# Patient Record
Sex: MALE | Race: WHITE | NOT HISPANIC OR LATINO | URBAN - METROPOLITAN AREA
[De-identification: names, ages, dates, MRNs, and addresses within clinical notes are randomized per-mention and may not be internally consistent; named-entity substitution may affect disease eponyms.]

---

## 2024-04-25 ENCOUNTER — EMERGENCY (EMERGENCY)
Facility: HOSPITAL | Age: 74
LOS: 1 days | Discharge: HOME | End: 2024-04-25
Attending: EMERGENCY MEDICINE | Admitting: EMERGENCY MEDICINE
Payer: MEDICARE

## 2024-04-25 VITALS
HEART RATE: 73 BPM | DIASTOLIC BLOOD PRESSURE: 92 MMHG | OXYGEN SATURATION: 100 % | RESPIRATION RATE: 18 BRPM | TEMPERATURE: 98 F | SYSTOLIC BLOOD PRESSURE: 163 MMHG

## 2024-04-25 VITALS
SYSTOLIC BLOOD PRESSURE: 138 MMHG | TEMPERATURE: 98 F | RESPIRATION RATE: 16 BRPM | HEART RATE: 71 BPM | DIASTOLIC BLOOD PRESSURE: 74 MMHG | OXYGEN SATURATION: 100 %

## 2024-04-25 LAB
ALBUMIN SERPL ELPH-MCNC: 4.3 G/DL — SIGNIFICANT CHANGE UP (ref 3.3–5)
ALP SERPL-CCNC: 33 U/L — LOW (ref 40–120)
ALT FLD-CCNC: 10 U/L — SIGNIFICANT CHANGE UP (ref 4–41)
ANION GAP SERPL CALC-SCNC: 13 MMOL/L — SIGNIFICANT CHANGE UP (ref 7–14)
APPEARANCE UR: ABNORMAL
AST SERPL-CCNC: 13 U/L — SIGNIFICANT CHANGE UP (ref 4–40)
BASOPHILS # BLD AUTO: 0.05 K/UL — SIGNIFICANT CHANGE UP (ref 0–0.2)
BASOPHILS NFR BLD AUTO: 0.6 % — SIGNIFICANT CHANGE UP (ref 0–2)
BILIRUB SERPL-MCNC: 0.4 MG/DL — SIGNIFICANT CHANGE UP (ref 0.2–1.2)
BILIRUB UR-MCNC: NEGATIVE — SIGNIFICANT CHANGE UP
BLOOD GAS VENOUS COMPREHENSIVE RESULT: SIGNIFICANT CHANGE UP
BLOOD GAS VENOUS COMPREHENSIVE RESULT: SIGNIFICANT CHANGE UP
BUN SERPL-MCNC: 37 MG/DL — HIGH (ref 7–23)
CALCIUM SERPL-MCNC: 10.1 MG/DL — SIGNIFICANT CHANGE UP (ref 8.4–10.5)
CHLORIDE SERPL-SCNC: 103 MMOL/L — SIGNIFICANT CHANGE UP (ref 98–107)
CO2 SERPL-SCNC: 20 MMOL/L — LOW (ref 22–31)
COLOR SPEC: YELLOW — SIGNIFICANT CHANGE UP
CREAT SERPL-MCNC: 1.29 MG/DL — SIGNIFICANT CHANGE UP (ref 0.5–1.3)
DIFF PNL FLD: ABNORMAL
EGFR: 59 ML/MIN/1.73M2 — LOW
EOSINOPHIL # BLD AUTO: 0.11 K/UL — SIGNIFICANT CHANGE UP (ref 0–0.5)
EOSINOPHIL NFR BLD AUTO: 1.3 % — SIGNIFICANT CHANGE UP (ref 0–6)
GLUCOSE SERPL-MCNC: 162 MG/DL — HIGH (ref 70–99)
GLUCOSE UR QL: NEGATIVE MG/DL — SIGNIFICANT CHANGE UP
HCT VFR BLD CALC: 40.4 % — SIGNIFICANT CHANGE UP (ref 39–50)
HGB BLD-MCNC: 14.1 G/DL — SIGNIFICANT CHANGE UP (ref 13–17)
IANC: 5.84 K/UL — SIGNIFICANT CHANGE UP (ref 1.8–7.4)
IMM GRANULOCYTES NFR BLD AUTO: 0.5 % — SIGNIFICANT CHANGE UP (ref 0–0.9)
KETONES UR-MCNC: NEGATIVE MG/DL — SIGNIFICANT CHANGE UP
LEUKOCYTE ESTERASE UR-ACNC: ABNORMAL
LYMPHOCYTES # BLD AUTO: 1.77 K/UL — SIGNIFICANT CHANGE UP (ref 1–3.3)
LYMPHOCYTES # BLD AUTO: 20.8 % — SIGNIFICANT CHANGE UP (ref 13–44)
MCHC RBC-ENTMCNC: 31.4 PG — SIGNIFICANT CHANGE UP (ref 27–34)
MCHC RBC-ENTMCNC: 34.9 GM/DL — SIGNIFICANT CHANGE UP (ref 32–36)
MCV RBC AUTO: 90 FL — SIGNIFICANT CHANGE UP (ref 80–100)
MONOCYTES # BLD AUTO: 0.68 K/UL — SIGNIFICANT CHANGE UP (ref 0–0.9)
MONOCYTES NFR BLD AUTO: 8 % — SIGNIFICANT CHANGE UP (ref 2–14)
NEUTROPHILS # BLD AUTO: 5.84 K/UL — SIGNIFICANT CHANGE UP (ref 1.8–7.4)
NEUTROPHILS NFR BLD AUTO: 68.8 % — SIGNIFICANT CHANGE UP (ref 43–77)
NITRITE UR-MCNC: POSITIVE
NRBC # BLD: 0 /100 WBCS — SIGNIFICANT CHANGE UP (ref 0–0)
NRBC # FLD: 0 K/UL — SIGNIFICANT CHANGE UP (ref 0–0)
PH UR: 8.5 (ref 5–8)
PLATELET # BLD AUTO: 230 K/UL — SIGNIFICANT CHANGE UP (ref 150–400)
POTASSIUM SERPL-MCNC: 4 MMOL/L — SIGNIFICANT CHANGE UP (ref 3.5–5.3)
POTASSIUM SERPL-SCNC: 4 MMOL/L — SIGNIFICANT CHANGE UP (ref 3.5–5.3)
PROT SERPL-MCNC: 7 G/DL — SIGNIFICANT CHANGE UP (ref 6–8.3)
PROT UR-MCNC: 100 MG/DL
RBC # BLD: 4.49 M/UL — SIGNIFICANT CHANGE UP (ref 4.2–5.8)
RBC # FLD: 13.4 % — SIGNIFICANT CHANGE UP (ref 10.3–14.5)
SODIUM SERPL-SCNC: 136 MMOL/L — SIGNIFICANT CHANGE UP (ref 135–145)
SP GR SPEC: 1.02 — SIGNIFICANT CHANGE UP (ref 1–1.03)
UROBILINOGEN FLD QL: 0.2 MG/DL — SIGNIFICANT CHANGE UP (ref 0.2–1)
WBC # BLD: 8.49 K/UL — SIGNIFICANT CHANGE UP (ref 3.8–10.5)
WBC # FLD AUTO: 8.49 K/UL — SIGNIFICANT CHANGE UP (ref 3.8–10.5)

## 2024-04-25 PROCEDURE — 74176 CT ABD & PELVIS W/O CONTRAST: CPT | Mod: 26,MC

## 2024-04-25 PROCEDURE — 99285 EMERGENCY DEPT VISIT HI MDM: CPT | Mod: FS

## 2024-04-25 RX ORDER — CEFDINIR 250 MG/5ML
1 POWDER, FOR SUSPENSION ORAL
Qty: 14 | Refills: 0
Start: 2024-04-25 | End: 2024-05-01

## 2024-04-25 RX ORDER — SODIUM CHLORIDE 9 MG/ML
500 INJECTION INTRAMUSCULAR; INTRAVENOUS; SUBCUTANEOUS ONCE
Refills: 0 | Status: COMPLETED | OUTPATIENT
Start: 2024-04-25 | End: 2024-04-25

## 2024-04-25 RX ORDER — CEFDINIR 250 MG/5ML
1 POWDER, FOR SUSPENSION ORAL
Qty: 20 | Refills: 0
Start: 2024-04-25 | End: 2024-05-04

## 2024-04-25 RX ORDER — PIPERACILLIN AND TAZOBACTAM 4; .5 G/20ML; G/20ML
3.38 INJECTION, POWDER, LYOPHILIZED, FOR SOLUTION INTRAVENOUS ONCE
Refills: 0 | Status: COMPLETED | OUTPATIENT
Start: 2024-04-25 | End: 2024-04-25

## 2024-04-25 RX ADMIN — PIPERACILLIN AND TAZOBACTAM 200 GRAM(S): 4; .5 INJECTION, POWDER, LYOPHILIZED, FOR SOLUTION INTRAVENOUS at 19:54

## 2024-04-25 RX ADMIN — SODIUM CHLORIDE 500 MILLILITER(S): 9 INJECTION INTRAMUSCULAR; INTRAVENOUS; SUBCUTANEOUS at 13:36

## 2024-04-25 RX ADMIN — SODIUM CHLORIDE 500 MILLILITER(S): 9 INJECTION INTRAMUSCULAR; INTRAVENOUS; SUBCUTANEOUS at 15:06

## 2024-04-25 NOTE — ED PROVIDER NOTE - NS ED ATTENDING STATEMENT MOD
I have seen and examined this patient and fully participated in the care of this patient as the teaching attending.  The service was shared with the MARLENA.  I reviewed and verified the documentation.

## 2024-04-25 NOTE — ED PROVIDER NOTE - PATIENT PORTAL LINK FT
You can access the FollowMyHealth Patient Portal offered by [facility] by registering at the following website: [email or web address]. By joining [employer]’s FollowMyHealth portal, you will also be able to view your health information using other applications (apps) compatible with our system.

## 2024-04-25 NOTE — ED ADULT NURSE NOTE - NSFALLUNIVINTERV_ED_ALL_ED
Bed/Stretcher in lowest position, wheels locked, appropriate side rails in place/Call bell, personal items and telephone in reach/Instruct patient to call for assistance before getting out of bed/chair/stretcher/Non-slip footwear applied when patient is off stretcher/[place] to call system/Physically safe environment - no spills, clutter or unnecessary equipment/Purposeful proactive rounding/Room/bathroom lighting operational, light cord in reach

## 2024-04-25 NOTE — ED ADULT NURSE NOTE - OBJECTIVE STATEMENT
Break RN: Pt received to room 20. Pt A&O x 4, ambulatory. Pt c/o UTI, dysuria and stones x 2days. + CVA tenderness. Pt has a chronic hernandez. 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 Hx of prostatitis. Pt denies fevers, chills, headache, vision changes, dizziness, chest pain, SOB, numbness or tingling, abdominal pain, N/V/D. PERRLA observed. No neuro deficits. Respirations even and unlabored. Abdomen soft, tender in left quadrant, distended. +2 pulses in all extremities. Comfort measures provided. Safety maintained. Pending MD orders. Break RN: Pt received to room 20. Pt A&O x 4, ambulatory. Pt c/o UTI, dysuria and stones x 2days. + CVA tenderness. Pt has a chronic hernandez. 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 Hx of prostatitis, DM2(noninsulin dependent). Pt denies fevers, chills, headache, vision changes, dizziness, chest pain, SOB, numbness or tingling, abdominal pain, N/V/D. PERRLA observed. No neuro deficits. Respirations even and unlabored. Abdomen soft, tender in left quadrant, distended. +2 pulses in all extremities. Comfort measures provided. Safety maintained. Pending MD orders.

## 2024-04-25 NOTE — ED PROVIDER NOTE - CLINICAL SUMMARY MEDICAL DECISION MAKING FREE TEXT BOX
73-year-old male with past medical history of spinal stenosis complicated by neurogenic bladder and chronic Salazar, diabetes type 2 on insulin, here complaining of stones in his Salazar catheter.  Patient states that he has been "battling chronic urine infections for the last 3 years".  Lives in the [place] during the winter months and has a urologist and "infectologist" that manage him there.  Patient is concerned that he is passing bladder stones which could be a result of Proteus in the urine.  Was just treated for a UTI with amikacin and fosfomycin 3 weeks ago in the [place].  Patient also states he thinks he has prostatitis, which she has self diagnosed.  States his urologist in the ER is not managing him anymore because he took cephalexin without the doctors approval.  Patient states he does not believe in colonization of the bladder and wants a cure for his infections.  Denies fevers or chills.  Tolerating p.o.  Physical exam unremarkable.  No abdominal tenderness.  Plan to obtain CT abdomen to assess for bladder stones, basic labs, treat UA accordingly.  Salazar catheter was exchanged 2 days ago, is draining, no need for replacement at this time.

## 2024-04-25 NOTE — ED PROVIDER NOTE - PHYSICAL EXAMINATION
Attending/Sally: Well-appearing, NAD; PERRL/EOMI, non-icterus, supple, no HENRIETTA, no JVD, RRR, CTAB; Abd-soft, NT/ND, no HSM; no LE edema, A&Ox3, nonfocal; Skin-warm/dry  Salazar catheter bag-light yellow, ~150 mL

## 2024-04-25 NOTE — ED ADULT TRIAGE NOTE - NSSEPSISSUSPECTED_ED_A_ED
Last ov  4/14//2020  No future ov scheduled    Last refill  4/13/20  #60, takes one tablet po 2 times daily.    pdmp checked:  NO alerts or Warnings  However, it states last dispensed was  3/17/20  
No

## 2024-04-25 NOTE — ED PROVIDER NOTE - PROGRESS NOTE DETAILS
Alonso Rosas MD, PGY2  Lab work nonactionable.  Urinalysis showing grossly positive, likely UTI.  CT showing mild right hydro and perinephric fat stranding concerning for possible pyelonephritis.  Patient given dose of Zosyn in the emergency department.  Will send cefdinir to pharmacy for patient.  Patient will follow-up with his urologist.  Will DC home.  Gave strict return precautions.  Answered all questions. Alonso Rosas MD, PGY2  Lab work nonactionable.  Urinalysis showing grossly positive, likely UTI.  CT showing mild right hydro and perinephric fat stranding concerning for possible pyelonephritis.  Patient given dose of Zosyn in the emergency department.  Will send cefdinir to pharmacy for patient.  Patient will follow-up with urologist.  Will DC home.  Gave strict return precautions.  Answered all questions.

## 2024-04-25 NOTE — ED PROVIDER NOTE - OBJECTIVE STATEMENT
73-year-old male with past medical history of spinal stenosis complicated by neurogenic bladder and chronic Salazar, diabetes type 2 on insulin, here complaining of stones in his Salazar catheter.  Patient states that he has been "battling chronic urine infections for the last 3 years".  [place] [facility] [place] the [place] and has a urologist and "infectologist" that manage him there.  Patient is concerned that he is passing bladder stones which could be a result of Proteus in the urine.  Was just treated for a UTI with amikacin and fosfomycin 3 weeks ago in the [facility] [place].  Patient also states he thinks he has prostatitis, which she has self diagnosed.  States his urologist in the ER is not managing him anymore because he took cephalexin without the doctors approval.  Patient states he does not believe in colonization of the bladder and wants a cure for his infections.  Denies fevers or chills.  Tolerating p.o. 73-year-old male with past medical history of spinal stenosis complicated by neurogenic bladder and chronic Salazar, diabetes type 2 on insulin, here complaining of stones in his Salazar catheter.  Patient states that he has been "battling chronic urine infections for the last 3 years".  L[place] in the [place] during the winter months and has a urologist and "infectologist" that manage him there.  Patient is concerned that he is passing bladder stones which could be a result of Proteus in the urine.  Was just treated for a UTI with amikacin and fosfomycin 3 weeks ago in the [facility] [place].  Patient also states he thinks he has prostatitis, which she has self diagnosed.  States his urologist in the ER is not managing him anymore because he took cephalexin without the doctors approval.  Patient states he does not believe in colonization of the bladder and wants a cure for his infections.  Denies fevers or chills.  Tolerating p.o.    Attending/Sally: 74 yo M as described above. Patient denies acute symptoms. He reprots folwy catheter replaced two days ago.

## 2024-04-25 NOTE — ED ADULT TRIAGE NOTE - CHIEF COMPLAINT QUOTE
Patient c/o UTI x 2 days. Patient has chronic hernandez cath and noted stones in catheter and pain to hernandez site. PHX- Prostatitis, born with only right kidney, DM2 no insulin pump. . Breathing non-labored. Patient ambulatory.

## 2024-04-25 NOTE — ED ADULT NURSE REASSESSMENT NOTE - NS ED NURSE REASSESS COMMENT FT1
Received pt alert and oriented X4. All vitals assessed. Pt verbalizes all his needs. Pt seen by both PA & MD. Pt in room resting comfortably. Will continue to round on pt throughout shift. Safety maintained

## 2024-04-25 NOTE — ED PROVIDER NOTE - CARE PROVIDER_API CALL
Ritchie Acuña  Urology  [address], [address]  [place] [address]  Phone: [phone]  Fax: [phone]  Follow Up Time:

## 2024-04-25 NOTE — PROVIDER CONTACT NOTE (OTHER) - ASSESSMENT
SW spoke with the patient at bedside who stated someone will be picking him up and he no longer needs a ride. Medical team alerted of update.   No further SW intervention needed for this visit.

## 2024-04-27 ENCOUNTER — EMERGENCY (EMERGENCY)
Facility: HOSPITAL | Age: 74
LOS: 1 days | Discharge: HOME | End: 2024-04-27
Attending: EMERGENCY MEDICINE | Admitting: EMERGENCY MEDICINE
Payer: MEDICARE

## 2024-04-27 VITALS
OXYGEN SATURATION: 97 % | SYSTOLIC BLOOD PRESSURE: 153 MMHG | DIASTOLIC BLOOD PRESSURE: 93 MMHG | TEMPERATURE: 98 F | HEART RATE: 77 BPM | RESPIRATION RATE: 18 BRPM

## 2024-04-27 VITALS
TEMPERATURE: 98 F | RESPIRATION RATE: 18 BRPM | HEART RATE: 74 BPM | OXYGEN SATURATION: 99 % | DIASTOLIC BLOOD PRESSURE: 75 MMHG | SYSTOLIC BLOOD PRESSURE: 141 MMHG

## 2024-04-27 PROBLEM — Z97.8 PRESENCE OF OTHER SPECIFIED DEVICES: Chronic | Status: ACTIVE | Noted: 2024-04-25

## 2024-04-27 PROBLEM — N31.9 NEUROMUSCULAR DYSFUNCTION OF BLADDER, UNSPECIFIED: Chronic | Status: ACTIVE | Noted: 2024-04-25

## 2024-04-27 PROBLEM — E11.9 TYPE 2 DIABETES MELLITUS WITHOUT COMPLICATIONS: Chronic | Status: ACTIVE | Noted: 2024-04-25

## 2024-04-27 LAB
-  COAGULASE NEGATIVE STAPHYLOCOCCUS: SIGNIFICANT CHANGE UP
ALBUMIN SERPL ELPH-MCNC: 4.1 G/DL — SIGNIFICANT CHANGE UP (ref 3.3–5)
ALP SERPL-CCNC: 38 U/L — LOW (ref 40–120)
ALT FLD-CCNC: 10 U/L — SIGNIFICANT CHANGE UP (ref 4–41)
ANION GAP SERPL CALC-SCNC: 11 MMOL/L — SIGNIFICANT CHANGE UP (ref 7–14)
APPEARANCE UR: ABNORMAL
AST SERPL-CCNC: 12 U/L — SIGNIFICANT CHANGE UP (ref 4–40)
BACTERIA # UR AUTO: ABNORMAL /HPF
BASOPHILS # BLD AUTO: 0.04 K/UL — SIGNIFICANT CHANGE UP (ref 0–0.2)
BASOPHILS NFR BLD AUTO: 0.7 % — SIGNIFICANT CHANGE UP (ref 0–2)
BILIRUB SERPL-MCNC: 0.3 MG/DL — SIGNIFICANT CHANGE UP (ref 0.2–1.2)
BILIRUB UR-MCNC: NEGATIVE — SIGNIFICANT CHANGE UP
BUN SERPL-MCNC: 31 MG/DL — HIGH (ref 7–23)
CALCIUM SERPL-MCNC: 9.4 MG/DL — SIGNIFICANT CHANGE UP (ref 8.4–10.5)
CAST: 4 /LPF — SIGNIFICANT CHANGE UP (ref 0–4)
CHLORIDE SERPL-SCNC: 106 MMOL/L — SIGNIFICANT CHANGE UP (ref 98–107)
CO2 SERPL-SCNC: 22 MMOL/L — SIGNIFICANT CHANGE UP (ref 22–31)
COLOR SPEC: YELLOW — SIGNIFICANT CHANGE UP
CREAT SERPL-MCNC: 1.11 MG/DL — SIGNIFICANT CHANGE UP (ref 0.5–1.3)
CULTURE RESULTS: ABNORMAL
CULTURE RESULTS: SIGNIFICANT CHANGE UP
DIFF PNL FLD: NEGATIVE — SIGNIFICANT CHANGE UP
EGFR: 71 ML/MIN/1.73M2 — SIGNIFICANT CHANGE UP
EOSINOPHIL # BLD AUTO: 0.11 K/UL — SIGNIFICANT CHANGE UP (ref 0–0.5)
EOSINOPHIL NFR BLD AUTO: 1.8 % — SIGNIFICANT CHANGE UP (ref 0–6)
GLUCOSE SERPL-MCNC: 160 MG/DL — HIGH (ref 70–99)
GLUCOSE UR QL: 250 MG/DL
GRAM STN FLD: ABNORMAL
HCT VFR BLD CALC: 40.5 % — SIGNIFICANT CHANGE UP (ref 39–50)
HGB BLD-MCNC: 13.9 G/DL — SIGNIFICANT CHANGE UP (ref 13–17)
IANC: 3.65 K/UL — SIGNIFICANT CHANGE UP (ref 1.8–7.4)
IMM GRANULOCYTES NFR BLD AUTO: 0.7 % — SIGNIFICANT CHANGE UP (ref 0–0.9)
KETONES UR-MCNC: NEGATIVE MG/DL — SIGNIFICANT CHANGE UP
LEUKOCYTE ESTERASE UR-ACNC: ABNORMAL
LYMPHOCYTES # BLD AUTO: 1.62 K/UL — SIGNIFICANT CHANGE UP (ref 1–3.3)
LYMPHOCYTES # BLD AUTO: 26.6 % — SIGNIFICANT CHANGE UP (ref 13–44)
MCHC RBC-ENTMCNC: 30.8 PG — SIGNIFICANT CHANGE UP (ref 27–34)
MCHC RBC-ENTMCNC: 34.3 GM/DL — SIGNIFICANT CHANGE UP (ref 32–36)
MCV RBC AUTO: 89.8 FL — SIGNIFICANT CHANGE UP (ref 80–100)
METHOD TYPE: SIGNIFICANT CHANGE UP
MONOCYTES # BLD AUTO: 0.62 K/UL — SIGNIFICANT CHANGE UP (ref 0–0.9)
MONOCYTES NFR BLD AUTO: 10.2 % — SIGNIFICANT CHANGE UP (ref 2–14)
NEUTROPHILS # BLD AUTO: 3.65 K/UL — SIGNIFICANT CHANGE UP (ref 1.8–7.4)
NEUTROPHILS NFR BLD AUTO: 60 % — SIGNIFICANT CHANGE UP (ref 43–77)
NITRITE UR-MCNC: POSITIVE
NRBC # BLD: 0 /100 WBCS — SIGNIFICANT CHANGE UP (ref 0–0)
NRBC # FLD: 0 K/UL — SIGNIFICANT CHANGE UP (ref 0–0)
ORGANISM # SPEC MICROSCOPIC CNT: ABNORMAL
ORGANISM # SPEC MICROSCOPIC CNT: ABNORMAL
PH UR: 8.5 (ref 5–8)
PLATELET # BLD AUTO: 201 K/UL — SIGNIFICANT CHANGE UP (ref 150–400)
POTASSIUM SERPL-MCNC: 4.2 MMOL/L — SIGNIFICANT CHANGE UP (ref 3.5–5.3)
POTASSIUM SERPL-SCNC: 4.2 MMOL/L — SIGNIFICANT CHANGE UP (ref 3.5–5.3)
PROT SERPL-MCNC: 6.9 G/DL — SIGNIFICANT CHANGE UP (ref 6–8.3)
PROT UR-MCNC: 30 MG/DL
RBC # BLD: 4.51 M/UL — SIGNIFICANT CHANGE UP (ref 4.2–5.8)
RBC # FLD: 13.2 % — SIGNIFICANT CHANGE UP (ref 10.3–14.5)
RBC CASTS # UR COMP ASSIST: 2 /HPF — SIGNIFICANT CHANGE UP (ref 0–4)
REVIEW: SIGNIFICANT CHANGE UP
SODIUM SERPL-SCNC: 139 MMOL/L — SIGNIFICANT CHANGE UP (ref 135–145)
SP GR SPEC: 1.02 — SIGNIFICANT CHANGE UP (ref 1–1.03)
SPECIMEN SOURCE: SIGNIFICANT CHANGE UP
SQUAMOUS # UR AUTO: 0 /HPF — SIGNIFICANT CHANGE UP (ref 0–5)
TRI-PHOS CRY UR QL COMP ASSIST: PRESENT
UROBILINOGEN FLD QL: 0.2 MG/DL — SIGNIFICANT CHANGE UP (ref 0.2–1)
WBC # BLD: 6.08 K/UL — SIGNIFICANT CHANGE UP (ref 3.8–10.5)
WBC # FLD AUTO: 6.08 K/UL — SIGNIFICANT CHANGE UP (ref 3.8–10.5)
WBC UR QL: 17 /HPF — HIGH (ref 0–5)

## 2024-04-27 PROCEDURE — 99284 EMERGENCY DEPT VISIT MOD MDM: CPT | Mod: GC

## 2024-04-27 RX ORDER — INSULIN ASPART 100 [IU]/ML
16 INJECTION, SOLUTION SUBCUTANEOUS
Qty: 1 | Refills: 0
Start: 2024-04-27 | End: 2024-05-26

## 2024-04-27 RX ORDER — SODIUM CHLORIDE 9 MG/ML
1000 INJECTION INTRAMUSCULAR; INTRAVENOUS; SUBCUTANEOUS ONCE
Refills: 0 | Status: COMPLETED | OUTPATIENT
Start: 2024-04-27 | End: 2024-04-27

## 2024-04-27 RX ORDER — INSULIN GLARGINE 100 [IU]/ML
40 INJECTION, SOLUTION SUBCUTANEOUS
Qty: 1 | Refills: 0
Start: 2024-04-27 | End: 2024-05-26

## 2024-04-27 RX ADMIN — SODIUM CHLORIDE 1000 MILLILITER(S): 9 INJECTION INTRAMUSCULAR; INTRAVENOUS; SUBCUTANEOUS at 13:46

## 2024-04-27 NOTE — ED ADULT NURSE NOTE - OBJECTIVE STATEMENT
pt reports to ED  awake and alert x 3 , ambulatory with steady gait to ER room 7. pt states " received phone call by primary md to follow up in er for abnormal lab work of positive blood cultures " pt denies any active pain or c/o . pt afebrile , denies any fever or chills or s/s of infection , pink , warm and dry to touch. pt orientated to room with understanding verbalized. No active c/o . pt also reports chronic Salazar intact with leg bag , urine clear ko, denies any pain or burning with urination . POC in progress, safety maintained at stretcher side with continued monitoring by RN .

## 2024-04-27 NOTE — ED PROVIDER NOTE - PROGRESS NOTE DETAILS
Namrata BUCK:Labs discussed with patient.  I discussed with him how he likely has a skin contaminant and his blood culture.  Patient states he feels well.  I discussed changing the Salazar with patient to get a clean culture.  Patient is on cefdinir at this time.  Based on his urine culture results that he has with him from an outside hospital, he previously had an infection with Proteus Mirabella's, resistant to cephalosporins.   Sensitivities show that  patient will likely require IV or ertapenem if he is still infected.  He states that amikacin and fosfomycin were given to him and it did not work.  Patient states that he prefers did not have his Salazar catheter changed.  He states that he has stones in his bladder and it was very traumatic to change it.  I printed a copy of the recent urine culture results which shows 3 contaminants.  Patient states he prefers to follow-up with his urologist, Dr. Acuña.  He states that he thinks he is likely colonized.  I discussed with him concern for worsening symptoms including fevers, chills, pain, dizziness.  Patient states he prefers to be discharged and follow-up.  He states that he has airline tickets and does not want to lose "hundreds of dollars." as patient has remained stable, without any fevers, chills and has no significant change in his blood work, plan for discharge.  Repeat blood cultures were drawn and sent.  Patient declined changing his Salazar and sending a fresh urine sample. MD Brianna (PGY-2) Patient requesting insulin refill. Patient providing home dosages. Will send prescription.

## 2024-04-27 NOTE — ED ADULT NURSE NOTE - NSFALLUNIVINTERV_ED_ALL_ED
Bed/Stretcher in lowest position, wheels locked, appropriate side rails in place/Call bell, personal items and telephone in reach/Instruct patient to call for assistance before getting out of bed/chair/stretcher/Non-slip footwear applied when patient is off stretcher/[place] to call system/Physically safe environment - no spills, clutter or unnecessary equipment/Purposeful proactive rounding/Room/bathroom lighting operational, light cord in reach

## 2024-04-27 NOTE — ED ADULT TRIAGE NOTE - CHIEF COMPLAINT QUOTE
c/o generalized weakness, repots was a call back for positive blood cultures, Phx of DM type 2, chronic Salazar with frequent UTIs

## 2024-04-27 NOTE — ED POST DISCHARGE NOTE - RESULT SUMMARY
with positive blood cx. patient contact but no answer. Voicemail was not available to leave message. information will be passed on the call back line to attempt to contact patient.

## 2024-04-27 NOTE — ED PROVIDER NOTE - CLINICAL SUMMARY MEDICAL DECISION MAKING FREE TEXT BOX
73-year-old male with past medical history of diabetes, spinal stenosis complicated by neurogenic bladder with chronic Salazar, presenting the ED after being called back for positive blood culture.  Hemodynamically stable.  Physical exam with heart regular rate and rhythm, lungs clear to auscultation, abdomen soft nondistended nontender.  Patient without any CVA tenderness.  Patient called back for positive culture with coag negative staph.  Given patient's vitals and history, likely contamination.  Will get repeat labs including CBC, CMP, blood cultures, UA/UC.  Will replace Salazar.

## 2024-04-27 NOTE — ED PROVIDER NOTE - NSFOLLOWUPINSTRUCTIONS_ED_ALL_ED_FT
You were seen in the Emergency Department for concern for blood cultures. Your vitals were stable when you came to the emergency department. You did show a history of having antibiotic resistant UTI, which means that the antibiotics you were sent home on may not cover your UTI. You were recommended to stay in the hospital for IV antibiotics, but you would prefer to follow up outpatient with your primary care doctor. Please call him as soon as possible for evaluation.    1) Advance activity as tolerated.   2) Continue all previously prescribed medications as directed.    3) Follow up with your primary care physician in 24-48 hours - take copies of your results.    4) Return to the Emergency Department for worsening or persistent symptoms, and/or ANY NEW OR CONCERNING SYMPTOMS. You were seen in the Emergency Department for concern for blood cultures. Your vitals were stable when you came to the emergency department. You did show a history of having antibiotic resistant UTI, which means that the antibiotics you were sent home on may not cover your UTI. You were recommended to stay in the hospital for IV antibiotics, but you would prefer to follow up outpatient with your primary care doctor. Please call him as soon as possible for evaluation.    1) Advance activity as tolerated.   2) Continue all previously prescribed medications as directed.    3) Follow up with your primary care physician in 24-48 hours - take copies of your results.    4) Return to the Emergency Department for worsening or persistent symptoms, fever, chills, nausea, vomiting, flank pain, and/or ANY NEW OR CONCERNING SYMPTOMS.

## 2024-04-27 NOTE — ED PROVIDER NOTE - OBJECTIVE STATEMENT
73-year-old male with past medical history of diabetes, spinal stenosis complicated by neurogenic bladder with chronic Salazar, presenting the ED after being called back for positive blood culture.  Patient was in the ED on April 25 for concerns of stones in his Salazar.  Patient's UA was grossly positive for UTI.  Urine culture came back with greater than 3 organisms probable collection contamination.  Patient blood culture with coagulase negative staph.  Patient states that he has not had any fevers, chills, nausea, vomiting, flank pain since coming leaving the hospital.  Patient was discharged on cefdinir.  Patient missed a.m. antibiotics due to not being able to get to the pharmacy.  Patient last took cefdinir last night.  Patient reports some abdominal pain with associated constipation.  Patient states he is passing gas.  Patient states that since having the spinal stenosis, he has been having consistent constipation.

## 2024-04-27 NOTE — ED PROVIDER NOTE - PATIENT PORTAL LINK FT
You can access the FollowMyHealth Patient Portal offered by [facility] by registering at the following website: [email or web address]. By joining [employer]’s FollowMyHealth portal, you will also be able to view your health information using other applications (apps) compatible with our system.

## 2024-04-27 NOTE — ED PROVIDER NOTE - ATTENDING CONTRIBUTION TO CARE
Patient is a 73-year-old male with a past medical history significant for type 2 diabetes mellitus, on insulin, chronic Salazar catheter secondary to neurogenic bladder (secondary to spinal stenosis), chronic intermittent urine infections who was seen in the emergency department 4/25 for concern of stones in his Salazar.  Patient had blood cultures and urine culture sent.  Urine culture resulted with greater than 3 organisms, likely contamination.  Patient had a positive blood culture in anaerobic bottle with coag negative staph (cocci).  Patient was discharged on cefdinir.  He states he did not  his prescription until yesterday and took a dose last night.  He did not take a dose yesterday morning.  He denies any fevers or chills.  He states he did not sleep well secondary to gas issues.  Patient states he did not take any medication this morning as he was called back to the emergency department.  He reports his Salazar catheter was changed about 3 weeks ago and it was traumatic at the time.  He has a picture with him that shows blood in the Salazar catheter.  During my assessment, his Salazar did not appear to have blood in it.  Patient states that he has noticed blood intermittently throughout the week but otherwise it has been flowing.        VS noted  Gen. no acute distress, Non toxic   HEENT: EOMI, mmm  Lungs: CTAB/L no C/ W /R   CVS: RRR   Abd; Soft non tender, non distended   Ext: no edema  Skin: no rash  Neuro AAOx3 non focal clear speech  a/p:  positive blood culture–patient has an anaerobic bottle with coag negative staph.  He denies any fevers, chills, nausea, vomiting. Patient is on antibiotics.  Will advise to continue antibiotics.  At this time, patient appears to be well.  Will check labs to check for any changes and redraw cultures.  If no significant change in blood work, will discuss discharge with patient with strict return precautions.  Patient's positive cultures likely a skin contaminant.  - Namrata BUCK

## 2024-05-01 LAB
-  AMPICILLIN: SIGNIFICANT CHANGE UP
-  CIPROFLOXACIN: SIGNIFICANT CHANGE UP
-  LEVOFLOXACIN: SIGNIFICANT CHANGE UP
-  NITROFURANTOIN: SIGNIFICANT CHANGE UP
-  TETRACYCLINE: SIGNIFICANT CHANGE UP
-  VANCOMYCIN: SIGNIFICANT CHANGE UP
CULTURE RESULTS: SIGNIFICANT CHANGE UP
METHOD TYPE: SIGNIFICANT CHANGE UP
SPECIMEN SOURCE: SIGNIFICANT CHANGE UP

## 2024-05-02 LAB
-  AMOXICILLIN/CLAVULANIC ACID: SIGNIFICANT CHANGE UP
-  AMPICILLIN/SULBACTAM: SIGNIFICANT CHANGE UP
-  AMPICILLIN: SIGNIFICANT CHANGE UP
-  AZTREONAM: SIGNIFICANT CHANGE UP
-  CEFAZOLIN: SIGNIFICANT CHANGE UP
-  CEFEPIME: SIGNIFICANT CHANGE UP
-  CEFOXITIN: SIGNIFICANT CHANGE UP
-  CEFTRIAXONE: SIGNIFICANT CHANGE UP
-  CEFUROXIME: SIGNIFICANT CHANGE UP
-  CIPROFLOXACIN: SIGNIFICANT CHANGE UP
-  ERTAPENEM: SIGNIFICANT CHANGE UP
-  GENTAMICIN: SIGNIFICANT CHANGE UP
-  LEVOFLOXACIN: SIGNIFICANT CHANGE UP
-  MEROPENEM: SIGNIFICANT CHANGE UP
-  NITROFURANTOIN: SIGNIFICANT CHANGE UP
-  PIPERACILLIN/TAZOBACTAM: SIGNIFICANT CHANGE UP
-  TOBRAMYCIN: SIGNIFICANT CHANGE UP
-  TRIMETHOPRIM/SULFAMETHOXAZOLE: SIGNIFICANT CHANGE UP
CULTURE RESULTS: ABNORMAL
CULTURE RESULTS: SIGNIFICANT CHANGE UP
CULTURE RESULTS: SIGNIFICANT CHANGE UP
METHOD TYPE: SIGNIFICANT CHANGE UP
ORGANISM # SPEC MICROSCOPIC CNT: ABNORMAL
SPECIMEN SOURCE: SIGNIFICANT CHANGE UP

## 2024-05-03 NOTE — ED POST DISCHARGE NOTE - DETAILS
Patient with ESBL in urine culture. Patient was to follow up with urologist Dr. Acuña. Patient sent home with cefdinir, which ESBL is resistant. Patient without answering .

## 2024-05-19 ENCOUNTER — INPATIENT (INPATIENT)
Facility: HOSPITAL | Age: 74
LOS: 15 days | Discharge: HOME | End: 2024-06-04
Attending: NEUROLOGICAL SURGERY | Admitting: NEUROLOGICAL SURGERY
Payer: MEDICARE

## 2024-05-19 VITALS
SYSTOLIC BLOOD PRESSURE: 160 MMHG | HEART RATE: 85 BPM | OXYGEN SATURATION: 99 % | DIASTOLIC BLOOD PRESSURE: 80 MMHG | TEMPERATURE: 98 F | RESPIRATION RATE: 18 BRPM

## 2024-05-19 DIAGNOSIS — Z29.9 ENCOUNTER FOR PROPHYLACTIC MEASURES, UNSPECIFIED: ICD-10-CM

## 2024-05-19 DIAGNOSIS — R29.898 OTHER SYMPTOMS AND SIGNS INVOLVING THE MUSCULOSKELETAL SYSTEM: ICD-10-CM

## 2024-05-19 DIAGNOSIS — R53.1 WEAKNESS: ICD-10-CM

## 2024-05-19 DIAGNOSIS — E11.9 TYPE 2 DIABETES MELLITUS WITHOUT COMPLICATIONS: ICD-10-CM

## 2024-05-19 LAB
ALBUMIN SERPL ELPH-MCNC: 4 G/DL — SIGNIFICANT CHANGE UP (ref 3.3–5)
ALP SERPL-CCNC: 37 U/L — LOW (ref 40–120)
ALT FLD-CCNC: 11 U/L — SIGNIFICANT CHANGE UP (ref 4–41)
ANION GAP SERPL CALC-SCNC: 13 MMOL/L — SIGNIFICANT CHANGE UP (ref 7–14)
APPEARANCE UR: CLEAR — SIGNIFICANT CHANGE UP
APTT BLD: 28.6 SEC — SIGNIFICANT CHANGE UP (ref 24.5–35.6)
AST SERPL-CCNC: 12 U/L — SIGNIFICANT CHANGE UP (ref 4–40)
BACTERIA # UR AUTO: ABNORMAL /HPF
BASE EXCESS BLDV CALC-SCNC: 0.1 MMOL/L — SIGNIFICANT CHANGE UP (ref -2–3)
BASOPHILS # BLD AUTO: 0.05 K/UL — SIGNIFICANT CHANGE UP (ref 0–0.2)
BASOPHILS NFR BLD AUTO: 0.7 % — SIGNIFICANT CHANGE UP (ref 0–2)
BILIRUB SERPL-MCNC: 0.4 MG/DL — SIGNIFICANT CHANGE UP (ref 0.2–1.2)
BILIRUB UR-MCNC: NEGATIVE — SIGNIFICANT CHANGE UP
BLD GP AB SCN SERPL QL: NEGATIVE — SIGNIFICANT CHANGE UP
BLOOD GAS VENOUS COMPREHENSIVE RESULT: SIGNIFICANT CHANGE UP
BUN SERPL-MCNC: 30 MG/DL — HIGH (ref 7–23)
CALCIUM SERPL-MCNC: 9.7 MG/DL — SIGNIFICANT CHANGE UP (ref 8.4–10.5)
CAST: 1 /LPF — SIGNIFICANT CHANGE UP (ref 0–4)
CHLORIDE BLDV-SCNC: 104 MMOL/L — SIGNIFICANT CHANGE UP (ref 96–108)
CHLORIDE SERPL-SCNC: 103 MMOL/L — SIGNIFICANT CHANGE UP (ref 98–107)
CO2 BLDV-SCNC: 25.9 MMOL/L — SIGNIFICANT CHANGE UP (ref 22–26)
CO2 SERPL-SCNC: 21 MMOL/L — LOW (ref 22–31)
COLOR SPEC: YELLOW — SIGNIFICANT CHANGE UP
CREAT SERPL-MCNC: 1.26 MG/DL — SIGNIFICANT CHANGE UP (ref 0.5–1.3)
DIFF PNL FLD: ABNORMAL
EGFR: 61 ML/MIN/1.73M2 — SIGNIFICANT CHANGE UP
EOSINOPHIL # BLD AUTO: 0.13 K/UL — SIGNIFICANT CHANGE UP (ref 0–0.5)
EOSINOPHIL NFR BLD AUTO: 1.9 % — SIGNIFICANT CHANGE UP (ref 0–6)
GAS PNL BLDV: 134 MMOL/L — LOW (ref 136–145)
GLUCOSE BLDV-MCNC: 163 MG/DL — HIGH (ref 70–99)
GLUCOSE SERPL-MCNC: 204 MG/DL — HIGH (ref 70–99)
GLUCOSE UR QL: NEGATIVE MG/DL — SIGNIFICANT CHANGE UP
HCO3 BLDV-SCNC: 25 MMOL/L — SIGNIFICANT CHANGE UP (ref 22–29)
HCT VFR BLD CALC: 39.5 % — SIGNIFICANT CHANGE UP (ref 39–50)
HCT VFR BLDA CALC: 43 % — SIGNIFICANT CHANGE UP (ref 39–51)
HGB BLD CALC-MCNC: 14.4 G/DL — SIGNIFICANT CHANGE UP (ref 12.6–17.4)
HGB BLD-MCNC: 13.9 G/DL — SIGNIFICANT CHANGE UP (ref 13–17)
IANC: 4.59 K/UL — SIGNIFICANT CHANGE UP (ref 1.8–7.4)
IMM GRANULOCYTES NFR BLD AUTO: 0.4 % — SIGNIFICANT CHANGE UP (ref 0–0.9)
INR BLD: 0.95 RATIO — SIGNIFICANT CHANGE UP (ref 0.85–1.18)
KETONES UR-MCNC: NEGATIVE MG/DL — SIGNIFICANT CHANGE UP
LACTATE BLDV-MCNC: 1.6 MMOL/L — SIGNIFICANT CHANGE UP (ref 0.5–2)
LEUKOCYTE ESTERASE UR-ACNC: ABNORMAL
LYMPHOCYTES # BLD AUTO: 1.43 K/UL — SIGNIFICANT CHANGE UP (ref 1–3.3)
LYMPHOCYTES # BLD AUTO: 20.7 % — SIGNIFICANT CHANGE UP (ref 13–44)
MAGNESIUM SERPL-MCNC: 2 MG/DL — SIGNIFICANT CHANGE UP (ref 1.6–2.6)
MCHC RBC-ENTMCNC: 31 PG — SIGNIFICANT CHANGE UP (ref 27–34)
MCHC RBC-ENTMCNC: 35.2 GM/DL — SIGNIFICANT CHANGE UP (ref 32–36)
MCV RBC AUTO: 88.2 FL — SIGNIFICANT CHANGE UP (ref 80–100)
MONOCYTES # BLD AUTO: 0.67 K/UL — SIGNIFICANT CHANGE UP (ref 0–0.9)
MONOCYTES NFR BLD AUTO: 9.7 % — SIGNIFICANT CHANGE UP (ref 2–14)
NEUTROPHILS # BLD AUTO: 4.59 K/UL — SIGNIFICANT CHANGE UP (ref 1.8–7.4)
NEUTROPHILS NFR BLD AUTO: 66.6 % — SIGNIFICANT CHANGE UP (ref 43–77)
NITRITE UR-MCNC: POSITIVE
NRBC # BLD: 0 /100 WBCS — SIGNIFICANT CHANGE UP (ref 0–0)
NRBC # FLD: 0 K/UL — SIGNIFICANT CHANGE UP (ref 0–0)
PCO2 BLDV: 39 MMHG — LOW (ref 42–55)
PH BLDV: 7.41 — SIGNIFICANT CHANGE UP (ref 7.32–7.43)
PH UR: 7 — SIGNIFICANT CHANGE UP (ref 5–8)
PHOSPHATE SERPL-MCNC: 3 MG/DL — SIGNIFICANT CHANGE UP (ref 2.5–4.5)
PLATELET # BLD AUTO: 204 K/UL — SIGNIFICANT CHANGE UP (ref 150–400)
PO2 BLDV: 52 MMHG — HIGH (ref 25–45)
POTASSIUM BLDV-SCNC: 4.3 MMOL/L — SIGNIFICANT CHANGE UP (ref 3.5–5.1)
POTASSIUM SERPL-MCNC: 4.3 MMOL/L — SIGNIFICANT CHANGE UP (ref 3.5–5.3)
POTASSIUM SERPL-SCNC: 4.3 MMOL/L — SIGNIFICANT CHANGE UP (ref 3.5–5.3)
PROT SERPL-MCNC: 6.9 G/DL — SIGNIFICANT CHANGE UP (ref 6–8.3)
PROT UR-MCNC: SIGNIFICANT CHANGE UP MG/DL
PROTHROM AB SERPL-ACNC: 10.7 SEC — SIGNIFICANT CHANGE UP (ref 9.5–13)
RBC # BLD: 4.48 M/UL — SIGNIFICANT CHANGE UP (ref 4.2–5.8)
RBC # FLD: 13.3 % — SIGNIFICANT CHANGE UP (ref 10.3–14.5)
RBC CASTS # UR COMP ASSIST: 1 /HPF — SIGNIFICANT CHANGE UP (ref 0–4)
RH IG SCN BLD-IMP: POSITIVE — SIGNIFICANT CHANGE UP
SAO2 % BLDV: 83.5 % — SIGNIFICANT CHANGE UP (ref 67–88)
SODIUM SERPL-SCNC: 137 MMOL/L — SIGNIFICANT CHANGE UP (ref 135–145)
SP GR SPEC: 1.01 — SIGNIFICANT CHANGE UP (ref 1–1.03)
SQUAMOUS # UR AUTO: 1 /HPF — SIGNIFICANT CHANGE UP (ref 0–5)
UROBILINOGEN FLD QL: 0.2 MG/DL — SIGNIFICANT CHANGE UP (ref 0.2–1)
WBC # BLD: 6.9 K/UL — SIGNIFICANT CHANGE UP (ref 3.8–10.5)
WBC # FLD AUTO: 6.9 K/UL — SIGNIFICANT CHANGE UP (ref 3.8–10.5)
WBC UR QL: 51 /HPF — HIGH (ref 0–5)

## 2024-05-19 PROCEDURE — 99285 EMERGENCY DEPT VISIT HI MDM: CPT

## 2024-05-19 PROCEDURE — 72131 CT LUMBAR SPINE W/O DYE: CPT | Mod: 26,MC

## 2024-05-19 PROCEDURE — 72125 CT NECK SPINE W/O DYE: CPT | Mod: 26,MC

## 2024-05-19 PROCEDURE — 72128 CT CHEST SPINE W/O DYE: CPT | Mod: 26,MC

## 2024-05-19 RX ORDER — INSULIN GLARGINE 100 [IU]/ML
20 INJECTION, SOLUTION SUBCUTANEOUS AT BEDTIME
Refills: 0 | Status: DISCONTINUED | OUTPATIENT
Start: 2024-05-19 | End: 2024-05-20

## 2024-05-19 RX ORDER — LANOLIN ALCOHOL/MO/W.PET/CERES
3 CREAM (GRAM) TOPICAL AT BEDTIME
Refills: 0 | Status: DISCONTINUED | OUTPATIENT
Start: 2024-05-19 | End: 2024-06-04

## 2024-05-19 RX ORDER — DEXTROSE 50 % IN WATER 50 %
12.5 SYRINGE (ML) INTRAVENOUS ONCE
Refills: 0 | Status: DISCONTINUED | OUTPATIENT
Start: 2024-05-19 | End: 2024-06-04

## 2024-05-19 RX ORDER — PIPERACILLIN AND TAZOBACTAM 4; .5 G/20ML; G/20ML
3.38 INJECTION, POWDER, LYOPHILIZED, FOR SOLUTION INTRAVENOUS ONCE
Refills: 0 | Status: COMPLETED | OUTPATIENT
Start: 2024-05-19 | End: 2024-05-19

## 2024-05-19 RX ORDER — LORATADINE 10 MG/1
1 TABLET ORAL
Refills: 0 | DISCHARGE

## 2024-05-19 RX ORDER — ACETAMINOPHEN 500 MG
650 TABLET ORAL EVERY 6 HOURS
Refills: 0 | Status: DISCONTINUED | OUTPATIENT
Start: 2024-05-19 | End: 2024-05-30

## 2024-05-19 RX ORDER — ACETAMINOPHEN 500 MG
975 TABLET ORAL ONCE
Refills: 0 | Status: COMPLETED | OUTPATIENT
Start: 2024-05-19 | End: 2024-05-19

## 2024-05-19 RX ORDER — DEXTROSE 10 % IN WATER 10 %
125 INTRAVENOUS SOLUTION INTRAVENOUS ONCE
Refills: 0 | Status: DISCONTINUED | OUTPATIENT
Start: 2024-05-19 | End: 2024-06-04

## 2024-05-19 RX ORDER — DEXTROSE 50 % IN WATER 50 %
25 SYRINGE (ML) INTRAVENOUS ONCE
Refills: 0 | Status: DISCONTINUED | OUTPATIENT
Start: 2024-05-19 | End: 2024-06-04

## 2024-05-19 RX ORDER — ASPIRIN/CALCIUM CARB/MAGNESIUM 324 MG
81 TABLET ORAL DAILY
Refills: 0 | Status: DISCONTINUED | OUTPATIENT
Start: 2024-05-19 | End: 2024-05-23

## 2024-05-19 RX ORDER — INSULIN LISPRO 100/ML
VIAL (ML) SUBCUTANEOUS
Refills: 0 | Status: DISCONTINUED | OUTPATIENT
Start: 2024-05-19 | End: 2024-05-29

## 2024-05-19 RX ORDER — ERTAPENEM SODIUM 1 G/1
1000 INJECTION, POWDER, LYOPHILIZED, FOR SOLUTION INTRAMUSCULAR; INTRAVENOUS EVERY 24 HOURS
Refills: 0 | Status: DISCONTINUED | OUTPATIENT
Start: 2024-05-19 | End: 2024-05-20

## 2024-05-19 RX ORDER — INSULIN LISPRO 100/ML
VIAL (ML) SUBCUTANEOUS AT BEDTIME
Refills: 0 | Status: DISCONTINUED | OUTPATIENT
Start: 2024-05-19 | End: 2024-05-29

## 2024-05-19 RX ORDER — SODIUM CHLORIDE 9 MG/ML
500 INJECTION INTRAMUSCULAR; INTRAVENOUS; SUBCUTANEOUS ONCE
Refills: 0 | Status: COMPLETED | OUTPATIENT
Start: 2024-05-19 | End: 2024-05-19

## 2024-05-19 RX ORDER — SODIUM CHLORIDE 9 MG/ML
1000 INJECTION, SOLUTION INTRAVENOUS
Refills: 0 | Status: DISCONTINUED | OUTPATIENT
Start: 2024-05-19 | End: 2024-06-04

## 2024-05-19 RX ORDER — INSULIN LISPRO 100/ML
10 VIAL (ML) SUBCUTANEOUS
Refills: 0 | Status: DISCONTINUED | OUTPATIENT
Start: 2024-05-19 | End: 2024-05-22

## 2024-05-19 RX ORDER — METOPROLOL TARTRATE 50 MG
25 TABLET ORAL DAILY
Refills: 0 | Status: DISCONTINUED | OUTPATIENT
Start: 2024-05-19 | End: 2024-05-25

## 2024-05-19 RX ORDER — GLUCAGON INJECTION, SOLUTION 0.5 MG/.1ML
1 INJECTION, SOLUTION SUBCUTANEOUS ONCE
Refills: 0 | Status: DISCONTINUED | OUTPATIENT
Start: 2024-05-19 | End: 2024-06-04

## 2024-05-19 RX ORDER — DEXTROSE 50 % IN WATER 50 %
15 SYRINGE (ML) INTRAVENOUS ONCE
Refills: 0 | Status: DISCONTINUED | OUTPATIENT
Start: 2024-05-19 | End: 2024-06-04

## 2024-05-19 RX ORDER — LIDOCAINE 4 G/100G
1 CREAM TOPICAL ONCE
Refills: 0 | Status: COMPLETED | OUTPATIENT
Start: 2024-05-19 | End: 2024-05-19

## 2024-05-19 RX ADMIN — SODIUM CHLORIDE 500 MILLILITER(S): 9 INJECTION INTRAMUSCULAR; INTRAVENOUS; SUBCUTANEOUS at 13:06

## 2024-05-19 RX ADMIN — PIPERACILLIN AND TAZOBACTAM 200 GRAM(S): 4; .5 INJECTION, POWDER, LYOPHILIZED, FOR SOLUTION INTRAVENOUS at 13:07

## 2024-05-19 RX ADMIN — Medication 1: at 22:39

## 2024-05-19 NOTE — H&P ADULT - PROBLEM SELECTOR PLAN 1
-Heavy metal testing, HIV, TSH, RPR, Lyme, B1, B6, B12 -Heavy metal testing, HIV, TSH, RPR, Lyme, B1, B6, B12, CK  -MRI evaluation for acute cord -Heavy metal testing, HIV, TSH, RPR, Lyme, B1, B6, B12, CK  -MRI evaluation for cord compression -Heavy metal testing, HIV, TSH, RPR, , B1, B6, B12, CK  -MRI evaluation for cord compression LE weakness with new pain and anterolateral numbness L>R  Heavy metal testing, HIV, TSH, RPR, , B1, B6, B12, CK  -MRI evaluation for cord compression given worsening LE weakness with new anterolateral b/l numbness.  -rectal tone intact on ED exam, patient denies fecal incontinence or saddle anesthesia  fall precautions  PT consult  lidocaine and acetaminophen PRN, ordered tramadol 25mg x1 PRN severe pain (not standing as patient reports it has caused drop in his BP in the past however notes BP has been increased of recent, also due to report of chronic constipation), monitor BP and maintain hold parameters for BP meds.  reports having taken Myoflex gel, chlorzoxazone and Tramadol in the past for pain (iSTOP Reference #: [id number], negative, however patient receives some care in )

## 2024-05-19 NOTE — H&P ADULT - TIME BILLING
Preparing to see the patient including review of tests and other providers' notes, confirming history with patient, performing medical examination and evaluation, counseling and educating the patient, ordering medications procedures, documenting clinical information in the EMR, independently interpreting results and communicating results to the patient, care coordination

## 2024-05-19 NOTE — H&P ADULT - NSHPSOCIALHISTORY_GEN_ALL_CORE
Pt does not drink or smoke. Pt lives with. Pt does not drink, quit smoking 20 years ago Pt lives with best friend and their family.

## 2024-05-19 NOTE — ED PROVIDER NOTE - ATTENDING CONTRIBUTION TO CARE
Attending Statement: I have personally seen and examined this patient. I have fully participated in the care of this patient. I have reviewed all pertinent clinical information, including history physical exam, plan and the Resident's note and agree except as noted  Patient from home chief complaint "I need to be admitted for a urine infection".  States he was seen in the emergency department was discharged and was called back for "a bad urine infection".  Per chart review patient was evaluated on April 27, 2024 and was called back on May 3 for Proteus ESBL in the urine.  Patient presents today for evaluation.  Patient endorsing chronic back pain that radiates down both legs right greater than left.  Has intermittent numbness to both feet for "a long time" no new numbness no new weakness.  No bowel incontinence, patient has a chronic indwelling catheter.  Denies fever or chills.  No chest pain no abdominal pain.  No nausea no vomiting.  Vital signs appreciated.  Patient ANO x 3 nontoxic-appearing.  Has no work of breathing.  Benign abdomen.  Moving all extremities.  Ambulatory.  Rectal discussed with resident.  No leg swelling appreciated no calf tenderness bilaterally. Attending Statement: I have personally seen and examined this patient. I have fully participated in the care of this patient. I have reviewed all pertinent clinical information, including history physical exam, plan and the Resident's note and agree except as noted  73-year-old male with past medical history of diabetes, spinal stenosis complicated by neurogenic bladder with chronic Hernandez,  Patient from home chief complaint "I need to be admitted for a urine infection".  States he was seen in the emergency department was discharged and was called back for "a bad urine infection".  Per chart review patient was evaluated on April 27, 2024 and was called back on May 3 for Proteus ESBL in the urine.  Patient presents today for evaluation.  Patient endorsing chronic back pain that radiates down both legs right greater than left.  Has intermittent numbness to both feet for "a long time" no new numbness no new weakness.  No bowel incontinence, patient has a chronic indwelling catheter.  Denies fever or chills.  No chest pain no abdominal pain.  No nausea no vomiting.  Vital signs appreciated.  Patient ANO x 3 nontoxic-appearing.  Has no work of breathing.  Benign abdomen.  Moving all extremities.  Ambulatory.  Rectal discussed with resident.  No leg swelling appreciated no calf tenderness bilaterally.  plan labs, ua, pt refused to have hernandez changed, pain med, IV antibiotics and tba

## 2024-05-19 NOTE — ED ADULT NURSE REASSESSMENT NOTE - NS ED NURSE REASSESS COMMENT FT1
Patient received, appears to be resting comfortably in bed, no complaints noted at this time. Breathing even and unlabored, pallor/diaphoresis not noted. will continue to monitor.

## 2024-05-19 NOTE — H&P ADULT - PROBLEM SELECTOR PLAN 2
-At home on 40lantus and 16 novolog TID  -20 lantus and 10U TID  -SSI -At home on 40lantus and 16 novolog TID  -32 lantus and 10U TID  -SSI -+UA for Nitrates, LE, and bacteria pt has chronic hernandez, asymptomatic, without fevers and afebrile  -S/P zosyn, currently on ertapenem will continue pending ID recs (consulted by ED), f/u recs  -f/u repeat UCx, BCx  -of note patient with known atrophic left kidney (dx'ed in 40s per pt) - monitor renal function/trend Cr    patient with known large bladder calculus   with reported recurrent history of Proteus infxn  R perinephric fat stranding noted on imaging  denies fevers/chills

## 2024-05-19 NOTE — H&P ADULT - NSHPPHYSICALEXAM_GEN_ALL_CORE
T(C): 36.8 (05-19-24 @ 20:15), Max: 36.8 (05-19-24 @ 17:33)  HR: 83 (05-19-24 @ 20:15) (65 - 85)  BP: 165/90 (05-19-24 @ 20:15) (154/82 - 165/90)  RR: 18 (05-19-24 @ 20:15) (16 - 18)  SpO2: 97% (05-19-24 @ 20:15) (97% - 99%)    CONSTITUTIONAL: Well groomed, no apparent distress  EYES: PERRLA and symmetric, EOMI, No conjunctival or scleral injection, non-icteric  ENMT: Oral mucosa with moist membranes. Normal dentition; no pharyngeal injection or exudates  RESP: No respiratory distress, no use of accessory muscles; CTA b/l, no WRR  CV: RRR, +S1S2, no MRG; no JVD; no peripheral edema  GI: Soft, NT, ND, no rebound, no guarding; no palpable masses; no hepatosplenomegaly; no hernia palpated  LYMPH: No cervical LAD or tenderness; no axillary LAD or tenderness; no inguinal LAD or tenderness  MSK: Normal gait; No digital clubbing or cyanosis; examination of the (head/neck/spine/ribs/pelvis, RUE, LUE, RLE, LLE) without misalignment,            Normal ROM without pain, no spinal tenderness, normal muscle strength/tone  SKIN: No rashes or ulcers noted; no subcutaneous nodules or induration palpable  NEURO: CN II-XII intact; normal reflexes in upper and lower extremities, sensation intact in upper and lower extremities b/l to light touch   PSYCH: Appropriate insight/judgment; A+O x 3, mood and affect appropriate, recent/remote memory intact T(C): 36.8 (05-19-24 @ 20:15), Max: 36.8 (05-19-24 @ 17:33)  HR: 83 (05-19-24 @ 20:15) (65 - 85)  BP: 165/90 (05-19-24 @ 20:15) (154/82 - 165/90)  RR: 18 (05-19-24 @ 20:15) (16 - 18)  SpO2: 97% (05-19-24 @ 20:15) (97% - 99%)    CONSTITUTIONAL: Well groomed, no apparent distress  EYES: PERRLA and symmetric, EOMI, No conjunctival or scleral injection, non-icteric  ENMT: Oral mucosa with moist membranes. Normal dentition; no pharyngeal injection or exudates  RESP: No respiratory distress, no use of accessory muscles; CTA b/l, no WRR  CV: RRR, +S1S2, no MRG; no JVD; no peripheral edema  GI: Soft, NT, ND, no rebound, no guarding; no palpable masses; no hepatosplenomegaly; no hernia palpated  MSK: 5/5 UE, 4/5 LE, Sensation intact UE and LE. Gait testing abnormal unsteady on his feet, shuffling gait  SKIN: No rashes or ulcers noted; no subcutaneous nodules or induration palpable  NEURO: CN II-XII intact; normal reflexes in upper and lower extremities, sensation intact in upper and lower extremities b/l to light touch   PSYCH: Appropriate insight/judgment; A+O x 3, mood and affect appropriate, recent/remote memory intact T(C): 36.8 (05-19-24 @ 20:15), Max: 36.8 (05-19-24 @ 17:33)  HR: 83 (05-19-24 @ 20:15) (65 - 85)  BP: 165/90 (05-19-24 @ 20:15) (154/82 - 165/90)  RR: 18 (05-19-24 @ 20:15) (16 - 18)  SpO2: 97% (05-19-24 @ 20:15) (97% - 99%)    CONSTITUTIONAL: Well groomed, no apparent distress  EYES: PERRLA and symmetric, EOMI, No conjunctival or scleral injection, non-icteric  ENMT: Oral mucosa with moist membranes. Normal dentition; no pharyngeal injection or exudates  RESP: No respiratory distress, no use of accessory muscles; CTA b/l, no WRR  CV: RRR, +S1S2, no MRG; no JVD; no peripheral edema  GI: Soft, NT, ND, no rebound, no guarding; no palpable masses; no hepatosplenomegaly; no hernia palpated  MSK: 5/5 UE, 4/5 LE, Sensation intact UE and LE. Gait testing abnormal unsteady on his feet, shuffling gait, 2+ reflexes throughout  SKIN: No rashes or ulcers noted; no subcutaneous nodules or induration palpable  NEURO: CN II-XII intact; normal reflexes in upper and lower extremities, sensation intact in upper and lower extremities b/l to light touch   PSYCH: Appropriate insight/judgment; A+O x 3, mood and affect appropriate, recent/remote memory intact T(C): 36.8 (05-19-24 @ 20:15), Max: 36.8 (05-19-24 @ 17:33)  HR: 83 (05-19-24 @ 20:15) (65 - 85)  BP: 165/90 (05-19-24 @ 20:15) (154/82 - 165/90)  RR: 18 (05-19-24 @ 20:15) (16 - 18)  SpO2: 97% (05-19-24 @ 20:15) (97% - 99%)    CONSTITUTIONAL: Well groomed, no apparent distress  EYES: PERRLA and symmetric, EOMI, No conjunctival or scleral injection, non-icteric  ENMT: Oral mucosa with moist membranes. Normal dentition; no pharyngeal injection or exudates  RESP: No respiratory distress, no use of accessory muscles; CTA b/l, no WRR  CV: RRR, +S1S2, no MRG; no JVD; no peripheral edema  GI: Soft, NT, ND, no rebound, no guarding; no palpable masses; no hepatosplenomegaly; no hernia palpated; clear yellow urine in hernandez  MSK: 5/5 UE, 4/5 LE, Sensation intact UE and LE. Gait testing abnormal unsteady on his feet, shuffling gait, 2+ reflexes throughout  SKIN: No rashes or ulcers noted; no subcutaneous nodules or induration palpable  NEURO: CN II-XII intact; normal reflexes in upper and lower extremities, negative straight leg raise test; sensation slightly decreased L>R anterolateral b/l legs, strength 4/5 b/l LE  PSYCH: Appropriate insight/judgment; A+O x 3, mood and affect appropriate, recent/remote memory intact

## 2024-05-19 NOTE — ED PROVIDER NOTE - PHYSICAL EXAMINATION
GENERAL: NAD  HEENT:  Atraumatic  CHEST/LUNG: Chest rise equal bilaterally  HEART: Regular rate and rhythm  ABDOMEN: Soft, Nontender, Nondistended  EXTREMITIES:  Extremities warm  PSYCH: A&Ox3  SKIN: No obvious rashes or lesions  MSK: No cervical spine TTP, able to range neck to the left and right  NEUROLOGY: strength and sensation intact in all extremities. Ambulatory w/ cane.  Rectal sphincter tone intact , chaperone Timuv Pafalejandra RN

## 2024-05-19 NOTE — H&P ADULT - ATTENDING COMMENTS
73M w/IDDM2, spinal stenosis complicated by neurogenic bladder, chronic hernandez, with recurrent Proteus infections since 12/2024, with known bladder calculus, reporting LE pain (constant, severe, ache extends to buttocks, no back pain), w/new numbness b/l anterolateral legs, w/(+)UA, concern for colonization. ED contacted ID, admitted for eval. f/u ID recs re need for continued abx however imaging with perinephric stranding patient afebrile without leukocytosis. He reports he never took cefdinir he was discharged home with from prior ED visit as he states he "knew it wouldn't cover Proteus" because it's a "3rd generation cephalosporin." He notes his hernandez was recently changed on Wednesday in the  (where he lives during the winter). Patient had spinal imaging in the ED showing high-grade bilateral C2-C3, C3-C4, C5-C6, and C6-C7 foraminal stenosis, as well as high-grade C5-C6 central canal stenosis due to advanced disc and uncovertebral joint degeneration. Given new sx ordered for MR imaging. Patient noting chronic cough productive of yellow/green sputum -> cxr ordered for eval.

## 2024-05-19 NOTE — H&P ADULT - PROBLEM SELECTOR PROBLEM 3
Prophylactic measure Abnormal urinalysis Type 2 diabetes mellitus, with long-term current use of insulin

## 2024-05-19 NOTE — ED ADULT TRIAGE NOTE - CHIEF COMPLAINT QUOTE
pt living with DM type2, c/o of lower back pain radiating to BLE for few days, pt recently treated for UTI, c/o of mild dysuria as well

## 2024-05-19 NOTE — ED PROVIDER NOTE - PROGRESS NOTE DETAILS
Patient has been stable.  No white count normal creatinine no lactate pending CT reads and urine to results of pending admission.  Signed out to night team at end of shift endorsed to Dr. Hurt Kishan BUCK: Infectious disease consulted for proteus mirabilis UTI infection.

## 2024-05-19 NOTE — ED PROVIDER NOTE - CARE PLAN
Principal Discharge DX:	Lower extremity weakness   1 Principal Discharge DX:	Lower extremity weakness  Secondary Diagnosis:	Urinary tract infection

## 2024-05-19 NOTE — H&P ADULT - ASSESSMENT
Mr. Mayorga is a 73-year-old male with past medical history of diabetes, spinal stenosis complicated by neurogenic bladder with chronic Salazar presenting for lower extremity weakess.

## 2024-05-19 NOTE — H&P ADULT - NSHPREVIEWOFSYSTEMS_GEN_ALL_CORE
REVIEW OF SYSTEMS:    CONSTITUTIONAL:  No weakness, fevers or chills  EYES/ENT:  No visual changes;  No vertigo or throat pain   NECK:  No pain or stiffness  RESPIRATORY:  No cough, wheezing, hemoptysis; No shortness of breath  CARDIOVASCULAR:  No chest pain or palpitations  GASTROINTESTINAL:  No abdominal or epigastric pain. No nausea, vomiting, or hematemesis; No diarrhea or constipation. No melena or hematochezia.  GENITOURINARY:  No dysuria, frequency or hematuria  MUSCULOSKELETAL:  FROM all extremities, normal strength  NEUROLOGICAL:  No numbness or weakness  SKIN:  No itching, rashes REVIEW OF SYSTEMS:    CONSTITUTIONAL:  No weakness, fevers or chills  EYES/ENT:  No visual changes;  No vertigo or throat pain   NECK:  No pain or stiffness  RESPIRATORY:  No cough, wheezing, hemoptysis; No shortness of breath  CARDIOVASCULAR:  No chest pain or palpitations  GASTROINTESTINAL:  No abdominal or epigastric pain. No nausea, vomiting, or hematemesis; No diarrhea or constipation. No melena or hematochezia.  GENITOURINARY:  No dysuria, frequency or hematuria  MUSCULOSKELETAL:  FROM all extremities  NEUROLOGICAL:  + numbness or weakness  SKIN:  No itching, rashes REVIEW OF SYSTEMS:    CONSTITUTIONAL:  No weakness, fevers or chills  EYES/ENT:  No visual changes;  No vertigo or throat pain   NECK:  No pain or stiffness  RESPIRATORY:  (+) chronic cough productive of yellow/green sputum; No wheezing, hemoptysis; No shortness of breath  CARDIOVASCULAR:  No chest pain or palpitations; No LE edema  GASTROINTESTINAL:  No abdominal or epigastric pain. No nausea, vomiting, or hematemesis; No diarrhea; (+) intermittent constipation. No melena or hematochezia. No incontinence   GENITOURINARY:  No dysuria, frequency or hematuria; chronic hernandez  MUSCULOSKELETAL:  FROM all extremities; reports fall in shower earlier this week onto buttocks (no head trauma); No back pain  NEUROLOGICAL:  + numbness as per HPI; No saddle anesthesia (denied perianal anesthesia); (+)LE weakness  SKIN:  No itching, rashes

## 2024-05-19 NOTE — ED PROVIDER NOTE - OBJECTIVE STATEMENT
73-year-old male with past medical history of diabetes, spinal stenosis complicated by neurogenic bladder with chronic Hernandez, presenting to the ED c/o 1 week history of increasing bilateral lower extremity numbness/tingling and increased difficulty ambulating. Pt was recently informed that his urine culture is positive but has not been taking an antibiotic. Admits to numbness in the rectal region as well. Chronic hernandez catheter, unable to assess urinary incontinence or retention. Denies fevers, chills, nausea, vomiting, dizziness, chest pain, SOB, abdominal pain.

## 2024-05-19 NOTE — ED PROVIDER NOTE - CLINICAL SUMMARY MEDICAL DECISION MAKING FREE TEXT BOX
73-year-old male with past medical history of diabetes, spinal stenosis complicated by neurogenic bladder with chronic Salazar, presenting to the ED c/o 1 week history of increasing bilateral lower extremity numbness/tingling and increased difficulty ambulating. Pt was recently informed that his urine culture is positive but has not been taking an antibiotic. Admits to numbness in the rectal region as well. Chronic Salazar catheter, unable to assess urinary incontinence or retention. Denies fevers, chills, nausea, vomiting, dizziness, chest pain, SOB, abdominal pain. Rectal sphincter tone intact. Pt able to stand and ambulate a few steps, low concern for cauda equina at this time. Labs/pain control, CT scan, and reassess w/ potential admission for PT/OT decreased in ambulation.

## 2024-05-19 NOTE — H&P ADULT - PROBLEM SELECTOR PLAN 3
Diet: Consistent carb  DVT-P: Lovenox  Dispo: pending medical optimization -+UA for Nitrates, LE, and bacteria pt has chronic hernandez would hold treatment in context of lack of symptoms and no infectious findings -+UA for Nitrates, LE, and bacteria pt has chronic hernandez would hold treatment in context of lack of symptoms and no infectious findings  -S/P zosyn, currently on ertapenem  -ID consulted would appreciate recs -At home on 40lantus and 16 novolog TID  -32 lantus and 10U TID  -SSI

## 2024-05-19 NOTE — H&P ADULT - NSHPLABSRESULTS_GEN_ALL_CORE
LABS:                          13.9   6.90  )-----------( 204      ( 19 May 2024 11:42 )             39.5     -    137  |  103  |  30<H>  ----------------------------<  204<H>  4.3   |  21<L>  |  1.26    Ca    9.7      19 May 2024 11:42  Phos  3.0       Mg     2.00         TPro  6.9  /  Alb  4.0  /  TBili  0.4  /  DBili  x   /  AST  12  /  ALT  11  /  AlkPhos  37<L>      LIVER FUNCTIONS - ( 19 May 2024 11:42 )  Alb: 4.0 g/dL / Pro: 6.9 g/dL / ALK PHOS: 37 U/L / ALT: 11 U/L / AST: 12 U/L / GGT: x           PT/INR - ( 19 May 2024 11:42 )   PT: 10.7 sec;   INR: 0.95 ratio         PTT - ( 19 May 2024 11:42 )  PTT:28.6 sec  Urinalysis Basic - ( 19 May 2024 16:26 )    Color: Yellow / Appearance: Clear / S.015 / pH: x  Gluc: x / Ketone: Negative mg/dL  / Bili: Negative / Urobili: 0.2 mg/dL   Blood: x / Protein: Trace mg/dL / Nitrite: Positive   Leuk Esterase: Large / RBC: 1 /HPF / WBC 51 /HPF   Sq Epi: x / Non Sq Epi: 1 /HPF / Bacteria: Moderate /HPF 13.9   6.90  )-----------( 204      ( 19 May 2024 11:42 )             39.5     137  |  103  |  30<H>  ----------------------------<  204<H>       4.3   |  21<L>  |  1.26    Ca    9.7      19 May 2024 11:42  Phos  3.0       Mg     2.00         TPro  6.9  /  Alb  4.0  /  TBili  0.4  /  DBili  x   /  AST  12  /  ALT  11  /  AlkPhos  37<L>      PT/INR: 10.7/0.95 (24 @ 11:42)  PTT: 28.6 (24 @ 11:42)    12:08 - VBG - pH: 7.41  | pCO2: 39    | pO2: 52    | Lactate: 1.6      Urinalysis Basic - ( 19 May 2024 16:26 )  Color: Yellow / Appearance: Clear / S.015 / pH: 7.0  Gluc: Negative mg/dL / Ketone: Negative mg/dL  / Bili: Negative / Urobili: 0.2 mg/dL   Blood: Trace / Protein: Trace mg/dL / Nitrite: Positive   Leuk Esterase: Large / RBC: 1 /HPF / WBC 51 /HPF   Sq Epi: 1 /HPF / Bacteria: Moderate /HPF    < from: CT Cervical Spine No Cont (24 @ 14:09) >/< from: CT Thoracic Spine No Cont (24 @ 14:09) >/< from: CT Lumbar Spine No Cont (24 @ 14:08) >  CT CERVICAL SPINE: Reversed cervical lordosis associated with the apex at C4 associated with advanced C3-C4, C4-C5, C5-C6, C6-C7 disc degeneration and anterior wedging. Uncovertebral joint spurring contributes to high-grade bilateral C2-C3, C3-C4, C5-C6, and C6-C7 foraminal stenosis, as well as at least mild to moderate C5-C6 central canal stenosis. There is no prevertebral soft tissue swelling. There is grade 1 anterolisthesis of C2 on C3 and C3 on C4. Grade 1 retrolisthesis of C5 on C6 and C6 on C7. The atlantodental and atlanto-occipital joints are maintained. Articular facets and posterior elements alignment is maintained. The partially imaged lung apices are clear.  CT THORACIC SPINE: Thoracic kyphosis and lumbar lordosis are maintained. No prevertebral soft tissue swelling. Vertebral body heights and alignment are maintained. Multilevel disc degeneration with small anterior osteophyte formation and endplate irregularity throughout the thoracolumbar spine. Disc degeneration most significant at L5-S1 with complete loss of disc height and vacuum change. Lower lumbar disc bulging indents ventral thecal sac and encroaches the inferiorforaminal fat bilaterally. Atrophy of the left kidney. Right perinephric fat stranding, partially imaged. Salazar catheter in place. Redemonstration of large bladder calculus. Bibasilar dependent atelectasis. Calcified plaque along the aorta, namely along the infrarenal segment and iliac arteries bilaterally. Coronary artery arteriosclerosis along the left main coronary, LAD, and circumflex.  IMPRESSION:   CT CERVICAL SPINE: No fracture. Reversed lordosis centered at C4 associated with advanced disc degeneration and anterior wedging. High-grade bilateral C2-C3, C3-C4, C5-C6, and C6-C7 foraminal stenosis, as well as high-grade C5-C6 central canal stenosis due to advanced disc and uncovertebral joint degeneration.   CT THORACOLUMBAR SPINE: No fracture. Multilevel spondylosis. Coronary artery arteriosclerosis. Consider coronary CTA.   < end of copied text > 13.9   6.90  )-----------( 204      ( 19 May 2024 11:42 )             39.5     137  |  103  |  30<H>  ----------------------------<  204<H>       4.3   |  21<L>  |  1.26    Ca    9.7      19 May 2024 11:42  Phos  3.0       Mg     2.00         TPro  6.9  /  Alb  4.0  /  TBili  0.4  /  DBili  x   /  AST  12  /  ALT  11  /  AlkPhos  37<L>      PT/INR: 10.7/0.95 (24 @ 11:42)  PTT: 28.6 (24 @ 11:42)    12:08 - VBG - pH: 7.41  | pCO2: 39    | pO2: 52    | Lactate: 1.6      Urinalysis Basic - ( 19 May 2024 16:26 )  Color: Yellow / Appearance: Clear / S.015 / pH: 7.0  Gluc: Negative mg/dL / Ketone: Negative mg/dL  / Bili: Negative / Urobili: 0.2 mg/dL   Blood: Trace / Protein: Trace mg/dL / Nitrite: Positive   Leuk Esterase: Large / RBC: 1 /HPF / WBC 51 /HPF   Sq Epi: 1 /HPF / Bacteria: Moderate /HPF    < from: CT Cervical Spine No Cont (24 @ 14:09) >/< from: CT Thoracic Spine No Cont (24 @ 14:09) >/< from: CT Lumbar Spine No Cont (24 @ 14:08) >  CT CERVICAL SPINE: Reversed cervical lordosis associated with the apex at C4 associated with advanced C3-C4, C4-C5, C5-C6, C6-C7 disc degeneration and anterior wedging. Uncovertebral joint spurring contributes to high-grade bilateral C2-C3, C3-C4, C5-C6, and C6-C7 foraminal stenosis, as well as at least mild to moderate C5-C6 central canal stenosis. There is no prevertebral soft tissue swelling. There is grade 1 anterolisthesis of C2 on C3 and C3 on C4. Grade 1 retrolisthesis of C5 on C6 and C6 on C7. The atlantodental and atlanto-occipital joints are maintained. Articular facets and posterior elements alignment is maintained. The partially imaged lung apices are clear.  CT THORACIC SPINE: Thoracic kyphosis and lumbar lordosis are maintained. No prevertebral soft tissue swelling. Vertebral body heights and alignment are maintained. Multilevel disc degeneration with small anterior osteophyte formation and endplate irregularity throughout the thoracolumbar spine. Disc degeneration most significant at L5-S1 with complete loss of disc height and vacuum change. Lower lumbar disc bulging indents ventral thecal sac and encroaches the inferior foraminal fat bilaterally. Atrophy of the left kidney. Right perinephric fat stranding, partially imaged. Salazar catheter in place. Redemonstration of large bladder calculus. Bibasilar dependent atelectasis. Calcified plaque along the aorta, namely along the infrarenal segment and iliac arteries bilaterally. Coronary artery arteriosclerosis along the left main coronary, LAD, and circumflex.  IMPRESSION:   CT CERVICAL SPINE: No fracture. Reversed lordosis centered at C4 associated with advanced disc degeneration and anterior wedging. High-grade bilateral C2-C3, C3-C4, C5-C6, and C6-C7 foraminal stenosis, as well as high-grade C5-C6 central canal stenosis due to advanced disc and uncovertebral joint degeneration.   CT THORACOLUMBAR SPINE: No fracture. Multilevel spondylosis. Coronary artery arteriosclerosis. Consider coronary CTA.   < end of copied text >

## 2024-05-19 NOTE — H&P ADULT - NSICDXPASTMEDICALHX_GEN_ALL_CORE_FT
PAST MEDICAL HISTORY:  Chronic indwelling Salazar catheter     Diabetes     Neurogenic bladder      PAST MEDICAL HISTORY:  CAD (coronary artery disease)     Chronic indwelling Salazar catheter     Diabetes     Hypertension     Neurogenic bladder

## 2024-05-19 NOTE — H&P ADULT - HISTORY OF PRESENT ILLNESS
Mr. Mayorga is a 73-year-old male with past medical history of diabetes, spinal stenosis complicated by neurogenic bladder with chronic Salazar presenting for lower extremity weakess. Pt was previous found to ESBL + in early may.   FARCOLDER  Numbness    GBS vs myopathy vs acute cord vs TsH/HIV/rpr   lyme, b1/b6b/b12/heavy metals   fevers, chills, SOB, chest pain, abdominal pain, urinary incontinence, fecal incontinence, numbness, weakness, strength     In the ED VS notable for BP of 160/80. In the ED labs  notable for  and UA + for LE, nitrates, and bacteria. Imaging was notable for cervical stenosis and lordosis and thoracolumbar spine spondylosis.  In the ED pt received Zosyn and 0.5L NS.  Mr. Mayorga is a 73-year-old male with past medical history of diabetes, spinal stenosis complicated by neurogenic bladder with chronic Salazar , HTN presenting for lower extremity weakess, per pt there is always component of baseline weakness as pt has used a cane to walk in the past. This last week pt reported struggling to ambulate and having pain going down their lateral leg along with numbness on anterior surface of their leg. Pt reported taking tylenol and alieve for these symptoms but there was no symptom improvement. Pt reports having to use furniture and a walker to ambulate. Pt denies fevers, chills, chest pain, or fecal incontinence Pt has chronic hx of urinary incontinence in the context of spinal stenosis. Pt was previous found to be ESBL + in early may.     In the ED VS notable for BP of 160/80. In the ED labs  notable for  and UA + for LE, nitrates, and bacteria. Imaging was notable for cervical stenosis and lordosis and thoracolumbar spine spondylosis.  In the ED pt received Zosyn and 0.5L NS.  Mr. Mayorga is a 73-year-old male with past medical history of diabetes, spinal stenosis complicated by neurogenic bladder with chronic Salazar , HTN presenting for lower extremity weakess, per pt there is always component of baseline weakness as pt has used a cane to walk in the past. This last week pt reported struggling to ambulate and having pain going down their lateral leg along with numbness on anterior surface of their leg. Pt reported taking tylenol and alieve for these symptoms but there was no symptom improvement. Pt reports having to use furniture and a walker to ambulate. Pt denies fevers, chills, chest pain, dysuria,  or fecal incontinence Pt has chronic hx of urinary incontinence in the context of spinal stenosis. Pt was previous found to be ESBL + in early may.     In the ED VS notable for BP of 160/80. In the ED labs  notable for  and UA + for LE, nitrates, and bacteria. Imaging was notable for cervical stenosis and lordosis and thoracolumbar spine spondylosis.  In the ED pt received Zosyn and 0.5L NS.  Mr. Mayorga is a 73-year-old male with past medical history of IDDM2, spinal stenosis complicated by neurogenic bladder with chronic Salazar, HTN presenting for lower extremity weakness, per pt there is always component of baseline weakness as pt has used a cane to walk in the past. This last week pt reported struggling to ambulate due to pain going down b/l lateral legs along with numbness on anterolateral surface of leg. Pt reported taking tylenol and Aleve for these symptoms but there was no symptom improvement. Pt reports having to use furniture and a walker to ambulate. Pt denies fevers, chills, chest pain, dysuria, saddle anesthesia or fecal incontinence Pt has chronic hx of urinary incontinence in the context of spinal stenosis. Pt was previous found to be ESBL + in early may (patient reports history of Proteus infection in December 2023 s/p abx with recurrence shortly thereafter).     Salazar was changed on Wednesday in the DR.    In the ED VS notable for BP of 160/80. In the ED labs  notable for  and UA + for LE, nitrates, and bacteria. Imaging was notable for cervical stenosis and lordosis and thoracolumbar spine spondylosis.  In the ED pt received Zosyn and 0.5L NS.

## 2024-05-19 NOTE — H&P ADULT - PROBLEM SELECTOR PLAN 6
Diet: Consistent carb  DVT-P: Lovenox  Dispo: pending medical optimization Diet: Consistent carb  DVT-P: Lovenox pending MR imaging  Dispo: pending medical optimization, patient is requesting copies of imaging/labs prior to discharge    #constipation  senna PRN  avoid Miralax per patient request

## 2024-05-20 DIAGNOSIS — I25.10 ATHEROSCLEROTIC HEART DISEASE OF NATIVE CORONARY ARTERY WITHOUT ANGINA PECTORIS: ICD-10-CM

## 2024-05-20 DIAGNOSIS — Z95.1 PRESENCE OF AORTOCORONARY BYPASS GRAFT: Chronic | ICD-10-CM

## 2024-05-20 DIAGNOSIS — R82.90 UNSPECIFIED ABNORMAL FINDINGS IN URINE: ICD-10-CM

## 2024-05-20 DIAGNOSIS — M48.00 SPINAL STENOSIS, SITE UNSPECIFIED: ICD-10-CM

## 2024-05-20 DIAGNOSIS — N39.0 URINARY TRACT INFECTION, SITE NOT SPECIFIED: ICD-10-CM

## 2024-05-20 DIAGNOSIS — A49.8 OTHER BACTERIAL INFECTIONS OF UNSPECIFIED SITE: ICD-10-CM

## 2024-05-20 DIAGNOSIS — Z90.49 ACQUIRED ABSENCE OF OTHER SPECIFIED PARTS OF DIGESTIVE TRACT: Chronic | ICD-10-CM

## 2024-05-20 DIAGNOSIS — I10 ESSENTIAL (PRIMARY) HYPERTENSION: ICD-10-CM

## 2024-05-20 DIAGNOSIS — R82.79 OTHER ABNORMAL FINDINGS ON MICROBIOLOGICAL EXAMINATION OF URINE: ICD-10-CM

## 2024-05-20 DIAGNOSIS — E11.9 TYPE 2 DIABETES MELLITUS WITHOUT COMPLICATIONS: ICD-10-CM

## 2024-05-20 LAB
A1C WITH ESTIMATED AVERAGE GLUCOSE RESULT: 8 % — HIGH (ref 4–5.6)
ALBUMIN SERPL ELPH-MCNC: 3.9 G/DL — SIGNIFICANT CHANGE UP (ref 3.3–5)
ALP SERPL-CCNC: 34 U/L — LOW (ref 40–120)
ALT FLD-CCNC: 13 U/L — SIGNIFICANT CHANGE UP (ref 4–41)
ANION GAP SERPL CALC-SCNC: 12 MMOL/L — SIGNIFICANT CHANGE UP (ref 7–14)
AST SERPL-CCNC: 10 U/L — SIGNIFICANT CHANGE UP (ref 4–40)
BASOPHILS # BLD AUTO: 0.05 K/UL — SIGNIFICANT CHANGE UP (ref 0–0.2)
BASOPHILS NFR BLD AUTO: 0.8 % — SIGNIFICANT CHANGE UP (ref 0–2)
BILIRUB SERPL-MCNC: 0.4 MG/DL — SIGNIFICANT CHANGE UP (ref 0.2–1.2)
BUN SERPL-MCNC: 25 MG/DL — HIGH (ref 7–23)
CALCIUM SERPL-MCNC: 9.7 MG/DL — SIGNIFICANT CHANGE UP (ref 8.4–10.5)
CHLORIDE SERPL-SCNC: 104 MMOL/L — SIGNIFICANT CHANGE UP (ref 98–107)
CK SERPL-CCNC: 79 U/L — SIGNIFICANT CHANGE UP (ref 30–200)
CO2 SERPL-SCNC: 20 MMOL/L — LOW (ref 22–31)
CREAT SERPL-MCNC: 1.19 MG/DL — SIGNIFICANT CHANGE UP (ref 0.5–1.3)
EGFR: 65 ML/MIN/1.73M2 — SIGNIFICANT CHANGE UP
EOSINOPHIL # BLD AUTO: 0.15 K/UL — SIGNIFICANT CHANGE UP (ref 0–0.5)
EOSINOPHIL NFR BLD AUTO: 2.5 % — SIGNIFICANT CHANGE UP (ref 0–6)
ESTIMATED AVERAGE GLUCOSE: 183 — SIGNIFICANT CHANGE UP
GLUCOSE SERPL-MCNC: 209 MG/DL — HIGH (ref 70–99)
HCT VFR BLD CALC: 39.7 % — SIGNIFICANT CHANGE UP (ref 39–50)
HGB BLD-MCNC: 13.8 G/DL — SIGNIFICANT CHANGE UP (ref 13–17)
HIV 1+2 AB+HIV1 P24 AG SERPL QL IA: SIGNIFICANT CHANGE UP
IANC: 3.48 K/UL — SIGNIFICANT CHANGE UP (ref 1.8–7.4)
IMM GRANULOCYTES NFR BLD AUTO: 0.3 % — SIGNIFICANT CHANGE UP (ref 0–0.9)
LYMPHOCYTES # BLD AUTO: 1.7 K/UL — SIGNIFICANT CHANGE UP (ref 1–3.3)
LYMPHOCYTES # BLD AUTO: 28.6 % — SIGNIFICANT CHANGE UP (ref 13–44)
MAGNESIUM SERPL-MCNC: 2 MG/DL — SIGNIFICANT CHANGE UP (ref 1.6–2.6)
MCHC RBC-ENTMCNC: 31 PG — SIGNIFICANT CHANGE UP (ref 27–34)
MCHC RBC-ENTMCNC: 34.8 GM/DL — SIGNIFICANT CHANGE UP (ref 32–36)
MCV RBC AUTO: 89.2 FL — SIGNIFICANT CHANGE UP (ref 80–100)
MONOCYTES # BLD AUTO: 0.54 K/UL — SIGNIFICANT CHANGE UP (ref 0–0.9)
MONOCYTES NFR BLD AUTO: 9.1 % — SIGNIFICANT CHANGE UP (ref 2–14)
MRSA PCR RESULT.: SIGNIFICANT CHANGE UP
NEUTROPHILS # BLD AUTO: 3.48 K/UL — SIGNIFICANT CHANGE UP (ref 1.8–7.4)
NEUTROPHILS NFR BLD AUTO: 58.7 % — SIGNIFICANT CHANGE UP (ref 43–77)
NRBC # BLD: 0 /100 WBCS — SIGNIFICANT CHANGE UP (ref 0–0)
NRBC # FLD: 0 K/UL — SIGNIFICANT CHANGE UP (ref 0–0)
PHOSPHATE SERPL-MCNC: 3.8 MG/DL — SIGNIFICANT CHANGE UP (ref 2.5–4.5)
PLATELET # BLD AUTO: 193 K/UL — SIGNIFICANT CHANGE UP (ref 150–400)
POTASSIUM SERPL-MCNC: 4.7 MMOL/L — SIGNIFICANT CHANGE UP (ref 3.5–5.3)
POTASSIUM SERPL-SCNC: 4.7 MMOL/L — SIGNIFICANT CHANGE UP (ref 3.5–5.3)
PROT SERPL-MCNC: 6.6 G/DL — SIGNIFICANT CHANGE UP (ref 6–8.3)
RBC # BLD: 4.45 M/UL — SIGNIFICANT CHANGE UP (ref 4.2–5.8)
RBC # FLD: 12.8 % — SIGNIFICANT CHANGE UP (ref 10.3–14.5)
S AUREUS DNA NOSE QL NAA+PROBE: SIGNIFICANT CHANGE UP
SODIUM SERPL-SCNC: 136 MMOL/L — SIGNIFICANT CHANGE UP (ref 135–145)
TSH SERPL-MCNC: 2.25 UIU/ML — SIGNIFICANT CHANGE UP (ref 0.27–4.2)
VIT B12 SERPL-MCNC: 1328 PG/ML — HIGH (ref 200–900)
WBC # BLD: 5.94 K/UL — SIGNIFICANT CHANGE UP (ref 3.8–10.5)
WBC # FLD AUTO: 5.94 K/UL — SIGNIFICANT CHANGE UP (ref 3.8–10.5)

## 2024-05-20 PROCEDURE — 99223 1ST HOSP IP/OBS HIGH 75: CPT | Mod: GC

## 2024-05-20 PROCEDURE — 99223 1ST HOSP IP/OBS HIGH 75: CPT

## 2024-05-20 PROCEDURE — 72146 MRI CHEST SPINE W/O DYE: CPT | Mod: 26

## 2024-05-20 PROCEDURE — 72148 MRI LUMBAR SPINE W/O DYE: CPT | Mod: 26

## 2024-05-20 PROCEDURE — 72141 MRI NECK SPINE W/O DYE: CPT | Mod: 26

## 2024-05-20 PROCEDURE — 71045 X-RAY EXAM CHEST 1 VIEW: CPT | Mod: 26

## 2024-05-20 RX ORDER — INSULIN GLARGINE 100 [IU]/ML
28 INJECTION, SOLUTION SUBCUTANEOUS AT BEDTIME
Refills: 0 | Status: DISCONTINUED | OUTPATIENT
Start: 2024-05-20 | End: 2024-05-22

## 2024-05-20 RX ORDER — CHLORHEXIDINE GLUCONATE 213 G/1000ML
1 SOLUTION TOPICAL DAILY
Refills: 0 | Status: DISCONTINUED | OUTPATIENT
Start: 2024-05-20 | End: 2024-06-04

## 2024-05-20 RX ORDER — MEROPENEM 1 G/30ML
1000 INJECTION INTRAVENOUS EVERY 8 HOURS
Refills: 0 | Status: DISCONTINUED | OUTPATIENT
Start: 2024-05-20 | End: 2024-05-22

## 2024-05-20 RX ORDER — INSULIN GLARGINE 100 [IU]/ML
20 INJECTION, SOLUTION SUBCUTANEOUS ONCE
Refills: 0 | Status: DISCONTINUED | OUTPATIENT
Start: 2024-05-20 | End: 2024-05-20

## 2024-05-20 RX ORDER — TRAMADOL HYDROCHLORIDE 50 MG/1
25 TABLET ORAL ONCE
Refills: 0 | Status: DISCONTINUED | OUTPATIENT
Start: 2024-05-20 | End: 2024-05-22

## 2024-05-20 RX ORDER — INSULIN GLARGINE 100 [IU]/ML
32 INJECTION, SOLUTION SUBCUTANEOUS ONCE
Refills: 0 | Status: COMPLETED | OUTPATIENT
Start: 2024-05-20 | End: 2024-05-20

## 2024-05-20 RX ORDER — SENNA PLUS 8.6 MG/1
2 TABLET ORAL AT BEDTIME
Refills: 0 | Status: DISCONTINUED | OUTPATIENT
Start: 2024-05-20 | End: 2024-06-03

## 2024-05-20 RX ORDER — LIDOCAINE 4 G/100G
1 CREAM TOPICAL ONCE
Refills: 0 | Status: COMPLETED | OUTPATIENT
Start: 2024-05-20 | End: 2024-05-20

## 2024-05-20 RX ADMIN — ERTAPENEM SODIUM 120 MILLIGRAM(S): 1 INJECTION, POWDER, LYOPHILIZED, FOR SOLUTION INTRAMUSCULAR; INTRAVENOUS at 00:23

## 2024-05-20 RX ADMIN — LIDOCAINE 1 PATCH: 4 CREAM TOPICAL at 04:36

## 2024-05-20 RX ADMIN — Medication 10 UNIT(S): at 12:43

## 2024-05-20 RX ADMIN — INSULIN GLARGINE 32 UNIT(S): 100 INJECTION, SOLUTION SUBCUTANEOUS at 01:57

## 2024-05-20 RX ADMIN — LIDOCAINE 1 PATCH: 4 CREAM TOPICAL at 07:50

## 2024-05-20 RX ADMIN — Medication 1: at 18:00

## 2024-05-20 RX ADMIN — CHLORHEXIDINE GLUCONATE 1 APPLICATION(S): 213 SOLUTION TOPICAL at 15:03

## 2024-05-20 RX ADMIN — Medication 20 MILLIGRAM(S): at 06:18

## 2024-05-20 RX ADMIN — Medication 1: at 22:38

## 2024-05-20 RX ADMIN — Medication 25 MILLIGRAM(S): at 06:19

## 2024-05-20 RX ADMIN — Medication 1: at 12:42

## 2024-05-20 RX ADMIN — INSULIN GLARGINE 28 UNIT(S): 100 INJECTION, SOLUTION SUBCUTANEOUS at 22:38

## 2024-05-20 RX ADMIN — Medication 10 UNIT(S): at 08:37

## 2024-05-20 RX ADMIN — Medication 10 UNIT(S): at 18:01

## 2024-05-20 RX ADMIN — MEROPENEM 100 MILLIGRAM(S): 1 INJECTION INTRAVENOUS at 22:39

## 2024-05-20 RX ADMIN — Medication 1: at 08:36

## 2024-05-20 RX ADMIN — Medication 81 MILLIGRAM(S): at 12:41

## 2024-05-20 RX ADMIN — LIDOCAINE 1 PATCH: 4 CREAM TOPICAL at 16:36

## 2024-05-20 NOTE — CONSULT NOTE ADULT - TIME BILLING
Reviewing the chart, interpreting lab data, discussing case with team, interview and examination of patient, and documentation.

## 2024-05-20 NOTE — CONSULT NOTE ADULT - PROBLEM SELECTOR RECOMMENDATION 9
- MRI CTL spine w/o contrast     [id number]    Case discussed with attending neurosurgeon Dr. Ayers - MRI Cervical and Lumbar spine w/o contrast     [id number]    Case discussed with attending neurosurgeon Dr. Ayers

## 2024-05-20 NOTE — CONSULT NOTE ADULT - SUBJECTIVE AND OBJECTIVE BOX
NEUROSURGERY CONSULT    HPI: 73-year-old male with past medical history of IDDM2, spinal stenosis complicated by neurogenic bladder with chronic Salazar, HTN presenting for lower extremity weakness, per pt there is baseline weakness as he uses a cane to walk.  Pt reported that on 5/15/24, he started to struggle with ambulating due to pain going down b/l lateral legs associated with numbness on anterolateral surface of leg. Pt reported taking tylenol and Aleve but did not alleviate symptoms. Pt reports having to use furniture and a walker to ambulate. Denies fevers, chills, chest pain, dysuria, saddle anesthesia or urinary/fecal incontinence. Pt has chronic hx of urinary incontinence in the context of spinal stenosis.     In the ED VS notable for BP of 160/80. In the ED labs  notable for  and UA + for LE, nitrates, and bacteria. Imaging was notable for cervical stenosis and lordosis and thoracolumbar spine spondylosis.  In the ED pt received Zosyn and 0.5L NS.  (19 May 2024 21:20)      RADIOLOGY:   CT Lumbar spine 5/19/24    IMPRESSION:  CT CERVICAL SPINE: No fracture. Reversed lordosis centered at C4   associated with advanced disc degeneration and anterior wedging.    High-grade bilateral C2-C3, C3-C4, C5-C6, and C6-C7 foraminal stenosis,   as well as high-grade C5-C6 central canal stenosis due to advanced disc   and uncovertebral joint degeneration.    CT THORACOLUMBAR SPINE: No fracture. Multilevel spondylosis.    Coronary artery arteriosclerosis. Consider coronary CTA.        MEDS:  acetaminophen     Tablet .. 650 milliGRAM(s) Oral every 6 hours PRN  aspirin  chewable 81 milliGRAM(s) Oral daily  dextrose 10% Bolus 125 milliLiter(s) IV Bolus once  dextrose 5%. 1000 milliLiter(s) IV Continuous <Continuous>  dextrose 5%. 1000 milliLiter(s) IV Continuous <Continuous>  dextrose 50% Injectable 12.5 Gram(s) IV Push once  dextrose 50% Injectable 25 Gram(s) IV Push once  dextrose Oral Gel 15 Gram(s) Oral once PRN  enalapril 20 milliGRAM(s) Oral two times a day  ertapenem  IVPB 1000 milliGRAM(s) IV Intermittent every 24 hours  glucagon  Injectable 1 milliGRAM(s) IntraMuscular once  insulin glargine Injectable (LANTUS) 28 Unit(s) SubCutaneous at bedtime  insulin lispro (ADMELOG) corrective regimen sliding scale   SubCutaneous at bedtime  insulin lispro (ADMELOG) corrective regimen sliding scale   SubCutaneous three times a day before meals  insulin lispro Injectable (ADMELOG) 10 Unit(s) SubCutaneous three times a day before meals  melatonin 3 milliGRAM(s) Oral at bedtime PRN  metoprolol succinate ER 25 milliGRAM(s) Oral daily  senna 2 Tablet(s) Oral at bedtime PRN  traMADol 25 milliGRAM(s) Oral once PRN      Vital Signs Last 24 Hrs  T(C): 36.7 (20 May 2024 13:04), Max: 36.8 (19 May 2024 17:33)  T(F): 98 (20 May 2024 13:04), Max: 98.3 (19 May 2024 20:15)  HR: 65 (20 May 2024 09:00) (65 - 83)  BP: 138/76 (20 May 2024 13:04) (138/76 - 165/90)  BP(mean): --  RR: 18 (20 May 2024 13:04) (16 - 18)  SpO2: 98% (20 May 2024 13:04) (97% - 99%)    Parameters below as of 20 May 2024 09:00  Patient On (Oxygen Delivery Method): room air        LABS:                        13.8   5.94  )-----------( 193      ( 20 May 2024 13:46 )             39.7     05-19    137  |  103  |  30<H>  ----------------------------<  204<H>  4.3   |  21<L>  |  1.26    Ca    9.7      19 May 2024 11:42  Phos  3.0     05-19  Mg     2.00     05-19    TPro  6.9  /  Alb  4.0  /  TBili  0.4  /  DBili  x   /  AST  12  /  ALT  11  /  AlkPhos  37<L>  05-19    PT/INR - ( 19 May 2024 11:42 )   PT: 10.7 sec;   INR: 0.95 ratio         PTT - ( 19 May 2024 11:42 )  PTT:28.6 sec      PHYSICAL EXAM:  AOx3, Following Commands, Face symmetrical  EOMI, PERRL    RUE MOTOR:   Delt 5/5  Bicep 5/5  Tricep 5/5      HG 5/5      LUE MOTOR:   Delt 5/5  Bicep 5/5   Tricep 5/5     HG 5/5     RLE MOTOR:   HF 5/5            KF 4/5  (likely secondary to pain)        KE 4/5  (likely secondary to pain)      DF 5/5         PF 5/5        LLE MOTOR:   HF 5/5        KF 5/5          KE 5/5            DF 5/5            PF 5/5           SENSATION: intact to light touch     REFLEXES:  No clonus  No Hoffmans     NEUROSURGERY CONSULT    HPI: 73-year-old male with past medical history of IDDM2, spinal stenosis complicated by neurogenic bladder with chronic Salazar diagnosed in DR about 3  years ago, HTN presenting for lower extremity weakness, per pt there is baseline weakness as he uses a cane to walk.  Pt reported that on 5/15/24, he started to struggle with ambulating due to pain going down b/l lateral legs associated with numbness on anterolateral surface of leg. Pt reported taking tylenol and Aleve but did not alleviate symptoms. Pt reports having to use furniture and a walker to ambulate. Denies fevers, chills, chest pain, dysuria, saddle anesthesia or urinary/fecal incontinence. Pt has chronic hx of urinary incontinence in the context of spinal stenosis.     In the ED VS notable for BP of 160/80. In the ED labs  notable for  and UA + for LE, nitrates, and bacteria. Imaging was notable for cervical stenosis and lordosis and thoracolumbar spine spondylosis.  In the ED pt received Zosyn and 0.5L NS.  (19 May 2024 21:20)      RADIOLOGY:   CT Lumbar spine 5/19/24    IMPRESSION:  CT CERVICAL SPINE: No fracture. Reversed lordosis centered at C4   associated with advanced disc degeneration and anterior wedging.    High-grade bilateral C2-C3, C3-C4, C5-C6, and C6-C7 foraminal stenosis,   as well as high-grade C5-C6 central canal stenosis due to advanced disc   and uncovertebral joint degeneration.    CT THORACOLUMBAR SPINE: No fracture. Multilevel spondylosis.    Coronary artery arteriosclerosis. Consider coronary CTA.        MEDS:  acetaminophen     Tablet .. 650 milliGRAM(s) Oral every 6 hours PRN  aspirin  chewable 81 milliGRAM(s) Oral daily  dextrose 10% Bolus 125 milliLiter(s) IV Bolus once  dextrose 5%. 1000 milliLiter(s) IV Continuous <Continuous>  dextrose 5%. 1000 milliLiter(s) IV Continuous <Continuous>  dextrose 50% Injectable 12.5 Gram(s) IV Push once  dextrose 50% Injectable 25 Gram(s) IV Push once  dextrose Oral Gel 15 Gram(s) Oral once PRN  enalapril 20 milliGRAM(s) Oral two times a day  ertapenem  IVPB 1000 milliGRAM(s) IV Intermittent every 24 hours  glucagon  Injectable 1 milliGRAM(s) IntraMuscular once  insulin glargine Injectable (LANTUS) 28 Unit(s) SubCutaneous at bedtime  insulin lispro (ADMELOG) corrective regimen sliding scale   SubCutaneous at bedtime  insulin lispro (ADMELOG) corrective regimen sliding scale   SubCutaneous three times a day before meals  insulin lispro Injectable (ADMELOG) 10 Unit(s) SubCutaneous three times a day before meals  melatonin 3 milliGRAM(s) Oral at bedtime PRN  metoprolol succinate ER 25 milliGRAM(s) Oral daily  senna 2 Tablet(s) Oral at bedtime PRN  traMADol 25 milliGRAM(s) Oral once PRN      Vital Signs Last 24 Hrs  T(C): 36.7 (20 May 2024 13:04), Max: 36.8 (19 May 2024 17:33)  T(F): 98 (20 May 2024 13:04), Max: 98.3 (19 May 2024 20:15)  HR: 65 (20 May 2024 09:00) (65 - 83)  BP: 138/76 (20 May 2024 13:04) (138/76 - 165/90)  BP(mean): --  RR: 18 (20 May 2024 13:04) (16 - 18)  SpO2: 98% (20 May 2024 13:04) (97% - 99%)    Parameters below as of 20 May 2024 09:00  Patient On (Oxygen Delivery Method): room air        LABS:                        13.8   5.94  )-----------( 193      ( 20 May 2024 13:46 )             39.7     05-19    137  |  103  |  30<H>  ----------------------------<  204<H>  4.3   |  21<L>  |  1.26    Ca    9.7      19 May 2024 11:42  Phos  3.0     05-19  Mg     2.00     05-19    TPro  6.9  /  Alb  4.0  /  TBili  0.4  /  DBili  x   /  AST  12  /  ALT  11  /  AlkPhos  37<L>  05-19    PT/INR - ( 19 May 2024 11:42 )   PT: 10.7 sec;   INR: 0.95 ratio         PTT - ( 19 May 2024 11:42 )  PTT:28.6 sec      PHYSICAL EXAM:  AOx3, Following Commands, Face symmetrical  EOMI, PERRL    RUE MOTOR:   Delt 5/5  Bicep 5/5  Tricep 5/5      HG 5/5      LUE MOTOR:   Delt 5/5  Bicep 5/5   Tricep 5/5     HG 5/5     RLE MOTOR:   HF 5/5            KF 4/5  (likely secondary to pain)        KE 4/5  (likely secondary to pain)      DF 5/5         PF 5/5        LLE MOTOR:   HF 5/5        KF 5/5          KE 5/5            DF 5/5            PF 5/5           SENSATION: intact to light touch     REFLEXES:  No clonus  No Hoffmans

## 2024-05-20 NOTE — PROGRESS NOTE ADULT - PROBLEM SELECTOR PLAN 6
Diet: Consistent carb  DVT-P: Lovenox pending MR imaging  Dispo: pending medical optimization, patient is requesting copies of imaging/labs prior to discharge    #constipation  senna PRN  avoid Miralax per patient request  Plan discussed with ACP

## 2024-05-20 NOTE — CHART NOTE - NSCHARTNOTEFT_GEN_A_CORE
Per discussion with attending, patient did not complete MRI this AM, since he believed he was ordered for the incorrect test, and sent back to the floor. Patient now willing to undergo MRI as ordered. Attempted to expedite MRI with Tech ([phone]), who told me a repeat attempt would not be possible today, despite the urgent indication for this test. Escalated to MRI Supervisor Alejo Garcia via email. Will follow up with MRI team for further scheduling.     Richar Longo PA-C  Department of Medicine  Pager #[phone]

## 2024-05-20 NOTE — CONSULT NOTE ADULT - SUBJECTIVE AND OBJECTIVE BOX
ID INITIAL CONSULT NOTE     Patient is a 73y old  Male who presents with a chief complaint of BL LE weakness (20 May 2024 14:54)      HPI:  Mr. Mayorga is a 73-year-old male with past medical history of IDDM2, spinal stenosis complicated by neurogenic bladder with chronic Salazar, HTN presenting for lower extremity weakness, per pt there is always component of baseline weakness as pt has used a cane to walk in the past. This last week pt reported struggling to ambulate due to pain going down b/l lateral legs along with numbness on anterolateral surface of leg. Pt reported taking tylenol and Aleve for these symptoms but there was no symptom improvement. Pt reports having to use furniture and a walker to ambulate. Pt denies fevers, chills, chest pain, dysuria, saddle anesthesia or fecal incontinence Pt has chronic hx of urinary incontinence in the context of spinal stenosis. Pt was previous found to be ESBL + in early may (patient reports history of Proteus infection in December 2023 s/p abx with recurrence shortly thereafter).     Salazar was changed on Wednesday in the DR.    In the ED VS notable for BP of 160/80. In the ED labs  notable for  and UA + for LE, nitrates, and bacteria. Imaging was notable for cervical stenosis and lordosis and thoracolumbar spine spondylosis.  In the ED pt received Zosyn and 0.5L NS.  (19 May 2024 21:20)      prior hospital charts reviewed [ X ]  primary team notes reviewed [ X ]  other consultant notes reviewed [  ]    PAST MEDICAL & SURGICAL HISTORY:  Diabetes      Neurogenic bladder      Chronic indwelling Salazar catheter      Hypertension      CAD (coronary artery disease)      History of appendectomy      S/P CABG (coronary artery bypass graft)          Allergies  No Known Allergies        ANTIMICROBIALS:  ertapenem  IVPB 1000 every 24 hours      Current Abx:     Past Abx:       OTHER MEDS: MEDICATIONS  (STANDING):  acetaminophen     Tablet .. 650 every 6 hours PRN  aspirin  chewable 81 daily  dextrose 50% Injectable 12.5 once  dextrose 50% Injectable 25 once  dextrose Oral Gel 15 once PRN  enalapril 20 two times a day  glucagon  Injectable 1 once  insulin glargine Injectable (LANTUS) 28 at bedtime  insulin lispro (ADMELOG) corrective regimen sliding scale  at bedtime  insulin lispro (ADMELOG) corrective regimen sliding scale  three times a day before meals  insulin lispro Injectable (ADMELOG) 10 three times a day before meals  melatonin 3 at bedtime PRN  metoprolol succinate ER 25 daily  senna 2 at bedtime PRN  traMADol 25 once PRN      SOCIAL HISTORY: [ ] etoh [ ] tobacco [ ] former smoker [ ] IVDU  Lives with best friend     FAMILY HISTORY:  Family hx of lung cancer (Father)        REVIEW OF SYSTEMS:    CONSTITUTIONAL: No weakness, fevers or chills  EYES/ENT: No visual changes;  No vertigo or throat pain   NECK: No pain or stiffness  RESPIRATORY: No cough, wheezing, hemoptysis; No shortness of breath  CARDIOVASCULAR: No chest pain or palpitations  GASTROINTESTINAL: No abdominal or epigastric pain. No nausea, vomiting, or hematemesis; No diarrhea or constipation. No melena or hematochezia.  GENITOURINARY: No dysuria, frequency or hematuria  NEUROLOGICAL: B/l LE weakness, numbness,  SKIN: No itching, burning, rashes, or lesions   All other review of systems is negative unless indicated above.    Vital Signs Last 24 Hrs  T(F): 98 (05-20-24 @ 13:04), Max: 98.3 (05-19-24 @ 20:15)    Vital Signs Last 24 Hrs  HR: 65 (05-20-24 @ 09:00) (65 - 83)  BP: 138/76 (05-20-24 @ 13:04) (138/76 - 165/90)  RR: 18 (05-20-24 @ 13:04)  SpO2: 98% (05-20-24 @ 13:04) (97% - 99%)  Wt(kg): --    PHYSICAL EXAM:  GENERAL: NAD, well-developed  HEAD:  Atraumatic, Normocephalic  EYES: EOMI, PERRLA, conjunctiva and sclera clear  NECK: Supple, No JVD  CHEST/LUNG: Clear to auscultation bilaterally; No wheeze  HEART: Regular rate and rhythm; No murmurs, rubs, or gallops  ABDOMEN: Soft, Nontender, Nondistended; Bowel sounds present. No CVA tenderness   EXTREMITIES:  2+ Peripheral Pulses, No clubbing, cyanosis, or edema  PSYCH: AAOx3        Labs:                          13.8   5.94  )-----------( 193      ( 20 May 2024 13:46 )             39.7       05-20    136  |  104  |  25<H>  ----------------------------<  209<H>  4.7   |  20<L>  |  1.19    Ca    9.7      20 May 2024 13:46  Phos  3.8     05-20  Mg     2.00     05-20    TPro  6.6  /  Alb  3.9  /  TBili  0.4  /  DBili  x   /  AST  10  /  ALT  13  /  AlkPhos  34<L>  05-20      Urinalysis Basic - ( 20 May 2024 13:46 )    Color: x / Appearance: x / SG: x / pH: x  Gluc: 209 mg/dL / Ketone: x  / Bili: x / Urobili: x   Blood: x / Protein: x / Nitrite: x   Leuk Esterase: x / RBC: x / WBC x   Sq Epi: x / Non Sq Epi: x / Bacteria: x          MICROBIOLOGY:  Clean Catch Clean Catch (Midstream)  04-27-24   >100,000 CFU/ml Proteus mirabilis ESBL  >100,000 CFU/ml Enterococcus faecalis  --  Proteus mirabilis ESBL  Enterococcus faecalis      .Blood Blood-Peripheral  04-27-24   No growth at 5 days  --  --      .Blood Blood-Peripheral  04-27-24   No growth at 5 days  --  --      .Blood Blood-Peripheral  04-25-24   Growth in anaerobic bottle: Staphylococcus hominis  Isolation of Coagulase negative Staphylococcus from single blood culture  sets may represent  contamination. Contact the Microbiology Department at [phone] if  susceptibility testing is  clinically indicated.  Direct identification is available within approximately 3-5  hours either by Blood Panel Multiplexed PCR or Direct  MALDI-TOF. Details: [email or web address]  --  Blood Culture PCR      Clean Catch Clean Catch (Midstream)  04-25-24   >=3 organisms. Probable collection contamination.  --  --              v              RADIOLOGY:  < from: CT Thoracic Spine No Cont (05.19.24 @ 14:09) >  IMPRESSION:    CT CERVICAL SPINE: No fracture. Reversed lordosis centered at C4   associated with advanced disc degeneration and anterior wedging.    High-grade bilateral C2-C3, C3-C4, C5-C6, and C6-C7 foraminal stenosis,   as well as high-grade C5-C6 central canal stenosis due to advanced disc   and uncovertebral joint degeneration.    CT THORACOLUMBAR SPINE: No fracture. Multilevel spondylosis.    Coronary artery arteriosclerosis. Consider coronary CTA.    < end of copied text >

## 2024-05-20 NOTE — PROGRESS NOTE ADULT - PROBLEM SELECTOR PLAN 1
LE weakness with new pain and anterolateral numbness L>R  -f/u Heavy metal testing,  rpr, B1, B6,  -b12 elevated  -HIV neg, TSH wnl, ck wnl  -MRI evaluation for cord compression given worsening LE weakness with new anterolateral b/l numbness.  -rectal tone intact on ED exam, patient denies fecal incontinence or saddle anesthesia  fall precautions  PT consult  - acetaminophen PRN, ordered tramadol 25mg x1 PRN severe pain (not standing as patient reports it has caused drop in his BP in the past however notes BP has been increased of recent, also due to report of chronic constipation), monitor BP and maintain hold parameters for BP meds.  reports having taken Myoflex gel, chlorzoxazone and Tramadol in the past for pain (iSTOP Reference #: [id number], negative, however patient receives some care in DR)  -Neurosx consulted, f/u consult recs  f/u cxr

## 2024-05-20 NOTE — PROGRESS NOTE ADULT - SUBJECTIVE AND OBJECTIVE BOX
Patient is a 73y old  Male who presents with a chief complaint of BL LE weakness (20 May 2024 14:12)      SUBJECTIVE / OVERNIGHT EVENTS: Pt seen and examined at 11:22am, no overnight events, pt reports pain in both legs R>L, also with numbness of his legs and rt buttock pain and gait instability went for MRI but sent back earlier today as pt told that he was told by outsider provider that he needs lumbosacral plexus MRI due to hernia? pt now is agreeable for MRI, explained that he needs MRI to r/o cord compression, denies any saddle anesthesia, bowel incontinence, has chronic hernandez, no other complaints, no other new issues reported.    MEDICATIONS  (STANDING):  aspirin  chewable 81 milliGRAM(s) Oral daily  chlorhexidine 2% Cloths 1 Application(s) Topical daily  dextrose 10% Bolus 125 milliLiter(s) IV Bolus once  dextrose 5%. 1000 milliLiter(s) (100 mL/Hr) IV Continuous <Continuous>  dextrose 5%. 1000 milliLiter(s) (50 mL/Hr) IV Continuous <Continuous>  dextrose 50% Injectable 25 Gram(s) IV Push once  dextrose 50% Injectable 12.5 Gram(s) IV Push once  enalapril 20 milliGRAM(s) Oral two times a day  ertapenem  IVPB 1000 milliGRAM(s) IV Intermittent every 24 hours  glucagon  Injectable 1 milliGRAM(s) IntraMuscular once  insulin glargine Injectable (LANTUS) 28 Unit(s) SubCutaneous at bedtime  insulin lispro (ADMELOG) corrective regimen sliding scale   SubCutaneous at bedtime  insulin lispro (ADMELOG) corrective regimen sliding scale   SubCutaneous three times a day before meals  insulin lispro Injectable (ADMELOG) 10 Unit(s) SubCutaneous three times a day before meals  metoprolol succinate ER 25 milliGRAM(s) Oral daily    MEDICATIONS  (PRN):  acetaminophen     Tablet .. 650 milliGRAM(s) Oral every 6 hours PRN Temp greater or equal to 38C (100.4F), Mild Pain (1 - 3)  dextrose Oral Gel 15 Gram(s) Oral once PRN Blood Glucose LESS THAN 70 milliGRAM(s)/deciliter  melatonin 3 milliGRAM(s) Oral at bedtime PRN Insomnia  senna 2 Tablet(s) Oral at bedtime PRN Constipation  traMADol 25 milliGRAM(s) Oral once PRN Severe Pain (7 - 10)      Vital Signs Last 24 Hrs  T(C): 36.7 (20 May 2024 13:04), Max: 36.8 (19 May 2024 17:33)  T(F): 98 (20 May 2024 13:04), Max: 98.3 (19 May 2024 20:15)  HR: 65 (20 May 2024 09:00) (65 - 83)  BP: 138/76 (20 May 2024 13:04) (138/76 - 165/90)  BP(mean): --  RR: 18 (20 May 2024 13:04) (18 - 18)  SpO2: 98% (20 May 2024 13:04) (97% - 99%)    Parameters below as of 20 May 2024 09:00  Patient On (Oxygen Delivery Method): room air      CAPILLARY BLOOD GLUCOSE      POCT Blood Glucose.: 186 mg/dL (20 May 2024 11:56)  POCT Blood Glucose.: 188 mg/dL (20 May 2024 08:23)  POCT Blood Glucose.: 313 mg/dL (20 May 2024 01:56)  POCT Blood Glucose.: 297 mg/dL (19 May 2024 22:07)  POCT Blood Glucose.: 267 mg/dL (19 May 2024 19:43)    I&O's Summary    19 May 2024 07:01  -  20 May 2024 07:00  --------------------------------------------------------  IN: 0 mL / OUT: 1100 mL / NET: -1100 mL        PHYSICAL EXAM:  GENERAL: NAD  CHEST/LUNG: Clear to auscultation bilaterally; No wheeze  HEART: Regular rate and rhythm  ABDOMEN: Soft, Nontender, Nondistended  EXTREMITIES: no LE edema  :+hernandez  PSYCH: calm  NEUROLOGY: AAOx3, 4/5 LE  SKIN: No rashes or lesions    LABS:                        13.8   5.94  )-----------( 193      ( 20 May 2024 13:46 )             39.7     05-20    136  |  104  |  25<H>  ----------------------------<  209<H>  4.7   |  20<L>  |  1.19    Ca    9.7      20 May 2024 13:46  Phos  3.8     05-20  Mg     2.00     05-20    TPro  6.6  /  Alb  3.9  /  TBili  0.4  /  DBili  x   /  AST  10  /  ALT  13  /  AlkPhos  34<L>  05-20    PT/INR - ( 19 May 2024 11:42 )   PT: 10.7 sec;   INR: 0.95 ratio         PTT - ( 19 May 2024 11:42 )  PTT:28.6 sec  CARDIAC MARKERS ( 20 May 2024 06:40 )  x     / x     / 79 U/L / x     / x          Urinalysis Basic - ( 20 May 2024 13:46 )    Color: x / Appearance: x / SG: x / pH: x  Gluc: 209 mg/dL / Ketone: x  / Bili: x / Urobili: x   Blood: x / Protein: x / Nitrite: x   Leuk Esterase: x / RBC: x / WBC x   Sq Epi: x / Non Sq Epi: x / Bacteria: x        RADIOLOGY & ADDITIONAL TESTS:    Imaging Personally Reviewed:    Consultant(s) Notes Reviewed:      Care Discussed with Consultants/Other Providers:

## 2024-05-21 LAB
ALBUMIN SERPL ELPH-MCNC: 3.9 G/DL — SIGNIFICANT CHANGE UP (ref 3.3–5)
ALP SERPL-CCNC: 38 U/L — LOW (ref 40–120)
ALT FLD-CCNC: 10 U/L — SIGNIFICANT CHANGE UP (ref 4–41)
ANION GAP SERPL CALC-SCNC: 14 MMOL/L — SIGNIFICANT CHANGE UP (ref 7–14)
AST SERPL-CCNC: 10 U/L — SIGNIFICANT CHANGE UP (ref 4–40)
BASOPHILS # BLD AUTO: 0.05 K/UL — SIGNIFICANT CHANGE UP (ref 0–0.2)
BASOPHILS NFR BLD AUTO: 0.8 % — SIGNIFICANT CHANGE UP (ref 0–2)
BILIRUB SERPL-MCNC: 0.3 MG/DL — SIGNIFICANT CHANGE UP (ref 0.2–1.2)
BUN SERPL-MCNC: 27 MG/DL — HIGH (ref 7–23)
CALCIUM SERPL-MCNC: 10 MG/DL — SIGNIFICANT CHANGE UP (ref 8.4–10.5)
CHLORIDE SERPL-SCNC: 103 MMOL/L — SIGNIFICANT CHANGE UP (ref 98–107)
CO2 SERPL-SCNC: 20 MMOL/L — LOW (ref 22–31)
CREAT SERPL-MCNC: 1.18 MG/DL — SIGNIFICANT CHANGE UP (ref 0.5–1.3)
EGFR: 66 ML/MIN/1.73M2 — SIGNIFICANT CHANGE UP
EOSINOPHIL # BLD AUTO: 0.17 K/UL — SIGNIFICANT CHANGE UP (ref 0–0.5)
EOSINOPHIL NFR BLD AUTO: 2.7 % — SIGNIFICANT CHANGE UP (ref 0–6)
GLUCOSE SERPL-MCNC: 191 MG/DL — HIGH (ref 70–99)
HCT VFR BLD CALC: 40.6 % — SIGNIFICANT CHANGE UP (ref 39–50)
HGB BLD-MCNC: 14.6 G/DL — SIGNIFICANT CHANGE UP (ref 13–17)
IANC: 3.55 K/UL — SIGNIFICANT CHANGE UP (ref 1.8–7.4)
IMM GRANULOCYTES NFR BLD AUTO: 0.5 % — SIGNIFICANT CHANGE UP (ref 0–0.9)
LEAD BLD-MCNC: <1 UG/DL — SIGNIFICANT CHANGE UP (ref 0–3.4)
LYMPHOCYTES # BLD AUTO: 1.86 K/UL — SIGNIFICANT CHANGE UP (ref 1–3.3)
LYMPHOCYTES # BLD AUTO: 29.2 % — SIGNIFICANT CHANGE UP (ref 13–44)
MAGNESIUM SERPL-MCNC: 2.1 MG/DL — SIGNIFICANT CHANGE UP (ref 1.6–2.6)
MCHC RBC-ENTMCNC: 31.2 PG — SIGNIFICANT CHANGE UP (ref 27–34)
MCHC RBC-ENTMCNC: 36 GM/DL — SIGNIFICANT CHANGE UP (ref 32–36)
MCV RBC AUTO: 86.8 FL — SIGNIFICANT CHANGE UP (ref 80–100)
MONOCYTES # BLD AUTO: 0.71 K/UL — SIGNIFICANT CHANGE UP (ref 0–0.9)
MONOCYTES NFR BLD AUTO: 11.1 % — SIGNIFICANT CHANGE UP (ref 2–14)
NEUTROPHILS # BLD AUTO: 3.55 K/UL — SIGNIFICANT CHANGE UP (ref 1.8–7.4)
NEUTROPHILS NFR BLD AUTO: 55.7 % — SIGNIFICANT CHANGE UP (ref 43–77)
NRBC # BLD: 0 /100 WBCS — SIGNIFICANT CHANGE UP (ref 0–0)
NRBC # FLD: 0 K/UL — SIGNIFICANT CHANGE UP (ref 0–0)
PLATELET # BLD AUTO: 192 K/UL — SIGNIFICANT CHANGE UP (ref 150–400)
POTASSIUM SERPL-MCNC: 4.1 MMOL/L — SIGNIFICANT CHANGE UP (ref 3.5–5.3)
POTASSIUM SERPL-SCNC: 4.1 MMOL/L — SIGNIFICANT CHANGE UP (ref 3.5–5.3)
PROT SERPL-MCNC: 6.7 G/DL — SIGNIFICANT CHANGE UP (ref 6–8.3)
RBC # BLD: 4.68 M/UL — SIGNIFICANT CHANGE UP (ref 4.2–5.8)
RBC # FLD: 13.2 % — SIGNIFICANT CHANGE UP (ref 10.3–14.5)
SODIUM SERPL-SCNC: 137 MMOL/L — SIGNIFICANT CHANGE UP (ref 135–145)
T PALLIDUM AB TITR SER: NEGATIVE — SIGNIFICANT CHANGE UP
WBC # BLD: 6.37 K/UL — SIGNIFICANT CHANGE UP (ref 3.8–10.5)
WBC # FLD AUTO: 6.37 K/UL — SIGNIFICANT CHANGE UP (ref 3.8–10.5)

## 2024-05-21 PROCEDURE — 99232 SBSQ HOSP IP/OBS MODERATE 35: CPT

## 2024-05-21 PROCEDURE — 72052 X-RAY EXAM NECK SPINE 6/>VWS: CPT | Mod: 26

## 2024-05-21 PROCEDURE — 99222 1ST HOSP IP/OBS MODERATE 55: CPT

## 2024-05-21 PROCEDURE — 99233 SBSQ HOSP IP/OBS HIGH 50: CPT

## 2024-05-21 RX ORDER — LIDOCAINE 4 G/100G
1 CREAM TOPICAL EVERY 24 HOURS
Refills: 0 | Status: DISCONTINUED | OUTPATIENT
Start: 2024-05-21 | End: 2024-06-04

## 2024-05-21 RX ADMIN — INSULIN GLARGINE 28 UNIT(S): 100 INJECTION, SOLUTION SUBCUTANEOUS at 22:21

## 2024-05-21 RX ADMIN — Medication 650 MILLIGRAM(S): at 08:37

## 2024-05-21 RX ADMIN — Medication 10 UNIT(S): at 08:36

## 2024-05-21 RX ADMIN — MEROPENEM 100 MILLIGRAM(S): 1 INJECTION INTRAVENOUS at 13:23

## 2024-05-21 RX ADMIN — Medication 1: at 08:36

## 2024-05-21 RX ADMIN — Medication 10 UNIT(S): at 18:09

## 2024-05-21 RX ADMIN — Medication 1: at 18:10

## 2024-05-21 RX ADMIN — Medication 20 MILLIGRAM(S): at 18:10

## 2024-05-21 RX ADMIN — Medication 3: at 12:40

## 2024-05-21 RX ADMIN — MEROPENEM 100 MILLIGRAM(S): 1 INJECTION INTRAVENOUS at 21:21

## 2024-05-21 RX ADMIN — Medication 81 MILLIGRAM(S): at 12:43

## 2024-05-21 RX ADMIN — LIDOCAINE 1 PATCH: 4 CREAM TOPICAL at 19:03

## 2024-05-21 RX ADMIN — Medication 10 UNIT(S): at 12:38

## 2024-05-21 RX ADMIN — MEROPENEM 100 MILLIGRAM(S): 1 INJECTION INTRAVENOUS at 06:17

## 2024-05-21 RX ADMIN — Medication 650 MILLIGRAM(S): at 09:30

## 2024-05-21 RX ADMIN — Medication 25 MILLIGRAM(S): at 06:17

## 2024-05-21 RX ADMIN — CHLORHEXIDINE GLUCONATE 1 APPLICATION(S): 213 SOLUTION TOPICAL at 12:48

## 2024-05-21 RX ADMIN — LIDOCAINE 1 PATCH: 4 CREAM TOPICAL at 12:50

## 2024-05-21 NOTE — PROGRESS NOTE ADULT - PROBLEM SELECTOR PLAN 1
LE weakness with new pain and anterolateral numbness L>R  -lead neg,  rpr neg,   -f/u B1, B6,  -b12 elevated  -HIV neg, TSH wnl, ck wnl  -MRI results reviewed, f/u with nsx  -rectal tone intact on ED exam, patient denies fecal incontinence or saddle anesthesia  fall precautions  PT consulted rec PMR consult  - acetaminophen PRN, lidocaine patch daily  reports having taken Myoflex gel, chlorzoxazone and Tramadol in the past for pain (iSTOP Reference #: [id number], negative, however patient receives some care in DR)  -Neurosx consult appreciated  - cxr neg

## 2024-05-21 NOTE — CONSULT NOTE ADULT - SUBJECTIVE AND OBJECTIVE BOX
HPI:  Mr. Mayorga is a 73-year-old male with past medical history of IDDM2, spinal stenosis complicated by neurogenic bladder with chronic Hernandez, HTN presenting for lower extremity weakness, per pt there is always component of baseline weakness as pt has used a cane to walk in the past. This last week pt reported struggling to ambulate due to pain going down b/l lateral legs along with numbness on anterolateral surface of leg. Pt reported taking tylenol and Aleve for these symptoms but there was no symptom improvement. Pt reports having to use furniture and a walker to ambulate. Pt denies fevers, chills, chest pain, dysuria, saddle anesthesia or fecal incontinence Pt has chronic hx of urinary incontinence in the context of spinal stenosis. Pt was previous found to be ESBL + in early may (patient reports history of Proteus infection in December 2023 s/p abx with recurrence shortly thereafter).     Hernandez was changed on Wednesday in the DR.    In the ED VS notable for BP of 160/80. In the ED labs  notable for  and UA + for LE, nitrates, and bacteria. Imaging was notable for cervical stenosis and lordosis and thoracolumbar spine spondylosis.  In the ED pt received Zosyn and 0.5L NS.  (19 May 2024 21:20)    Patient was admitted on 5/19, seen today, reports pain in back is improved since admission, has numbness in hands. Difficulty walking, loss of balance x weeks, has chronic hernandez, uses miralax or suppository for chronic constipation at home, recent neck pain/stiffness.     REVIEW OF SYSTEMS  Denies chest pain, SOB, N/V, F/C, abdominal pain     VITALS  T(C): 36.7 (05-21-24 @ 10:45), Max: 36.8 (05-20-24 @ 18:00)  HR: 77 (05-21-24 @ 10:45) (68 - 77)  BP: 159/90 (05-21-24 @ 10:45) (117/71 - 159/90)  RR: 18 (05-21-24 @ 10:45) (17 - 18)  SpO2: 98% (05-21-24 @ 10:45) (94% - 98%)  Wt(kg): --    PAST MEDICAL & SURGICAL HISTORY  Diabetes    Neurogenic bladder    Chronic indwelling Hernandez catheter    Hypertension    CAD (coronary artery disease)    History of appendectomy    S/P CABG (coronary artery bypass graft)        FUNCTIONAL HISTORY  Lives with friend/family flight to enter, to bedroom   Independent ADLs, used cane prior to admission    CURRENT FUNCTIONAL STATUS  PT  5/20  bed mobility CG/min assist   transfers min assist with tri cane   gait min assist with Tri cane x 15 feet   impaired balance     RECENT LABS/IMAGING  CBC Full  -  ( 21 May 2024 07:30 )  WBC Count : 6.37 K/uL  RBC Count : 4.68 M/uL  Hemoglobin : 14.6 g/dL  Hematocrit : 40.6 %  Platelet Count - Automated : 192 K/uL  Mean Cell Volume : 86.8 fL  Mean Cell Hemoglobin : 31.2 pg  Mean Cell Hemoglobin Concentration : 36.0 gm/dL  Auto Neutrophil # : 3.55 K/uL  Auto Lymphocyte # : 1.86 K/uL  Auto Monocyte # : 0.71 K/uL  Auto Eosinophil # : 0.17 K/uL  Auto Basophil # : 0.05 K/uL  Auto Neutrophil % : 55.7 %  Auto Lymphocyte % : 29.2 %  Auto Monocyte % : 11.1 %  Auto Eosinophil % : 2.7 %  Auto Basophil % : 0.8 %    05-21    137  |  103  |  27<H>  ----------------------------<  191<H>  4.1   |  20<L>  |  1.18    Ca    10.0      21 May 2024 07:30  Phos  3.8     05-20  Mg     2.10     05-21    TPro  6.7  /  Alb  3.9  /  TBili  0.3  /  DBili  x   /  AST  10  /  ALT  10  /  AlkPhos  38<L>  05-21    Urinalysis Basic - ( 21 May 2024 07:30 )    Color: x / Appearance: x / SG: x / pH: x  Gluc: 191 mg/dL / Ketone: x  / Bili: x / Urobili: x   Blood: x / Protein: x / Nitrite: x   Leuk Esterase: x / RBC: x / WBC x   Sq Epi: x / Non Sq Epi: x / Bacteria: x    < from: CT Thoracic Spine No Cont (05.19.24 @ 14:09) >    IMPRESSION:    CT CERVICAL SPINE: No fracture. Reversed lordosis centered at C4   associated with advanced disc degeneration and anterior wedging.    High-grade bilateral C2-C3, C3-C4, C5-C6, and C6-C7 foraminal stenosis,   as well as high-grade C5-C6 central canal stenosis due to advanced disc   and uncovertebral joint degeneration.    CT THORACOLUMBAR SPINE: No fracture. Multilevel spondylosis.    Coronary artery arteriosclerosis. Consider coronary CTA.    < end of copied text >    < from: MR Acute Spinal Cord Compression (05.20.24 @ 20:28) >    IMPRESSION:    Multifocal cord impingement from C2/C3 through C5/C6 secondary to   multilevel posterior disc osteophyte complexes and facet arthropathy. No   significant degenerative change of the thoracic spine within the   limitations of sagittal imaging. Moderate narrowing of the bilateral   neural foramen at L4/L5 and severe narrowing of the bilateral neural   foramen atL5/S1, within the limitations of sagittal imaging.    At C2/C3, there is a posterior disc osteophyte complex asymmetric to the   left with a left paracentral disc protrusion resulting in mild to   moderate impingement on the left ventral cord. No abnormal signal within   the cord. There is mild to moderate narrowing of the bilateral neural   foramen and mild central spinal canal stenosis.    At C3/C4, there is a posterior disc osteophyte complex resulting in   moderate narrowing of the bilateral neural foramen, mild impingement on   the ventral cord at the midline without abnormal signal within the   cervical cord. There is mild central spinal canal stenosis.    At C4/C5, there is a posterior disc osteophyte complex and bilateral   facet arthropathy resulting in moderate compression on the cord and   moderate central spinal canal stenosis. There is suspicion of T2   hyperintense signal within the cord at this level on the axial images,   suggestive of mild myelomalacia. There is moderate to severe narrowing of   the bilateral neural foramen.    At C5/C6, there is a large posterior disc osteophyte complex and   bilateral facet arthropathy resulting in moderate ventral cord   compression and moderate to severe central spinal canal stenosis. T2   prolongation within the cord is suspected on the axial imaging indicative   of myelomalacia. There is moderate to severe bilateral foraminal stenosis.    At C6/C7, there is a posterior disc osteophyte complex resulting in mild   central spinal canal stenosis and severe bilateral foraminal stenosis.      < end of copied text >      ALLERGIES  No Known Allergies      MEDICATIONS   acetaminophen     Tablet .. 650 milliGRAM(s) Oral every 6 hours PRN  aspirin  chewable 81 milliGRAM(s) Oral daily  chlorhexidine 2% Cloths 1 Application(s) Topical daily  dextrose 10% Bolus 125 milliLiter(s) IV Bolus once  dextrose 5%. 1000 milliLiter(s) IV Continuous <Continuous>  dextrose 5%. 1000 milliLiter(s) IV Continuous <Continuous>  dextrose 50% Injectable 12.5 Gram(s) IV Push once  dextrose 50% Injectable 25 Gram(s) IV Push once  dextrose Oral Gel 15 Gram(s) Oral once PRN  enalapril 20 milliGRAM(s) Oral two times a day  glucagon  Injectable 1 milliGRAM(s) IntraMuscular once  insulin glargine Injectable (LANTUS) 28 Unit(s) SubCutaneous at bedtime  insulin lispro (ADMELOG) corrective regimen sliding scale   SubCutaneous at bedtime  insulin lispro (ADMELOG) corrective regimen sliding scale   SubCutaneous three times a day before meals  insulin lispro Injectable (ADMELOG) 10 Unit(s) SubCutaneous three times a day before meals  lidocaine   4% Patch 1 Patch Transdermal every 24 hours  melatonin 3 milliGRAM(s) Oral at bedtime PRN  meropenem  IVPB 1000 milliGRAM(s) IV Intermittent every 8 hours  metoprolol succinate ER 25 milliGRAM(s) Oral daily  senna 2 Tablet(s) Oral at bedtime PRN  traMADol 25 milliGRAM(s) Oral once PRN      ----------------------------------------------------------------------------------------  PHYSICAL EXAM  Constitutional - NAD, Comfortable, in bed   Chest - Breathing comfortably  Cardiovascular - S1S2   Extremities - No C/C/E, No calf tenderness   Neurologic Exam -    follows commands                 Cognitive - Awake, Alert, AAO to self, place, date, year, situation     Communication - Fluent, No dysarthria        Motor - moves all ext      Sensory - Intact to LT     Psychiatric - Mood stable, Affect WNL  ----------------------------------------------------------------------------------------  ASSESSMENT/PLAN  73yMale h/o DM, spinal stenosis with neurogenic bladder/chronic hernandez with functional deficits due to LE weakness, cervical stenosis   MRI with cord impingement   on short course antibiotics/meropenem, ID following   bowel regimen  Pain - Tylenol, lidocaine, tramadol prn   DVT PPX - SCDs  Rehab -    plan for surgery as per patient   will need post op PT/OT evaluations   would benefit from inpatient rehabilitation to return to prior level of function/independence    Will continue to follow for ongoing rehab needs and recommendations.

## 2024-05-21 NOTE — PROGRESS NOTE ADULT - SUBJECTIVE AND OBJECTIVE BOX
Infectious Diseases Follow Up:    Patient is a 73y old  Male who presents with a chief complaint of BL LE weakness (20 May 2024 16:09)      Interval History/ROS:  No acute events, pt w/o new complaints     Allergies  No Known Allergies        ANTIMICROBIALS:  meropenem  IVPB 1000 every 8 hours      Current Abx:     Previous Abx     OTHER MEDS:  MEDICATIONS  (STANDING):  acetaminophen     Tablet .. 650 every 6 hours PRN  aspirin  chewable 81 daily  dextrose 50% Injectable 25 once  dextrose 50% Injectable 12.5 once  dextrose Oral Gel 15 once PRN  enalapril 20 two times a day  glucagon  Injectable 1 once  insulin glargine Injectable (LANTUS) 28 at bedtime  insulin lispro (ADMELOG) corrective regimen sliding scale  at bedtime  insulin lispro (ADMELOG) corrective regimen sliding scale  three times a day before meals  insulin lispro Injectable (ADMELOG) 10 three times a day before meals  melatonin 3 at bedtime PRN  metoprolol succinate ER 25 daily  senna 2 at bedtime PRN  traMADol 25 once PRN      Vital Signs Last 24 Hrs  T(C): 36.6 (21 May 2024 05:15), Max: 36.8 (20 May 2024 18:00)  T(F): 97.8 (21 May 2024 05:15), Max: 98.3 (20 May 2024 18:00)  HR: 75 (21 May 2024 05:15) (68 - 75)  BP: 148/80 (21 May 2024 05:15) (117/71 - 149/81)  BP(mean): --  RR: 18 (21 May 2024 05:15) (17 - 18)  SpO2: 98% (21 May 2024 05:15) (94% - 98%)    Parameters below as of 21 May 2024 05:15  Patient On (Oxygen Delivery Method): room air        PHYSICAL EXAM:  GENERAL: NAD, well-developed  HEAD:  Atraumatic, Normocephalic  EYES: EOMI, conjunctiva and sclera clear  CHEST/LUNG: On RA, not in respiratory distress  PSYCH: AAOx3                          14.6   6.37  )-----------( 192      ( 21 May 2024 07:30 )             40.6       05-21    137  |  103  |  27<H>  ----------------------------<  191<H>  4.1   |  20<L>  |  1.18    Ca    10.0      21 May 2024 07:30  Phos  3.8     05-20  Mg     2.10     05-21    TPro  6.7  /  Alb  3.9  /  TBili  0.3  /  DBili  x   /  AST  10  /  ALT  10  /  AlkPhos  38<L>  05-21      Urinalysis Basic - ( 21 May 2024 07:30 )    Color: x / Appearance: x / SG: x / pH: x  Gluc: 191 mg/dL / Ketone: x  / Bili: x / Urobili: x   Blood: x / Protein: x / Nitrite: x   Leuk Esterase: x / RBC: x / WBC x   Sq Epi: x / Non Sq Epi: x / Bacteria: x        MICROBIOLOGY:  v  Catheterized Catheterized  05-19-24   >100,000 CFU/ml Gram Positive Cocci in Pairs and Chains  <10,000 CFU/ml Normal Urogenital santos present  --  --      .Blood Blood-Peripheral  05-19-24   No growth at 24 hours  --  --      .Blood Blood-Peripheral  05-19-24   No growth at 24 hours  --  --      Clean Catch Clean Catch (Midstream)  04-27-24   >100,000 CFU/ml Proteus mirabilis ESBL  >100,000 CFU/ml Enterococcus faecalis  --  Proteus mirabilis ESBL  Enterococcus faecalis      .Blood Blood-Peripheral  04-27-24   No growth at 5 days  --  --      .Blood Blood-Peripheral  04-27-24   No growth at 5 days  --  --      .Blood Blood-Peripheral  04-25-24   Growth in anaerobic bottle: Staphylococcus hominis  Isolation of Coagulase negative Staphylococcus from single blood culture  sets may represent  contamination. Contact the Microbiology Department at [phone] if  susceptibility testing is  clinically indicated.  Direct identification is available within approximately 3-5  hours either by Blood Panel Multiplexed PCR or Direct  MALDI-TOF. Details: [email or web address]  --  Blood Culture PCR      Clean Catch Clean Catch (Midstream)  04-25-24   >=3 organisms. Probable collection contamination.  --  --                RADIOLOGY:  < from: CT Thoracic Spine No Cont (05.19.24 @ 14:09) >  CT CERVICAL SPINE: No fracture. Reversed lordosis centered at C4   associated with advanced disc degeneration and anterior wedging.    High-grade bilateral C2-C3, C3-C4, C5-C6, and C6-C7 foraminal stenosis,   as well as high-grade C5-C6 central canal stenosis due to advanced disc   and uncovertebral joint degeneration.    CT THORACOLUMBAR SPINE: No fracture. Multilevel spondylosis.    Coronary artery arteriosclerosis. Consider coronary CTA.

## 2024-05-21 NOTE — PROVIDER CONTACT NOTE (MEDICATION) - ASSESSMENT
Pt reports pain level of 1 but does not want to take anymore PO meds and requests a lidocaine patch.

## 2024-05-21 NOTE — PROGRESS NOTE ADULT - ASSESSMENT
This is a 73 w/ PMHx of IDDM2, spinal stenosis c/b neurogenic bladder w/ chronic hernandez, HTN who is presenting to [facility] on 5/19 for lowe extremity weakness.   Denies fevers, chills, dysuria.   In the ER, pt was afebrile, VSS.   Labs without leukocytosis, lactate normal, U/A with 51 WBCx   CT C/T/L spine w/o acute findings, R perinephric stranding noted.     Pt recently presented to the ED on 4/25 for stones in hernandez, U/A was positive, BCx w/ CONS. Pt was discharged on Cefdinir, then called back on 4/27 for the positive BCx. Declined changing hernandez.   U/A on 4/25 w/ 155 WBCs, 3+ species in UCx.   U/A on 4/27 w/ 17 WBCx, UCx ESBL proteus and E faecalis.   Pt was called back after being discharged on 4/27, but declined coming back.     #C/f UTI  #ESBL proteus   #B/l LE weakness     Overall,  73 w/ PMHx of IDDM2, spinal stenosis c/b neurogenic bladder w/ chronic hernandez (last exchanged 5/15) , HTN p/w LE weakness. CT C/T/L spine with acute findings, CT A/P with R perinephric stranding. Pt with known h/o bladder stones and recurrent Proteus infection  Pt afebrile, no leukocytosis, no N/V/D. On exam well appearing, no CVA tenderness, some suprapubic discomfort on deep palpation.  No clear signs of infection, unknown if CT finding of peripheric stranding is consistent with clinical picture.    Plan:   1. Change to Meropenem 1 g q8   2. Would favor a short course of antibiotics (3-5 days?) as there is no clear infection seen on interview, exam or labs. Unknown if CT findings represent pyelonephritis in the absence of any symptoms   3. F/u UCx   4. Outpatient urology f/u for bladder stones    Thank you for this consult. Inpatient ID team will follow.    Samy Mccullough M.D.  Attending Physician  Division of Infectious Diseases  Department of Medicine    Please contact through MS Teams message.  Office: [phone] (after 5 PM or weekend)

## 2024-05-21 NOTE — PROGRESS NOTE ADULT - PROBLEM SELECTOR PLAN 6
Diet: Consistent carb  DVT-P: Lovenox pending for now will restart once nsx recs is obtained  Dispo: pending medical optimization, patient is requesting copies of imaging/labs prior to discharge    #constipation  senna PRN  avoid Miralax per patient request  Plan discussed with ACP

## 2024-05-21 NOTE — PROGRESS NOTE ADULT - SUBJECTIVE AND OBJECTIVE BOX
Patient is a 73y old  Male who presents with a chief complaint of BL LE weakness (21 May 2024 10:37)      SUBJECTIVE / OVERNIGHT EVENTS: Pt seen and examined at 11:20am, no overnight events, pt reports pain in both legs R>L, also with numbness of his legs and rt buttock pain and gait instability, had MRI, was told by nsx that he may require sx, no other new issues reported.    MEDICATIONS  (STANDING):  aspirin  chewable 81 milliGRAM(s) Oral daily  chlorhexidine 2% Cloths 1 Application(s) Topical daily  dextrose 10% Bolus 125 milliLiter(s) IV Bolus once  dextrose 5%. 1000 milliLiter(s) (100 mL/Hr) IV Continuous <Continuous>  dextrose 5%. 1000 milliLiter(s) (50 mL/Hr) IV Continuous <Continuous>  dextrose 50% Injectable 12.5 Gram(s) IV Push once  dextrose 50% Injectable 25 Gram(s) IV Push once  enalapril 20 milliGRAM(s) Oral two times a day  glucagon  Injectable 1 milliGRAM(s) IntraMuscular once  insulin glargine Injectable (LANTUS) 28 Unit(s) SubCutaneous at bedtime  insulin lispro (ADMELOG) corrective regimen sliding scale   SubCutaneous at bedtime  insulin lispro (ADMELOG) corrective regimen sliding scale   SubCutaneous three times a day before meals  insulin lispro Injectable (ADMELOG) 10 Unit(s) SubCutaneous three times a day before meals  lidocaine   4% Patch 1 Patch Transdermal every 24 hours  meropenem  IVPB 1000 milliGRAM(s) IV Intermittent every 8 hours  metoprolol succinate ER 25 milliGRAM(s) Oral daily    MEDICATIONS  (PRN):  acetaminophen     Tablet .. 650 milliGRAM(s) Oral every 6 hours PRN Temp greater or equal to 38C (100.4F), Mild Pain (1 - 3)  dextrose Oral Gel 15 Gram(s) Oral once PRN Blood Glucose LESS THAN 70 milliGRAM(s)/deciliter  melatonin 3 milliGRAM(s) Oral at bedtime PRN Insomnia  senna 2 Tablet(s) Oral at bedtime PRN Constipation  traMADol 25 milliGRAM(s) Oral once PRN Severe Pain (7 - 10)      Vital Signs Last 24 Hrs  T(C): 36.7 (21 May 2024 10:45), Max: 36.8 (20 May 2024 18:00)  T(F): 98 (21 May 2024 10:45), Max: 98.3 (20 May 2024 18:00)  HR: 77 (21 May 2024 10:45) (68 - 77)  BP: 159/90 (21 May 2024 10:45) (117/71 - 159/90)  BP(mean): --  RR: 18 (21 May 2024 10:45) (17 - 18)  SpO2: 98% (21 May 2024 10:45) (94% - 98%)    Parameters below as of 21 May 2024 05:15  Patient On (Oxygen Delivery Method): room air      CAPILLARY BLOOD GLUCOSE      POCT Blood Glucose.: 251 mg/dL (21 May 2024 11:56)  POCT Blood Glucose.: 193 mg/dL (21 May 2024 08:19)  POCT Blood Glucose.: 287 mg/dL (20 May 2024 22:11)  POCT Blood Glucose.: 173 mg/dL (20 May 2024 17:07)    I&O's Summary      PHYSICAL EXAM:  GENERAL: NAD  CHEST/LUNG: Clear to auscultation bilaterally; No wheeze  HEART: Regular rate and rhythm  ABDOMEN: Soft, Nontender, Nondistended  EXTREMITIES: no LE edema  :+hernandez  PSYCH: calm  NEUROLOGY: AAOx3, 4/5 LE  SKIN: No rashes or lesions    LABS:                        14.6   6.37  )-----------( 192      ( 21 May 2024 07:30 )             40.6     05-21    137  |  103  |  27<H>  ----------------------------<  191<H>  4.1   |  20<L>  |  1.18    Ca    10.0      21 May 2024 07:30  Phos  3.8     05-20  Mg     2.10     05-21    TPro  6.7  /  Alb  3.9  /  TBili  0.3  /  DBili  x   /  AST  10  /  ALT  10  /  AlkPhos  38<L>  05-21      CARDIAC MARKERS ( 20 May 2024 06:40 )  x     / x     / 79 U/L / x     / x          Urinalysis Basic - ( 21 May 2024 07:30 )    Color: x / Appearance: x / SG: x / pH: x  Gluc: 191 mg/dL / Ketone: x  / Bili: x / Urobili: x   Blood: x / Protein: x / Nitrite: x   Leuk Esterase: x / RBC: x / WBC x   Sq Epi: x / Non Sq Epi: x / Bacteria: x        RADIOLOGY & ADDITIONAL TESTS:  < from: MR Acute Spinal Cord Compression (05.20.24 @ 20:28) >  MR ACUTE CORD COMPRESSION       < end of copied text >  < from: MR Acute Spinal Cord Compression (05.20.24 @ 20:28) >  Multifocal cord impingement from C2/C3 through C5/C6 secondary to   multilevel posterior disc osteophyte complexes and facet arthropathy. No   significant degenerative change of the thoracic spine within the   limitations of sagittal imaging. Moderate narrowing of the bilateral   neural foramen at L4/L5 and severe narrowing of the bilateral neural   foramen atL5/S1, within the limitations of sagittal imaging.    At C2/C3, there is a posterior disc osteophyte complex asymmetric to the   left with a left paracentral disc protrusion resulting in mild to   moderate impingement on the left ventral cord. No abnormal signal within   the cord. There is mild to moderate narrowing of the bilateral neural   foramen and mild central spinal canal stenosis.    At C3/C4, there is a posterior disc osteophyte complex resulting in   moderate narrowing of the bilateral neural foramen, mild impingement on   the ventral cord at the midline without abnormal signal within the   cervical cord. There is mild central spinal canal stenosis.    At C4/C5, there is a posterior disc osteophyte complex and bilateral   facet arthropathy resulting in moderate compression on the cord and   moderate central spinal canal stenosis. There is suspicion of T2   hyperintense signal within the cord at this level on the axial images,   suggestive of mild myelomalacia. There is moderate to severe narrowing of   the bilateral neural foramen.    At C5/C6, there is a large posterior disc osteophyte complex and   bilateral facet arthropathy resulting in moderate ventral cord   compression and moderate to severe central spinal canal stenosis. T2   prolongation within the cord is suspected on the axial imaging indicative   of myelomalacia. There is moderate to severe bilateral foraminal stenosis.    At C6/C7, there is a posterior disc osteophyte complex resulting in mild   central spinal canal stenosis and severe bilateral foraminal stenosis.    --- End of Report ---      < end of copied text >    Imaging Personally Reviewed:  < from: Xray Chest 1 View- PORTABLE-Routine (Xray Chest 1 View- PORTABLE-Routine .) (05.20.24 @ 12:17) >   XR CHEST PORTABLE    < end of copied text >  < from: Xray Chest 1 View- PORTABLE-Routine (Xray Chest 1 View- PORTABLE-Routine .) (05.20.24 @ 12:17) >  Clear lungs.    < end of copied text >  < from: Xray Cervical Spine Complete w Flex+Ext (05.21.24 @ 10:27) >  XR C SPINE FLEX EXT 6    < end of copied text >  < from: Xray Cervical Spine Complete w Flex+Ext (05.21.24 @ 10:27) >  MPRESSION:  1.  Multilevel spondylosis of the cervical spine, as described above.  2.  Reversal of the normal cervical lordosis.  3.  Dynamic Grade 1 anterolisthesis of C2 onC3.    < end of copied text >    Consultant(s) Notes Reviewed:      Care Discussed with Consultants/Other Providers:

## 2024-05-22 ENCOUNTER — RESULT REVIEW (OUTPATIENT)
Age: 74
End: 2024-05-22

## 2024-05-22 LAB
-  AMPICILLIN: SIGNIFICANT CHANGE UP
-  CIPROFLOXACIN: SIGNIFICANT CHANGE UP
-  LEVOFLOXACIN: SIGNIFICANT CHANGE UP
-  NITROFURANTOIN: SIGNIFICANT CHANGE UP
-  TETRACYCLINE: SIGNIFICANT CHANGE UP
-  VANCOMYCIN: SIGNIFICANT CHANGE UP
ANION GAP SERPL CALC-SCNC: 13 MMOL/L — SIGNIFICANT CHANGE UP (ref 7–14)
BUN SERPL-MCNC: 30 MG/DL — HIGH (ref 7–23)
CALCIUM SERPL-MCNC: 9.8 MG/DL — SIGNIFICANT CHANGE UP (ref 8.4–10.5)
CHLORIDE SERPL-SCNC: 102 MMOL/L — SIGNIFICANT CHANGE UP (ref 98–107)
CO2 SERPL-SCNC: 21 MMOL/L — LOW (ref 22–31)
CREAT SERPL-MCNC: 1.17 MG/DL — SIGNIFICANT CHANGE UP (ref 0.5–1.3)
CULTURE RESULTS: ABNORMAL
EGFR: 66 ML/MIN/1.73M2 — SIGNIFICANT CHANGE UP
GLUCOSE SERPL-MCNC: 207 MG/DL — HIGH (ref 70–99)
HCT VFR BLD CALC: 43.1 % — SIGNIFICANT CHANGE UP (ref 39–50)
HGB BLD-MCNC: 15.4 G/DL — SIGNIFICANT CHANGE UP (ref 13–17)
MAGNESIUM SERPL-MCNC: 2.1 MG/DL — SIGNIFICANT CHANGE UP (ref 1.6–2.6)
MCHC RBC-ENTMCNC: 31.4 PG — SIGNIFICANT CHANGE UP (ref 27–34)
MCHC RBC-ENTMCNC: 35.7 GM/DL — SIGNIFICANT CHANGE UP (ref 32–36)
MCV RBC AUTO: 87.8 FL — SIGNIFICANT CHANGE UP (ref 80–100)
METHOD TYPE: SIGNIFICANT CHANGE UP
NRBC # BLD: 0 /100 WBCS — SIGNIFICANT CHANGE UP (ref 0–0)
NRBC # FLD: 0 K/UL — SIGNIFICANT CHANGE UP (ref 0–0)
ORGANISM # SPEC MICROSCOPIC CNT: ABNORMAL
ORGANISM # SPEC MICROSCOPIC CNT: ABNORMAL
PHOSPHATE SERPL-MCNC: 3.1 MG/DL — SIGNIFICANT CHANGE UP (ref 2.5–4.5)
PLATELET # BLD AUTO: 229 K/UL — SIGNIFICANT CHANGE UP (ref 150–400)
POTASSIUM SERPL-MCNC: 4.5 MMOL/L — SIGNIFICANT CHANGE UP (ref 3.5–5.3)
POTASSIUM SERPL-SCNC: 4.5 MMOL/L — SIGNIFICANT CHANGE UP (ref 3.5–5.3)
RBC # BLD: 4.91 M/UL — SIGNIFICANT CHANGE UP (ref 4.2–5.8)
RBC # FLD: 12.9 % — SIGNIFICANT CHANGE UP (ref 10.3–14.5)
SODIUM SERPL-SCNC: 136 MMOL/L — SIGNIFICANT CHANGE UP (ref 135–145)
SPECIMEN SOURCE: SIGNIFICANT CHANGE UP
WBC # BLD: 7.24 K/UL — SIGNIFICANT CHANGE UP (ref 3.8–10.5)
WBC # FLD AUTO: 7.24 K/UL — SIGNIFICANT CHANGE UP (ref 3.8–10.5)

## 2024-05-22 PROCEDURE — 99232 SBSQ HOSP IP/OBS MODERATE 35: CPT

## 2024-05-22 PROCEDURE — 99233 SBSQ HOSP IP/OBS HIGH 50: CPT

## 2024-05-22 PROCEDURE — 93306 TTE W/DOPPLER COMPLETE: CPT | Mod: 26

## 2024-05-22 PROCEDURE — 93010 ELECTROCARDIOGRAM REPORT: CPT

## 2024-05-22 RX ORDER — INSULIN LISPRO 100/ML
12 VIAL (ML) SUBCUTANEOUS
Refills: 0 | Status: DISCONTINUED | OUTPATIENT
Start: 2024-05-22 | End: 2024-05-24

## 2024-05-22 RX ORDER — TRAMADOL HYDROCHLORIDE 50 MG/1
25 TABLET ORAL EVERY 8 HOURS
Refills: 0 | Status: DISCONTINUED | OUTPATIENT
Start: 2024-05-22 | End: 2024-05-22

## 2024-05-22 RX ORDER — INSULIN GLARGINE 100 [IU]/ML
32 INJECTION, SOLUTION SUBCUTANEOUS AT BEDTIME
Refills: 0 | Status: DISCONTINUED | OUTPATIENT
Start: 2024-05-22 | End: 2024-05-24

## 2024-05-22 RX ORDER — TRAMADOL HYDROCHLORIDE 50 MG/1
25 TABLET ORAL EVERY 8 HOURS
Refills: 0 | Status: DISCONTINUED | OUTPATIENT
Start: 2024-05-22 | End: 2024-05-24

## 2024-05-22 RX ORDER — ENOXAPARIN SODIUM 100 MG/ML
40 INJECTION SUBCUTANEOUS EVERY 24 HOURS
Refills: 0 | Status: DISCONTINUED | OUTPATIENT
Start: 2024-05-22 | End: 2024-05-28

## 2024-05-22 RX ADMIN — LIDOCAINE 1 PATCH: 4 CREAM TOPICAL at 03:48

## 2024-05-22 RX ADMIN — Medication 650 MILLIGRAM(S): at 01:03

## 2024-05-22 RX ADMIN — Medication 2: at 08:26

## 2024-05-22 RX ADMIN — LIDOCAINE 1 PATCH: 4 CREAM TOPICAL at 12:33

## 2024-05-22 RX ADMIN — Medication 2: at 19:13

## 2024-05-22 RX ADMIN — Medication 20 MILLIGRAM(S): at 06:25

## 2024-05-22 RX ADMIN — Medication 25 MILLIGRAM(S): at 06:25

## 2024-05-22 RX ADMIN — Medication 650 MILLIGRAM(S): at 01:33

## 2024-05-22 RX ADMIN — ENOXAPARIN SODIUM 40 MILLIGRAM(S): 100 INJECTION SUBCUTANEOUS at 19:16

## 2024-05-22 RX ADMIN — TRAMADOL HYDROCHLORIDE 25 MILLIGRAM(S): 50 TABLET ORAL at 21:28

## 2024-05-22 RX ADMIN — Medication 10 UNIT(S): at 08:29

## 2024-05-22 RX ADMIN — TRAMADOL HYDROCHLORIDE 25 MILLIGRAM(S): 50 TABLET ORAL at 13:37

## 2024-05-22 RX ADMIN — TRAMADOL HYDROCHLORIDE 25 MILLIGRAM(S): 50 TABLET ORAL at 12:37

## 2024-05-22 RX ADMIN — MEROPENEM 100 MILLIGRAM(S): 1 INJECTION INTRAVENOUS at 13:42

## 2024-05-22 RX ADMIN — Medication 81 MILLIGRAM(S): at 12:34

## 2024-05-22 RX ADMIN — MEROPENEM 100 MILLIGRAM(S): 1 INJECTION INTRAVENOUS at 06:24

## 2024-05-22 RX ADMIN — Medication 2: at 12:30

## 2024-05-22 RX ADMIN — TRAMADOL HYDROCHLORIDE 25 MILLIGRAM(S): 50 TABLET ORAL at 06:12

## 2024-05-22 RX ADMIN — Medication 12 UNIT(S): at 19:13

## 2024-05-22 RX ADMIN — Medication 1 TABLET(S): at 21:28

## 2024-05-22 RX ADMIN — Medication 10 UNIT(S): at 12:31

## 2024-05-22 RX ADMIN — CHLORHEXIDINE GLUCONATE 1 APPLICATION(S): 213 SOLUTION TOPICAL at 12:34

## 2024-05-22 RX ADMIN — Medication 20 MILLIGRAM(S): at 19:14

## 2024-05-22 RX ADMIN — LIDOCAINE 1 PATCH: 4 CREAM TOPICAL at 20:09

## 2024-05-22 RX ADMIN — INSULIN GLARGINE 32 UNIT(S): 100 INJECTION, SOLUTION SUBCUTANEOUS at 22:40

## 2024-05-22 NOTE — CONSULT NOTE ADULT - ASSESSMENT
73 year old M with spinal stenosis and chronic hernandez in place due to neurogenic bladder, with progressive weakness and pain in lower extremities since 5/15. CT cervical spine reveals high-grade bilateral C2-C3, C3-C4, C5-C6, and C6-C7 foraminal stenosis, as well as high-grade C5-C6 central canal stenosis due to advanced disc and uncovertebral joint degeneration.     
This is a 73 w/ PMHx of IDDM2, spinal stenosis c/b neurogenic bladder w/ chronic hernandez, HTN who is presenting to [facility] on 5/19 for lowe extremity weakness.   Denies fevers, chills, dysuria.   In the ER, pt was afebrile, VSS.   Labs without leukocytosis, lactate normal, U/A with 51 WBCx   CT C/T/L spine w/o acute findings, R perinephric stranding noted.     Pt recently presented to the ED on 4/25 for stones in hernandez, U/A was positive, BCx w/ CONS. Pt was discharged on Cefdinir, then called back on 4/27 for the positive BCx. Declined changing hernandez.   U/A on 4/25 w/ 155 WBCs, 3+ species in UCx.   U/A on 4/27 w/ 17 WBCx, UCx ESBL proteus and E faecalis.   Pt was called back after being discharged on 4/27, but declined coming back.     #C/f UTI  #ESBL proteus   #B/l LE weakness     Overall,  73 w/ PMHx of IDDM2, spinal stenosis c/b neurogenic bladder w/ chronic hernandez (last exchanged 5/15) , HTN p/w LE weakness. CT C/T/L spine with acute findings, CT A/P with R perinephric stranding. Pt with known h/o bladder stones and recurrent Proteus infection  Pt afebrile, no leukocytosis, no N/V/D. On exam well appearing, no CVA tenderness, some suprapubic discomfort on deep palpation.  No clear signs of infection, unknown if CT finding of peripheric stranding is consistent with clinical picture.    Plan:   1. Change to Meropenem 1 g q8   2. Would favor a short course of antibiotics (5 days?) as there is no clear infection seen on interview, exam or labs. Unknown if CT findings represent pyelonephritis in the absence of any symptoms   3. F/u UCx   4. Outpatient urology f/u for bladder stones    Thank you for this consult. Inpatient ID team will follow.    Samy Mccullough M.D.  Attending Physician  Division of Infectious Diseases  Department of Medicine    Please contact through MS Teams message.  Office: [phone] (after 5 PM or weekend)  
EKG not in chart plz obtain     Echo < from: TTE W or WO Ultrasound Enhancing Agent (05.22.24 @ 10:35) >   1. The left ventricular cavity is normal in size. Left ventricular wall thickness is normal. Left ventricular systolic function is mildly decreased with an ejection fraction visually estimated at 45 to 50%. There are regional wall motion abnormalities present. Hypokinesis of the inferolateral wall and basal inferior wall.   2. Normal right ventricular cavity size and normal systolic function.   3. Structurally normal mitral valve with normal leaflet excursion. There is calcification of the mitral valve annulus. There is trace mitral regurgitation.   4. The aortic valve appears trileaflet with normal systolic excursion. There is calcification of the aortic valve leaflets. There is mild aortic regurgitation.    < end of copied text >    Assessment and Plan     1) CAD s/p CABG : denies CP SOB with > 4 mets activity , should be in asa and lipitor if not contraindicated     2) Pre op eval Planned for anterior cervical discectomy and fusion C4 to C7, denies CP and SOB with > 4 mets of activity , RCRI class III 10.1% Risk for 30 day MACE c/w metoprolol increase to 50 mg po daily , optimized from a cardiac standpoint     3) DVT PPX lovenox     3)

## 2024-05-22 NOTE — PROGRESS NOTE ADULT - PROBLEM SELECTOR PLAN 6
Diet: Consistent carb  DVT-P: Lovenox   Dispo: pending medical optimization, patient is requesting copies of imaging/labs prior to discharge    #constipation  senna PRN  avoid Miralax per patient request  Plan discussed with ACP

## 2024-05-22 NOTE — PROGRESS NOTE ADULT - ASSESSMENT
This is a 73 w/ PMHx of IDDM2, spinal stenosis c/b neurogenic bladder w/ chronic hernandez, HTN who is presenting to [facility] on 5/19 for lowe extremity weakness.   Denies fevers, chills, dysuria.   In the ER, pt was afebrile, VSS.   Labs without leukocytosis, lactate normal, U/A with 51 WBCx   CT C/T/L spine w/o acute findings, R perinephric stranding noted.     Pt recently presented to the ED on 4/25 for stones in hernandez, U/A was positive, BCx w/ CONS. Pt was discharged on Cefdinir, then called back on 4/27 for the positive BCx. Declined changing hernandez.   U/A on 4/25 w/ 155 WBCs, 3+ species in UCx.   U/A on 4/27 w/ 17 WBCx, UCx ESBL proteus and E faecalis.   Pt was called back after being discharged on 4/27, but declined coming back.     #C/f UTI  #ESBL proteus   #B/l LE weakness     Overall,  73 w/ PMHx of IDDM2, spinal stenosis c/b neurogenic bladder w/ chronic hernandez (last exchanged 5/15) , HTN p/w LE weakness. CT C/T/L spine with acute findings, CT A/P with R perinephric stranding. Pt with known h/o bladder stones and recurrent Proteus infection  Pt afebrile, no leukocytosis, no N/V/D. On exam well appearing, no CVA tenderness, some suprapubic discomfort on deep palpation.  No clear signs of infection, unknown if CT finding of peripheric stranding is consistent with clinical picture.  UCx with E faecalis     Plan:   1. Change to Amoxicillin 875 mg BID until 5/26 for 7 day course, however suspect this colonization rather than true infection.   2. Outpatient urology f/u for bladder stones    Thank you for this consult. Inpatient ID consult team will sign off.    Further changes in lab values, imaging studies, or clinical status will not be known to ID inpatient consultants unless specifically communicated by primary team.    Samy Mccullough MD  Attending Physician  Division of Infectious Diseases  Department of Medicine    Please contact through MS Teams message.  Office: [phone] (after 5 PM or weekend)

## 2024-05-22 NOTE — CHART NOTE - NSCHARTNOTEFT_GEN_A_CORE
73 year old M with spinal stenosis and chronic hernandez in place due to neurogenic bladder, with progressive weakness and pain in lower extremities since 5/15. CT cervical spine reveals high-grade bilateral C2-C3, C3-C4, C5-C6, and C6-C7 foraminal stenosis, as well as high-grade C5-C6 central canal stenosis due to advanced disc and uncovertebral joint degeneration.     Plan for OR wednesday 5/29/24 for Posterior Cervical Fusion  - Please obtain Medical Clearance   - CBC, BMP, Coags, T&S 73 year old M with spinal stenosis and chronic hernandez in place due to neurogenic bladder, with progressive weakness and pain in lower extremities since 5/15. CT cervical spine reveals high-grade bilateral C2-C3, C3-C4, C5-C6, and C6-C7 foraminal stenosis, as well as high-grade C5-C6 central canal stenosis due to advanced disc and uncovertebral joint degeneration.     Plan for OR wednesday 5/29/24 for Posterior Cervical Fusion  - Please obtain Medical Clearance   - CBC, BMP, Coags, T&S  - NPO after midnight Tuesday 5/28/24    Case d/w attending 73 year old M with spinal stenosis and chronic hernandez in place due to neurogenic bladder, with progressive weakness and pain in lower extremities since 5/15. CT cervical spine reveals high-grade bilateral C2-C3, C3-C4, C5-C6, and C6-C7 foraminal stenosis, as well as high-grade C5-C6 central canal stenosis due to advanced disc and uncovertebral joint degeneration.     Plan for OR wednesday 5/29/24 for an anterior cervical discectomy and fusion C4 to C7  - Please obtain Medical Clearance   - CBC, BMP, Coags, T&S  - NPO after midnight Tuesday 5/28/24    Case d/w attending

## 2024-05-22 NOTE — CONSULT NOTE ADULT - SUBJECTIVE AND OBJECTIVE BOX
True Rothman MD  Interventional Cardiology / Endovascular Specialist  [place] Office : [phone] [place] [address] [place] [address]  Tel:   [place] Office : [address] [facility] [place] [address]  Tel: [phone]        HISTORY OF PRESENTING ILLNESS:  73-year-old male with past medical history of IDDM2, CAD s/p CABG spinal stenosis complicated by neurogenic bladder with chronic Salazar, HTN presenting for lower extremity weakness, per pt there is always component of baseline weakness as pt has used a cane to walk in the past. This last week pt reported struggling to ambulate due to pain going down b/l lateral legs along with numbness on anterolateral surface of leg. Pt reported taking tylenol and Aleve for these symptoms but there was no symptom improvement. Pt reports having to use furniture and a walker to ambulate. Pt denies fevers, chills, chest pain, dysuria, saddle anesthesia or fecal incontinence Pt has chronic hx of urinary incontinence in the context of spinal stenosis. Pt was previous found to be ESBL + in early may (patient reports history of Proteus infection in December 2023 s/p abx with recurrence shortly thereafter).     Salazar was changed on Wednesday in the DR.    In the ED VS notable for BP of 160/80. In the ED labs  notable for  and UA + for LE, nitrates, and bacteria. Imaging was notable for cervical stenosis and lordosis and thoracolumbar spine spondylosis.  In the ED pt received Zosyn and 0.5L NS.     PAST MEDICAL & SURGICAL HISTORY:  Diabetes      Neurogenic bladder      Chronic indwelling Salazar catheter      Hypertension      CAD (coronary artery disease)      History of appendectomy      S/P CABG (coronary artery bypass graft)          SOCIAL HISTORY: Substance Use (street drugs): ( x ) never used  (  ) other:    FAMILY HISTORY:  Family hx of lung cancer (Father)        REVIEW OF SYSTEMS:  CONSTITUTIONAL: No fever, weight loss, or fatigue  EYES: No eye pain, visual disturbances, or discharge  ENMT:  No difficulty hearing, tinnitus, vertigo; No sinus or throat pain  BREASTS: No pain, masses, or nipple discharge  GASTROINTESTINAL: No abdominal or epigastric pain. No nausea, vomiting, or hematemesis; No diarrhea or constipation. No melena or hematochezia.  GENITOURINARY: No dysuria, frequency, hematuria, or incontinence  NEUROLOGICAL: No headaches, memory loss, loss of strength, numbness, or tremors  ENDOCRINE: No heat or cold intolerance; No hair loss  MUSCULOSKELETAL: No joint pain or swelling; No muscle, back, or extremity pain  PSYCHIATRIC: No depression, anxiety, mood swings, or difficulty sleeping  HEME/LYMPH: No easy bruising, or bleeding gums  All others negative    MEDICATIONS:  enalapril 20 milliGRAM(s) Oral two times a day  enoxaparin Injectable 40 milliGRAM(s) SubCutaneous every 24 hours  metoprolol succinate ER 25 milliGRAM(s) Oral daily    amoxicillin  875 milliGRAM(s)/clavulanate 1 Tablet(s) Oral two times a day      acetaminophen     Tablet .. 650 milliGRAM(s) Oral every 6 hours PRN  melatonin 3 milliGRAM(s) Oral at bedtime PRN  traMADol 25 milliGRAM(s) Oral every 8 hours PRN    senna 2 Tablet(s) Oral at bedtime PRN    dextrose 50% Injectable 25 Gram(s) IV Push once  dextrose 50% Injectable 12.5 Gram(s) IV Push once  dextrose Oral Gel 15 Gram(s) Oral once PRN  glucagon  Injectable 1 milliGRAM(s) IntraMuscular once  insulin glargine Injectable (LANTUS) 32 Unit(s) SubCutaneous at bedtime  insulin lispro (ADMELOG) corrective regimen sliding scale   SubCutaneous at bedtime  insulin lispro (ADMELOG) corrective regimen sliding scale   SubCutaneous three times a day before meals  insulin lispro Injectable (ADMELOG) 12 Unit(s) SubCutaneous three times a day before meals    chlorhexidine 2% Cloths 1 Application(s) Topical daily  dextrose 10% Bolus 125 milliLiter(s) IV Bolus once  dextrose 5%. 1000 milliLiter(s) IV Continuous <Continuous>  dextrose 5%. 1000 milliLiter(s) IV Continuous <Continuous>  lidocaine   4% Patch 1 Patch Transdermal every 24 hours      FAMILY HISTORY:  Family hx of lung cancer (Father)          Allergies    No Known Allergies    Intolerances    	      PHYSICAL EXAM:  T(C): 36.7 (05-23-24 @ 04:42), Max: 36.8 (05-22-24 @ 14:40)  HR: 67 (05-23-24 @ 04:42) (60 - 89)  BP: 143/87 (05-23-24 @ 04:42) (140/74 - 152/79)  RR: 17 (05-23-24 @ 04:42) (17 - 18)  SpO2: 96% (05-23-24 @ 04:42) (94% - 98%)  Wt(kg): --  I&O's Summary      GENERAL: NAD  EYES: conjunctiva and sclera clear  ENMT: No tonsillar erythema, exudates, or enlargement  Cardiovascular: Normal S1 S2, No JVD, No murmurs, No edema  Respiratory: Lungs clear to auscultation	  Gastrointestinal:  Soft, Non-tender, + BS	  Extremities: No edema    LABS:	 	    CARDIAC MARKERS:                                  14.3   7.81  )-----------( 195      ( 23 May 2024 06:35 )             40.5     05-23    135  |  102  |  29<H>  ----------------------------<  165<H>  4.2   |  22  |  1.10    Ca    9.6      23 May 2024 06:35  Phos  3.1     05-22  Mg     2.10     05-23    TPro  6.7  /  Alb  3.9  /  TBili  0.3  /  DBili  x   /  AST  11  /  ALT  13  /  AlkPhos  38<L>  05-23    proBNP:   Lipid Profile:   HgA1c:   TSH:     Consultant(s) Notes Reviewed:  [x ] YES  [ ] NO    Care Discussed with Consultants/Other Providers [ x] YES  [ ] NO    Imaging Personally Reviewed independently:  [x] YES  [ ] NO    All labs, radiologic studies, vitals, orders and medications list reviewed. Patient is seen and examined at bedside. Case discussed with medical team.    ASSESSMENT/PLAN:

## 2024-05-22 NOTE — PROGRESS NOTE ADULT - PROBLEM SELECTOR PLAN 1
LE weakness with new pain and anterolateral numbness L>R  -lead neg,  rpr neg,   -f/u B1, B6,  -b12 elevated  -HIV neg, TSH wnl, ck wnl  -MRI results reviewed, f/u with nsx  -rectal tone intact on ED exam, patient denies fecal incontinence or saddle anesthesia  fall precautions  PT consulted rec PMR consult  - acetaminophen PRN, lidocaine patch daily, requesting tramadol, had used in the past for pain  reports having taken Myoflex gel, chlorzoxazone and Tramadol in the past for pain (iSTOP Reference #: [id number], negative, however patient receives some care in DR)  -Neurosx consult appreciated, f/u recs regarding sx  - cxr neg

## 2024-05-22 NOTE — PROGRESS NOTE ADULT - SUBJECTIVE AND OBJECTIVE BOX
Infectious Diseases Follow Up:    Patient is a 73y old  Male who presents with a chief complaint of BL LE weakness (21 May 2024 14:13)      Interval History/ROS:  No acute events     Allergies  No Known Allergies        ANTIMICROBIALS:  meropenem  IVPB 1000 every 8 hours      Current Abx:     Previous Abx     OTHER MEDS:  MEDICATIONS  (STANDING):  acetaminophen     Tablet .. 650 every 6 hours PRN  aspirin  chewable 81 daily  dextrose 50% Injectable 12.5 once  dextrose 50% Injectable 25 once  dextrose Oral Gel 15 once PRN  enalapril 20 two times a day  glucagon  Injectable 1 once  insulin glargine Injectable (LANTUS) 28 at bedtime  insulin lispro (ADMELOG) corrective regimen sliding scale  at bedtime  insulin lispro (ADMELOG) corrective regimen sliding scale  three times a day before meals  insulin lispro Injectable (ADMELOG) 10 three times a day before meals  melatonin 3 at bedtime PRN  metoprolol succinate ER 25 daily  senna 2 at bedtime PRN  traMADol 25 once PRN      Vital Signs Last 24 Hrs  T(C): 36.7 (22 May 2024 05:00), Max: 36.8 (21 May 2024 17:40)  T(F): 98.1 (22 May 2024 05:00), Max: 98.3 (21 May 2024 22:20)  HR: 69 (22 May 2024 05:00) (66 - 72)  BP: 114/60 (22 May 2024 05:00) (114/60 - 158/85)  BP(mean): --  RR: 17 (22 May 2024 05:00) (17 - 17)  SpO2: 96% (22 May 2024 05:00) (96% - 98%)    Parameters below as of 22 May 2024 05:00  Patient On (Oxygen Delivery Method): room air        PHYSICAL EXAM:  GENERAL: NAD, well-developed  HEAD:  Atraumatic, Normocephalic  EYES: EOMI, conjunctiva and sclera clear  CHEST/LUNG: On RA, not in respiratory distress  PSYCH: AAOx3                            15.4   7.24  )-----------( 229      ( 22 May 2024 07:26 )             43.1       05-22    136  |  102  |  30<H>  ----------------------------<  207<H>  4.5   |  21<L>  |  1.17    Ca    9.8      22 May 2024 07:26  Phos  3.1     05-22  Mg     2.10     05-22    TPro  6.7  /  Alb  3.9  /  TBili  0.3  /  DBili  x   /  AST  10  /  ALT  10  /  AlkPhos  38<L>  05-21      Urinalysis Basic - ( 22 May 2024 07:26 )    Color: x / Appearance: x / SG: x / pH: x  Gluc: 207 mg/dL / Ketone: x  / Bili: x / Urobili: x   Blood: x / Protein: x / Nitrite: x   Leuk Esterase: x / RBC: x / WBC x   Sq Epi: x / Non Sq Epi: x / Bacteria: x        MICROBIOLOGY:  v  Catheterized Catheterized  05-19-24   >100,000 CFU/ml Enterococcus faecalis  <10,000 CFU/ml Normal Urogenital santos present  --  --      .Blood Blood-Peripheral  05-19-24   No growth at 48 Hours  --  --      .Blood Blood-Peripheral  05-19-24   No growth at 48 Hours  --  --      Clean Catch Clean Catch (Midstream)  04-27-24   >100,000 CFU/ml Proteus mirabilis ESBL  >100,000 CFU/ml Enterococcus faecalis  --  Proteus mirabilis ESBL  Enterococcus faecalis      .Blood Blood-Peripheral  04-27-24   No growth at 5 days  --  --      .Blood Blood-Peripheral  04-27-24   No growth at 5 days  --  --      .Blood Blood-Peripheral  04-25-24   Growth in anaerobic bottle: Staphylococcus hominis  Isolation of Coagulase negative Staphylococcus from single blood culture  sets may represent  contamination. Contact the Microbiology Department at [phone] if  susceptibility testing is  clinically indicated.  Direct identification is available within approximately 3-5  hours either by Blood Panel Multiplexed PCR or Direct  MALDI-TOF. Details: [email or web address]  --  Blood Culture PCR      Clean Catch Clean Catch (Midstream)  04-25-24   >=3 organisms. Probable collection contamination.  --  --                RADIOLOGY:

## 2024-05-22 NOTE — PROGRESS NOTE ADULT - SUBJECTIVE AND OBJECTIVE BOX
Patient is a 73y old  Male who presents with a chief complaint of BL LE weakness (22 May 2024 10:52)      SUBJECTIVE / OVERNIGHT EVENTS: Pt seen and examined at 11:35am, no overnight events, pt reports pain in both legs R>L, also with numbness of his legs and rt buttock pain and gait instability, asking for tramadol, went for echo today, had bm yesterday, no other complaints, no other new issues reported.        MEDICATIONS  (STANDING):  amoxicillin  875 milliGRAM(s)/clavulanate 1 Tablet(s) Oral two times a day  aspirin  chewable 81 milliGRAM(s) Oral daily  chlorhexidine 2% Cloths 1 Application(s) Topical daily  dextrose 10% Bolus 125 milliLiter(s) IV Bolus once  dextrose 5%. 1000 milliLiter(s) (50 mL/Hr) IV Continuous <Continuous>  dextrose 5%. 1000 milliLiter(s) (100 mL/Hr) IV Continuous <Continuous>  dextrose 50% Injectable 12.5 Gram(s) IV Push once  dextrose 50% Injectable 25 Gram(s) IV Push once  enalapril 20 milliGRAM(s) Oral two times a day  glucagon  Injectable 1 milliGRAM(s) IntraMuscular once  insulin glargine Injectable (LANTUS) 32 Unit(s) SubCutaneous at bedtime  insulin lispro (ADMELOG) corrective regimen sliding scale   SubCutaneous three times a day before meals  insulin lispro (ADMELOG) corrective regimen sliding scale   SubCutaneous at bedtime  insulin lispro Injectable (ADMELOG) 12 Unit(s) SubCutaneous three times a day before meals  lidocaine   4% Patch 1 Patch Transdermal every 24 hours  metoprolol succinate ER 25 milliGRAM(s) Oral daily    MEDICATIONS  (PRN):  acetaminophen     Tablet .. 650 milliGRAM(s) Oral every 6 hours PRN Temp greater or equal to 38C (100.4F), Mild Pain (1 - 3)  dextrose Oral Gel 15 Gram(s) Oral once PRN Blood Glucose LESS THAN 70 milliGRAM(s)/deciliter  melatonin 3 milliGRAM(s) Oral at bedtime PRN Insomnia  senna 2 Tablet(s) Oral at bedtime PRN Constipation  traMADol 25 milliGRAM(s) Oral every 8 hours PRN Severe Pain (7 - 10)      Vital Signs Last 24 Hrs  T(C): 36.7 (22 May 2024 11:00), Max: 36.8 (21 May 2024 17:40)  T(F): 98.1 (22 May 2024 11:00), Max: 98.3 (21 May 2024 22:20)  HR: 60 (22 May 2024 11:00) (60 - 72)  BP: 147/83 (22 May 2024 11:00) (114/60 - 158/85)  BP(mean): --  RR: 18 (22 May 2024 11:00) (17 - 18)  SpO2: 97% (22 May 2024 11:00) (96% - 98%)    Parameters below as of 22 May 2024 05:00  Patient On (Oxygen Delivery Method): room air      CAPILLARY BLOOD GLUCOSE      POCT Blood Glucose.: 225 mg/dL (22 May 2024 12:13)  POCT Blood Glucose.: 219 mg/dL (22 May 2024 08:16)  POCT Blood Glucose.: 248 mg/dL (21 May 2024 22:08)  POCT Blood Glucose.: 191 mg/dL (21 May 2024 17:33)    I&O's Summary    21 May 2024 07:01  -  22 May 2024 07:00  --------------------------------------------------------  IN: 0 mL / OUT: 600 mL / NET: -600 mL        PHYSICAL EXAM:  GENERAL: NAD  CHEST/LUNG: Clear to auscultation bilaterally; No wheeze  HEART: Regular rate and rhythm  ABDOMEN: Soft, Nontender, Nondistended  EXTREMITIES: no LE edema  :+hernandez  PSYCH: calm  NEUROLOGY: AAOx3, 4/5 LE  SKIN: No rashes or lesions    LABS:                        15.4   7.24  )-----------( 229      ( 22 May 2024 07:26 )             43.1     05-22    136  |  102  |  30<H>  ----------------------------<  207<H>  4.5   |  21<L>  |  1.17    Ca    9.8      22 May 2024 07:26  Phos  3.1     05-22  Mg     2.10     05-22    TPro  6.7  /  Alb  3.9  /  TBili  0.3  /  DBili  x   /  AST  10  /  ALT  10  /  AlkPhos  38<L>  05-21          Urinalysis Basic - ( 22 May 2024 07:26 )    Color: x / Appearance: x / SG: x / pH: x  Gluc: 207 mg/dL / Ketone: x  / Bili: x / Urobili: x   Blood: x / Protein: x / Nitrite: x   Leuk Esterase: x / RBC: x / WBC x   Sq Epi: x / Non Sq Epi: x / Bacteria: x        RADIOLOGY & ADDITIONAL TESTS:  < from: TTE W or WO Ultrasound Enhancing Agent (05.22.24 @ 10:35) >  TRANSTHORACIC ECHOCARDIOGRAM REPORT    < end of copied text >  < from: TTE W or WO Ultrasound Enhancing Agent (05.22.24 @ 10:35) >  1. The left ventricular cavity is normal in size. Left ventricular wall thickness is normal. Left ventricular systolic function is mildly decreased with an ejection fraction visually estimated at 45 to 50%. There are regional wall motion abnormalities present. Hypokinesis of the inferolateral wall and basal inferior wall.   2. Normal right ventricular cavity size and normal systolic function.   3. Structurally normal mitral valve with normal leaflet excursion. There is calcification of the mitral valve annulus. There is trace mitral regurgitation.   4. The aortic valve appears trileaflet with normal systolic excursion. There is calcification of the aortic valve leaflets. There is mild aortic regurgitation.    < end of copied text >    Imaging Personally Reviewed:    Consultant(s) Notes Reviewed:      Care Discussed with Consultants/Other Providers:

## 2024-05-23 ENCOUNTER — TRANSCRIPTION ENCOUNTER (OUTPATIENT)
Age: 74
End: 2024-05-23

## 2024-05-23 LAB
ALBUMIN SERPL ELPH-MCNC: 3.9 G/DL — SIGNIFICANT CHANGE UP (ref 3.3–5)
ALP SERPL-CCNC: 38 U/L — LOW (ref 40–120)
ALT FLD-CCNC: 13 U/L — SIGNIFICANT CHANGE UP (ref 4–41)
ANION GAP SERPL CALC-SCNC: 11 MMOL/L — SIGNIFICANT CHANGE UP (ref 7–14)
AST SERPL-CCNC: 11 U/L — SIGNIFICANT CHANGE UP (ref 4–40)
BASOPHILS # BLD AUTO: 0.06 K/UL — SIGNIFICANT CHANGE UP (ref 0–0.2)
BASOPHILS NFR BLD AUTO: 0.8 % — SIGNIFICANT CHANGE UP (ref 0–2)
BILIRUB SERPL-MCNC: 0.3 MG/DL — SIGNIFICANT CHANGE UP (ref 0.2–1.2)
BUN SERPL-MCNC: 29 MG/DL — HIGH (ref 7–23)
CALCIUM SERPL-MCNC: 9.6 MG/DL — SIGNIFICANT CHANGE UP (ref 8.4–10.5)
CHLORIDE SERPL-SCNC: 102 MMOL/L — SIGNIFICANT CHANGE UP (ref 98–107)
CO2 SERPL-SCNC: 22 MMOL/L — SIGNIFICANT CHANGE UP (ref 22–31)
CREAT SERPL-MCNC: 1.1 MG/DL — SIGNIFICANT CHANGE UP (ref 0.5–1.3)
EGFR: 71 ML/MIN/1.73M2 — SIGNIFICANT CHANGE UP
EOSINOPHIL # BLD AUTO: 0.21 K/UL — SIGNIFICANT CHANGE UP (ref 0–0.5)
EOSINOPHIL NFR BLD AUTO: 2.7 % — SIGNIFICANT CHANGE UP (ref 0–6)
GLUCOSE SERPL-MCNC: 165 MG/DL — HIGH (ref 70–99)
HCT VFR BLD CALC: 40.5 % — SIGNIFICANT CHANGE UP (ref 39–50)
HGB BLD-MCNC: 14.3 G/DL — SIGNIFICANT CHANGE UP (ref 13–17)
IANC: 4.6 K/UL — SIGNIFICANT CHANGE UP (ref 1.8–7.4)
IMM GRANULOCYTES NFR BLD AUTO: 0.5 % — SIGNIFICANT CHANGE UP (ref 0–0.9)
LYMPHOCYTES # BLD AUTO: 2.22 K/UL — SIGNIFICANT CHANGE UP (ref 1–3.3)
LYMPHOCYTES # BLD AUTO: 28.4 % — SIGNIFICANT CHANGE UP (ref 13–44)
MAGNESIUM SERPL-MCNC: 2.1 MG/DL — SIGNIFICANT CHANGE UP (ref 1.6–2.6)
MCHC RBC-ENTMCNC: 31.2 PG — SIGNIFICANT CHANGE UP (ref 27–34)
MCHC RBC-ENTMCNC: 35.3 GM/DL — SIGNIFICANT CHANGE UP (ref 32–36)
MCV RBC AUTO: 88.4 FL — SIGNIFICANT CHANGE UP (ref 80–100)
MONOCYTES # BLD AUTO: 0.68 K/UL — SIGNIFICANT CHANGE UP (ref 0–0.9)
MONOCYTES NFR BLD AUTO: 8.7 % — SIGNIFICANT CHANGE UP (ref 2–14)
NEUTROPHILS # BLD AUTO: 4.6 K/UL — SIGNIFICANT CHANGE UP (ref 1.8–7.4)
NEUTROPHILS NFR BLD AUTO: 58.9 % — SIGNIFICANT CHANGE UP (ref 43–77)
NRBC # BLD: 0 /100 WBCS — SIGNIFICANT CHANGE UP (ref 0–0)
NRBC # FLD: 0 K/UL — SIGNIFICANT CHANGE UP (ref 0–0)
PLATELET # BLD AUTO: 195 K/UL — SIGNIFICANT CHANGE UP (ref 150–400)
POTASSIUM SERPL-MCNC: 4.2 MMOL/L — SIGNIFICANT CHANGE UP (ref 3.5–5.3)
POTASSIUM SERPL-SCNC: 4.2 MMOL/L — SIGNIFICANT CHANGE UP (ref 3.5–5.3)
PROT SERPL-MCNC: 6.7 G/DL — SIGNIFICANT CHANGE UP (ref 6–8.3)
RBC # BLD: 4.58 M/UL — SIGNIFICANT CHANGE UP (ref 4.2–5.8)
RBC # FLD: 12.7 % — SIGNIFICANT CHANGE UP (ref 10.3–14.5)
SODIUM SERPL-SCNC: 135 MMOL/L — SIGNIFICANT CHANGE UP (ref 135–145)
WBC # BLD: 7.81 K/UL — SIGNIFICANT CHANGE UP (ref 3.8–10.5)
WBC # FLD AUTO: 7.81 K/UL — SIGNIFICANT CHANGE UP (ref 3.8–10.5)

## 2024-05-23 PROCEDURE — 99232 SBSQ HOSP IP/OBS MODERATE 35: CPT

## 2024-05-23 RX ADMIN — LIDOCAINE 1 PATCH: 4 CREAM TOPICAL at 19:53

## 2024-05-23 RX ADMIN — Medication 1 TABLET(S): at 08:45

## 2024-05-23 RX ADMIN — Medication 12 UNIT(S): at 12:24

## 2024-05-23 RX ADMIN — Medication 25 MILLIGRAM(S): at 05:46

## 2024-05-23 RX ADMIN — Medication 12 UNIT(S): at 18:16

## 2024-05-23 RX ADMIN — CHLORHEXIDINE GLUCONATE 1 APPLICATION(S): 213 SOLUTION TOPICAL at 12:25

## 2024-05-23 RX ADMIN — Medication 12 UNIT(S): at 08:44

## 2024-05-23 RX ADMIN — LIDOCAINE 1 PATCH: 4 CREAM TOPICAL at 15:52

## 2024-05-23 RX ADMIN — LIDOCAINE 1 PATCH: 4 CREAM TOPICAL at 01:14

## 2024-05-23 RX ADMIN — Medication 2: at 12:23

## 2024-05-23 RX ADMIN — Medication 20 MILLIGRAM(S): at 05:46

## 2024-05-23 RX ADMIN — TRAMADOL HYDROCHLORIDE 25 MILLIGRAM(S): 50 TABLET ORAL at 05:46

## 2024-05-23 RX ADMIN — Medication 20 MILLIGRAM(S): at 18:17

## 2024-05-23 RX ADMIN — Medication 1: at 08:44

## 2024-05-23 RX ADMIN — ENOXAPARIN SODIUM 40 MILLIGRAM(S): 100 INJECTION SUBCUTANEOUS at 18:17

## 2024-05-23 RX ADMIN — Medication 1 TABLET(S): at 22:44

## 2024-05-23 RX ADMIN — INSULIN GLARGINE 32 UNIT(S): 100 INJECTION, SOLUTION SUBCUTANEOUS at 22:44

## 2024-05-23 RX ADMIN — TRAMADOL HYDROCHLORIDE 25 MILLIGRAM(S): 50 TABLET ORAL at 06:12

## 2024-05-23 NOTE — PROGRESS NOTE ADULT - PROBLEM SELECTOR PLAN 1
LE weakness with new pain and anterolateral numbness L>R  -lead neg,  rpr neg,   -f/u B1, B6,  -b12 elevated  -HIV neg, TSH wnl, ck wnl  -MRI with cord impingement planned for an anterior cervical discectomy and fusion C4 to C7 on 5/29  -rectal tone intact on ED exam, patient denies fecal incontinence or saddle anesthesia  fall precautions  -PT consulted rec PMR consult  - acetaminophen PRN, lidocaine patch daily, requesting tramadol, had used in the past for pain  reports having taken Myoflex gel, chlorzoxazone and Tramadol in the past for pain (iSTOP Reference #: [id number], negative, however patient receives some care in DR)  -Neurosx consult appreciated, f/u recs regarding sx  - cxr neg

## 2024-05-23 NOTE — DISCHARGE NOTE PROVIDER - NSDCQMSTROKE_NEU_ALL_CORE
No
[FreeTextEntry1] : [place] Location \par [facility] \par  \par [address] [facility] \par [facility], [address] \par [phone]\par

## 2024-05-23 NOTE — PROGRESS NOTE ADULT - SUBJECTIVE AND OBJECTIVE BOX
True Rothman MD  Interventional Cardiology / Endovascular Specialist  [place] Office : [phone] [place] [address] [place] [address]  Tel:   [place] Office : [address] [facility] [place] [address]  Tel: [phone]    pt is lying in bed in NAD  	  MEDICATIONS:  enalapril 20 milliGRAM(s) Oral two times a day  enoxaparin Injectable 40 milliGRAM(s) SubCutaneous every 24 hours  metoprolol succinate ER 25 milliGRAM(s) Oral daily    amoxicillin  875 milliGRAM(s)/clavulanate 1 Tablet(s) Oral two times a day      acetaminophen     Tablet .. 650 milliGRAM(s) Oral every 6 hours PRN  melatonin 3 milliGRAM(s) Oral at bedtime PRN  traMADol 25 milliGRAM(s) Oral every 8 hours PRN    senna 2 Tablet(s) Oral at bedtime PRN    dextrose 50% Injectable 25 Gram(s) IV Push once  dextrose 50% Injectable 12.5 Gram(s) IV Push once  dextrose Oral Gel 15 Gram(s) Oral once PRN  glucagon  Injectable 1 milliGRAM(s) IntraMuscular once  insulin glargine Injectable (LANTUS) 32 Unit(s) SubCutaneous at bedtime  insulin lispro (ADMELOG) corrective regimen sliding scale   SubCutaneous three times a day before meals  insulin lispro (ADMELOG) corrective regimen sliding scale   SubCutaneous at bedtime  insulin lispro Injectable (ADMELOG) 12 Unit(s) SubCutaneous three times a day before meals    chlorhexidine 2% Cloths 1 Application(s) Topical daily  dextrose 10% Bolus 125 milliLiter(s) IV Bolus once  dextrose 5%. 1000 milliLiter(s) IV Continuous <Continuous>  dextrose 5%. 1000 milliLiter(s) IV Continuous <Continuous>  lidocaine   4% Patch 1 Patch Transdermal every 24 hours      PAST MEDICAL/SURGICAL HISTORY  PAST MEDICAL & SURGICAL HISTORY:  Diabetes      Neurogenic bladder      Chronic indwelling Salazar catheter      Hypertension      CAD (coronary artery disease)      History of appendectomy      S/P CABG (coronary artery bypass graft)          SOCIAL HISTORY: Substance Use (street drugs): ( x ) never used  (  ) other:    FAMILY HISTORY:  Family hx of lung cancer (Father)      PHYSICAL EXAM:  T(C): 36.9 (05-23-24 @ 10:00), Max: 36.9 (05-23-24 @ 10:00)  HR: 78 (05-23-24 @ 10:00) (67 - 89)  BP: 151/86 (05-23-24 @ 10:00) (140/74 - 152/79)  RR: 18 (05-23-24 @ 10:00) (17 - 18)  SpO2: 97% (05-23-24 @ 10:00) (94% - 98%)  Wt(kg): --  I&O's Summary    23 May 2024 07:01  -  23 May 2024 13:41  --------------------------------------------------------  IN: 0 mL / OUT: 900 mL / NET: -900 mL        GENERAL: NAD  EYES: conjunctiva and sclera clear  ENMT: No tonsillar erythema, exudates, or enlargement  Cardiovascular: Normal S1 S2, No JVD, No murmurs, No edema  Respiratory: Lungs clear to auscultation	  Gastrointestinal:  Soft, Non-tender, + BS	  Extremities: No edema                          14.3   7.81  )-----------( 195      ( 23 May 2024 06:35 )             40.5     05-23    135  |  102  |  29<H>  ----------------------------<  165<H>  4.2   |  22  |  1.10    Ca    9.6      23 May 2024 06:35  Phos  3.1     05-22  Mg     2.10     05-23    TPro  6.7  /  Alb  3.9  /  TBili  0.3  /  DBili  x   /  AST  11  /  ALT  13  /  AlkPhos  38<L>  05-23    proBNP:   Lipid Profile:   HgA1c:   TSH:     Consultant(s) Notes Reviewed:  [x ] YES  [ ] NO    Care Discussed with Consultants/Other Providers [ x] YES  [ ] NO    Imaging Personally Reviewed independently:  [x] YES  [ ] NO    All labs, radiologic studies, vitals, orders and medications list reviewed. Patient is seen and examined at bedside. Case discussed with medical team.

## 2024-05-23 NOTE — PROGRESS NOTE ADULT - SUBJECTIVE AND OBJECTIVE BOX
Patient is a 73y old  Male who presents with a chief complaint of BL LE weakness (23 May 2024 13:40)      SUBJECTIVE / OVERNIGHT EVENTS: Pt seen and examined at 11:20am, no overnight events, pt reports pain in both legs R>L, also with numbness of his legs and rt buttock pain and gait instability, no new issues today, no other complaints, no other new issues reported.      MEDICATIONS  (STANDING):  amoxicillin  875 milliGRAM(s)/clavulanate 1 Tablet(s) Oral two times a day  chlorhexidine 2% Cloths 1 Application(s) Topical daily  dextrose 10% Bolus 125 milliLiter(s) IV Bolus once  dextrose 5%. 1000 milliLiter(s) (50 mL/Hr) IV Continuous <Continuous>  dextrose 5%. 1000 milliLiter(s) (100 mL/Hr) IV Continuous <Continuous>  dextrose 50% Injectable 12.5 Gram(s) IV Push once  dextrose 50% Injectable 25 Gram(s) IV Push once  enalapril 20 milliGRAM(s) Oral two times a day  enoxaparin Injectable 40 milliGRAM(s) SubCutaneous every 24 hours  glucagon  Injectable 1 milliGRAM(s) IntraMuscular once  insulin glargine Injectable (LANTUS) 32 Unit(s) SubCutaneous at bedtime  insulin lispro (ADMELOG) corrective regimen sliding scale   SubCutaneous at bedtime  insulin lispro (ADMELOG) corrective regimen sliding scale   SubCutaneous three times a day before meals  insulin lispro Injectable (ADMELOG) 12 Unit(s) SubCutaneous three times a day before meals  lidocaine   4% Patch 1 Patch Transdermal every 24 hours  metoprolol succinate ER 25 milliGRAM(s) Oral daily    MEDICATIONS  (PRN):  acetaminophen     Tablet .. 650 milliGRAM(s) Oral every 6 hours PRN Temp greater or equal to 38C (100.4F), Mild Pain (1 - 3)  dextrose Oral Gel 15 Gram(s) Oral once PRN Blood Glucose LESS THAN 70 milliGRAM(s)/deciliter  melatonin 3 milliGRAM(s) Oral at bedtime PRN Insomnia  senna 2 Tablet(s) Oral at bedtime PRN Constipation  traMADol 25 milliGRAM(s) Oral every 8 hours PRN Severe Pain (7 - 10)      Vital Signs Last 24 Hrs  T(C): 36.9 (23 May 2024 10:00), Max: 36.9 (23 May 2024 10:00)  T(F): 98.5 (23 May 2024 10:00), Max: 98.5 (23 May 2024 10:00)  HR: 78 (23 May 2024 10:00) (67 - 89)  BP: 151/86 (23 May 2024 10:00) (143/87 - 152/79)  BP(mean): --  RR: 18 (23 May 2024 10:00) (17 - 18)  SpO2: 97% (23 May 2024 10:00) (95% - 98%)    Parameters below as of 23 May 2024 04:42  Patient On (Oxygen Delivery Method): room air      CAPILLARY BLOOD GLUCOSE      POCT Blood Glucose.: 220 mg/dL (23 May 2024 12:00)  POCT Blood Glucose.: 181 mg/dL (23 May 2024 08:16)  POCT Blood Glucose.: 178 mg/dL (22 May 2024 22:13)  POCT Blood Glucose.: 231 mg/dL (22 May 2024 19:09)  POCT Blood Glucose.: 163 mg/dL (22 May 2024 17:00)    I&O's Summary    23 May 2024 07:01  -  23 May 2024 14:55  --------------------------------------------------------  IN: 0 mL / OUT: 900 mL / NET: -900 mL        PHYSICAL EXAM:  GENERAL: NAD  CHEST/LUNG: Clear to auscultation bilaterally; No wheeze  HEART: Regular rate and rhythm  ABDOMEN: Soft, Nontender, Nondistended  EXTREMITIES: no LE edema  :+hernandez  PSYCH: calm  NEUROLOGY: AAOx3  SKIN: No rashes or lesions    LABS:                        14.3   7.81  )-----------( 195      ( 23 May 2024 06:35 )             40.5     05-23    135  |  102  |  29<H>  ----------------------------<  165<H>  4.2   |  22  |  1.10    Ca    9.6      23 May 2024 06:35  Phos  3.1     05-22  Mg     2.10     05-23    TPro  6.7  /  Alb  3.9  /  TBili  0.3  /  DBili  x   /  AST  11  /  ALT  13  /  AlkPhos  38<L>  05-23          Urinalysis Basic - ( 23 May 2024 06:35 )    Color: x / Appearance: x / SG: x / pH: x  Gluc: 165 mg/dL / Ketone: x  / Bili: x / Urobili: x   Blood: x / Protein: x / Nitrite: x   Leuk Esterase: x / RBC: x / WBC x   Sq Epi: x / Non Sq Epi: x / Bacteria: x        RADIOLOGY & ADDITIONAL TESTS:    Imaging Personally Reviewed:    Consultant(s) Notes Reviewed:      Care Discussed with Consultants/Other Providers:

## 2024-05-23 NOTE — DISCHARGE NOTE PROVIDER - NSDCFUADDINST_GEN_ALL_CORE_FT
- You had surgery on 5/29/24. The surgery you had was ACDF C4-7    - Remove bandage from incision site on post op day 3 if it was not removed by the surgical team prior to discharge. Once removed, incision site does not need a bandage or ointment on it. If you have steri strips (small, skinny beige strips), they will eventually fall off over time. Do not pull at steri strips. If steri strips are more than prison off, you may remove them. Do not touch or scratch incision to prevent infection.    - If your incision is closed with clear sutures, these are absorbable and will dissolve over time. If you see metallic staples or black stitches, these will need to be removed in the office 7-14 days after surgery. Your surgeon will tell you at your follow up appt when your sutures/staples will be removed, if applicable.  Do not pick or scratch at incision.     - Shower daily with shampoo/soap on post operative day 4 (DATE: ) Avoid long soaks and do not submerge incision in water (no baths.) Allow soap and water to run over the incision. Pat incision area dry with clean towel- do not scrub. Please shower regularly to ensure incision stays clean to avoid post operative infections.  You may have a body shower daily, as long as your head incision is covered by a shower cap and does not get wet until post op day 4.     - Notify your surgeon if you notice increased redness, drainage or your incision area opening.     - Return to ER immediately for high fevers, severe headache, vomiting, lethargy or weakness    - Please call your neurosurgeon following discharge to make follow up appointment in 1 week after discharge unless otherwise specified. See contact information.    - Prescription post operative medication has been sent to [facility] in the hospital. All post operative prescriptions should be picked up before departing the hospital. You can also take over the counter tylenol for pain as needed.     - Ambulate as tolerate. Continue with all "activities of daily living." Avoid strenuous activity or heavy lifting until cleared for additional activity at your follow up appointment. You cannot drive while taking narcotics (oxycodone, valium, etc.)     - Do not return to work or school until cleared by your neurosurgeon at your follow up visit unless specified to you during your hospital stay    - Do not take any blood thinning medications such as aspirin, motrin, ibuprofen, warfarin, coumadin, plavix, heparin, lovenox, Xarelto, Eliquis etc. until cleared by your neurosurgeon    - Surgery, anesthesia, and pain medications can cause constipation. Please take over the counter stool softeners daily until regular bowel movements are achieved. Examples include Miralax, Senna, Colace, Milk of Magnesia, or Dulcolax suppositories. Please consult drug store pharmacist or pediatrician if there are question about dosing if the patient is pediatric.

## 2024-05-23 NOTE — PROGRESS NOTE ADULT - ASSESSMENT
EKG not in chart plz obtain     Echo < from: TTE W or WO Ultrasound Enhancing Agent (05.22.24 @ 10:35) >   1. The left ventricular cavity is normal in size. Left ventricular wall thickness is normal. Left ventricular systolic function is mildly decreased with an ejection fraction visually estimated at 45 to 50%. There are regional wall motion abnormalities present. Hypokinesis of the inferolateral wall and basal inferior wall.   2. Normal right ventricular cavity size and normal systolic function.   3. Structurally normal mitral valve with normal leaflet excursion. There is calcification of the mitral valve annulus. There is trace mitral regurgitation.   4. The aortic valve appears trileaflet with normal systolic excursion. There is calcification of the aortic valve leaflets. There is mild aortic regurgitation.    < end of copied text >    Assessment and Plan     1) CAD s/p CABG : denies CP SOB with > 4 mets activity , should be in asa and lipitor if not contraindicated     2) Pre op eval Planned for anterior cervical discectomy and fusion C4 to C7, denies CP and SOB with > 4 mets of activity , RCRI class III 10.1% Risk for 30 day MACE c/w metoprolol increase to 50 mg po daily , optimized from a cardiac standpoint     3) DVT PPX lovenox

## 2024-05-23 NOTE — DISCHARGE NOTE PROVIDER - NSDCMRMEDTOKEN_GEN_ALL_CORE_FT
aspirin 81 mg oral tablet: orally once a day  Claritin 10 mg oral tablet: 1 tab(s) orally once a day  enalapril 20 mg oral tablet: 1 tab(s) orally 2 times a day  Lantus 100 units/mL subcutaneous solution: 40 unit(s) subcutaneous once a day (at bedtime)  metFORMIN 1000 mg oral tablet: 1 tab(s) orally 2 times a day  metoprolol succinate 25 mg oral capsule, extended release: 1 cap(s) orally once a day  NovoLOG 100 units/mL injectable solution: 16 unit(s) injectable 3 times a day with meals   bisacodyl 5 mg oral delayed release tablet: 1 tab(s) orally once a day (at bedtime)  Claritin 10 mg oral tablet: 1 tab(s) orally once a day  cyclobenzaprine 5 mg oral tablet: 1 tab(s) orally 3 times a day As needed Muscle Spasm  enalapril 20 mg oral tablet: 1 tab(s) orally 2 times a day  enoxaparin: 40 milligram(s) subcutaneous once a day  Lantus 100 units/mL subcutaneous solution: 40 unit(s) subcutaneous once a day (at bedtime)  lidocaine 4% topical film: Apply topically to affected area every 24 hours  metFORMIN 1000 mg oral tablet: 1 tab(s) orally 2 times a day  metoprolol succinate 50 mg oral tablet, extended release: 1 tab(s) orally once a day  NovoLOG 100 units/mL injectable solution: 16 unit(s) injectable 3 times a day with meals  ocular lubricant ophthalmic solution: 1 drop(s) to each affected eye 3 times a day  pantoprazole 40 mg oral delayed release tablet: 1 tab(s) orally once a day (before a meal)  polyethylene glycol 3350 oral powder for reconstitution: 17 gram(s) orally once a day  senna leaf extract oral tablet: 2 tab(s) orally once a day (at bedtime) As needed Constipation  traMADol 50 mg oral tablet: 1 tab(s) orally every 6 hours As needed Moderate Pain (4 - 6)   bisacodyl 5 mg oral delayed release tablet: 1 tab(s) orally once a day (at bedtime)  Claritin 10 mg oral tablet: 1 tab(s) orally once a day  cyclobenzaprine 5 mg oral tablet: 1 tab(s) orally 3 times a day as needed for Muscle Spasm MDD: 3  enalapril 20 mg oral tablet: 1 tab(s) orally 2 times a day  Lantus 100 units/mL subcutaneous solution: 40 unit(s) subcutaneous once a day (at bedtime)  metFORMIN 1000 mg oral tablet: 1 tab(s) orally 2 times a day  metoprolol succinate 50 mg oral tablet, extended release: 1 tab(s) orally once a day  NovoLOG 100 units/mL injectable solution: 16 unit(s) injectable 3 times a day with meals  ocular lubricant ophthalmic solution: 1 drop(s) to each affected eye 3 times a day  pantoprazole 40 mg oral delayed release tablet: 1 tab(s) orally once a day (before a meal)  polyethylene glycol 3350 oral powder for reconstitution: 17 gram(s) orally once a day  senna leaf extract oral tablet: 2 tab(s) orally once a day (at bedtime) As needed Constipation  traMADol 50 mg oral tablet: 1 tab(s) orally every 6 hours as needed for Moderate Pain (4 - 6) MDD: 4 tabs   bisacodyl 5 mg oral delayed release tablet: 1 tab(s) orally once a day (at bedtime)  Claritin 10 mg oral tablet: 1 tab(s) orally once a day  enalapril 20 mg oral tablet: 1 tab(s) orally 2 times a day  Lantus 100 units/mL subcutaneous solution: 40 unit(s) subcutaneous once a day (at bedtime)  metFORMIN 1000 mg oral tablet: 1 tab(s) orally 2 times a day  metoprolol succinate 50 mg oral tablet, extended release: 1 tab(s) orally once a day  NovoLOG 100 units/mL injectable solution: 16 unit(s) injectable 3 times a day with meals  ocular lubricant ophthalmic solution: 1 drop(s) to each affected eye 3 times a day  pantoprazole 40 mg oral delayed release tablet: 1 tab(s) orally once a day (before a meal) MDD: 1  polyethylene glycol 3350 oral powder for reconstitution: 17 gram(s) orally once a day  senna leaf extract oral tablet: 2 tab(s) orally once a day (at bedtime) As needed Constipation  tiZANidine 4 mg oral tablet: 1 tab(s) orally every 8 hours  traMADol 50 mg oral tablet: 1 tab(s) orally every 6 hours as needed for Moderate Pain (4 - 6) MDD: 4 tabs

## 2024-05-23 NOTE — DISCHARGE NOTE PROVIDER - CARE PROVIDER_API CALL
Masoud Ayers  Neurosurgery  [address] [facility]  [place] [address]  Phone: [phone]  Fax: [phone]  Follow Up Time:

## 2024-05-23 NOTE — DISCHARGE NOTE PROVIDER - HOSPITAL COURSE
Mr. Mayorga is a 73-year-old male with past medical history of diabetes, spinal stenosis complicated by neurogenic bladder with chronic Hernandez presenting for lower extremity weakess.        Weakness.   -LE weakness with new pain and anterolateral numbness L>R  -lead neg,  rpr neg,   -f/u B1, B6,  -b12 elevated  -HIV neg, TSH wnl, ck wnl  -MRI with cord impingement planned for an anterior cervical discectomy and fusion C4 to C7 on 5/29       Urine culture positive.   +UA for Nitrates, LE, and bacteria pt has chronic hernandez, asymptomatic, without fevers and afebrile  -S/P zosyn, ertapenem, ID consult appreciated, per ID started on meropenem now dc and on augmentin thru 5/26  - UCx with E faecalis, ampi sens, dc meropenem and started on augmentin, BCx neg to date, per ID suspect this colonization rather than true infection.   -of note patient with known atrophic left kidney (dx'ed in 40s per pt) - monitor renal function/trend Cr  patient with known large bladder calculus- needs to f/u with urology as outpt   with reported recurrent history of Proteus infxn  R perinephric fat stranding noted on imaging       Type 2 diabetes mellitus, with long-term current use of insulin.   At home on 40lantus and 16 novolog TID  -cont lantus 32 units and premeal 12 units TID adjust prn  -SSI.    Hypertension.    -Continue enalapril 20 BID.     CAD (coronary artery disease).    -Metop succinate 25 QD  -dc Aspirin 81 mg for sx  -S/P CABG in the past  - cards consulted for preop clearance, consult appreciated, per cards -RCRI class III 10.1% Risk for 30 day MACE c/w metoprolol increase to 50 mg po daily , optimized from a cardiac standpoint   -echo showed  Left ventricular systolic function is mildly decreased with an ejection fraction visually estimated at 45 to 50%. There are regional wall motion abnormalities present. Hypokinesis of the inferolateral wall and basal inferior wall. Normal right ventricular cavity size and normal systolic function.     Mr. Mayorga is a 73-year-old male with past medical history of diabetes, spinal stenosis complicated by neurogenic bladder with chronic Hernandez presenting for lower extremity weakess.    Weakness.   -LE weakness with new pain and anterolateral numbness L>R  -lead neg,  rpr neg,   -f/u B1, B6,  -b12 elevated  -HIV neg, TSH wnl, ck wnl  -MRI with cord impingement planned for an anterior cervical discectomy and fusion C4 to C7 on 5/29       Urine culture positive.   +UA for Nitrates, LE, and bacteria pt has chronic hernandez, asymptomatic, without fevers and afebrile  -S/P zosyn, ertapenem, ID consult appreciated, per ID started on meropenem now dc and on augmentin thru 5/26  - UCx with E faecalis, ampi sens, dc meropenem and started on augmentin, BCx neg to date, per ID suspect this colonization rather than true infection.   -of note patient with known atrophic left kidney (dx'ed in 40s per pt) - monitor renal function/trend Cr  patient with known large bladder calculus- needs to f/u with urology as outpt   with reported recurrent history of Proteus infxn  R perinephric fat stranding noted on imaging       Type 2 diabetes mellitus, with long-term current use of insulin.   At home on 40lantus and 16 novolog TID  -cont lantus 32 units and premeal 12 units TID adjust prn  -SSI.    Hypertension.    -Continue enalapril 20 BID.     CAD (coronary artery disease).    -Metop succinate 25 QD  -dc Aspirin 81 mg for sx  -S/P CABG in the past  - cards consulted for preop clearance, consult appreciated, per cards -RCRI class III 10.1% Risk for 30 day MACE c/w metoprolol increase to 50 mg po daily , optimized from a cardiac standpoint   -echo showed  Left ventricular systolic function is mildly decreased with an ejection fraction visually estimated at 45 to 50%. There are regional wall motion abnormalities present. Hypokinesis of the inferolateral wall and basal inferior wall. Normal right ventricular cavity size and normal systolic function.    Neurosurgeical course  74yo M PMHx spinal stenosis complicated by ??neurogenic bladder?? diagnosed 3 years ago in , pt says he had bladder reflux with chronic hernandez, had imaging at that time and refused surgical intervention. He has never followed in [facility] w spine surgeon. He presented with progressive weakness and pain in lower extremities since 5/15. CT cervical spine reveals high-grade bilateral C2-C3, C3-C4, C5-C6, and C6-C7 stenosis.    5/29: OR for C4-C7 ACDF  5/30: s/p C4-7 ACDF, hmv drain, pod #1, possible nonluminal esophageal injury closed with stitch, npo, ENT following, cxray done to r/o pneumomediastinum  5/31: Stable exam POD # 2 S/P  C4-7 ACDF, HMV X 1. CXR showed no pneumomediastinum. Initial evaluation done by PM&R  6/1-3: Stable exam. HMV removed. C-spine X-ray stable. awaiting acute rehab vs home w/ ooutpatient PT     Mr. Mayorga is a 73-year-old male with past medical history of diabetes, spinal stenosis complicated by neurogenic bladder with chronic Hernandez presenting for lower extremity weakess.    Weakness.   -LE weakness with new pain and anterolateral numbness L>R  -lead neg,  rpr neg,   -f/u B1, B6,  -b12 elevated  -HIV neg, TSH wnl, ck wnl  -MRI with cord impingement planned for an anterior cervical discectomy and fusion C4 to C7 on 5/29     Urine culture positive.   +UA for Nitrates, LE, and bacteria pt has chronic hernandez, asymptomatic, without fevers and afebrile  -S/P zosyn, ertapenem, ID consult appreciated, per ID started on meropenem now dc and on augmentin thru 5/26  - UCx with E faecalis, ampi sens, dc meropenem and started on augmentin, BCx neg to date, per ID suspect this colonization rather than true infection.   -of note patient with known atrophic left kidney (dx'ed in 40s per pt) - monitor renal function/trend Cr  patient with known large bladder calculus- needs to f/u with urology as outpt   with reported recurrent history of Proteus infxn  R perinephric fat stranding noted on imaging       Type 2 diabetes mellitus, with long-term current use of insulin.   At home on 40lantus and 16 novolog TID  -cont lantus 32 units and premeal 12 units TID adjust prn  -SSI.    Hypertension.    -Continue enalapril 20 BID.     CAD (coronary artery disease).    -Metop succinate 25 QD  -dc Aspirin 81 mg for sx  -S/P CABG in the past  - cards consulted for preop clearance, consult appreciated, per cards -RCRI class III 10.1% Risk for 30 day MACE c/w metoprolol increase to 50 mg po daily , optimized from a cardiac standpoint   -echo showed  Left ventricular systolic function is mildly decreased with an ejection fraction visually estimated at 45 to 50%. There are regional wall motion abnormalities present. Hypokinesis of the inferolateral wall and basal inferior wall. Normal right ventricular cavity size and normal systolic function.    Neurosurgeical course  72yo M PMHx spinal stenosis complicated by ??neurogenic bladder?? diagnosed 3 years ago in , pt says he had bladder reflux with chronic hernandez, had imaging at that time and refused surgical intervention. He has never followed in [facility] w spine surgeon. He presented with progressive weakness and pain in lower extremities since 5/15. CT cervical spine reveals high-grade bilateral C2-C3, C3-C4, C5-C6, and C6-C7 stenosis.    5/29: OR for C4-C7 ACDF  5/30: s/p C4-7 ACDF, hmv drain, pod #1, possible nonluminal esophageal injury closed with stitch, npo, ENT following, cxray done to r/o pneumomediastinum  5/31: Stable exam POD # 2 S/P  C4-7 ACDF, HMV X 1. CXR showed no pneumomediastinum. Initial evaluation done by PM&R  6/1-3: Stable exam. HMV removed. C-spine X-ray stable. awaiting acute rehab vs home w/ ooutpatient PT

## 2024-05-23 NOTE — DISCHARGE NOTE PROVIDER - NSDCCPCAREPLAN_GEN_ALL_CORE_FT
PRINCIPAL DISCHARGE DIAGNOSIS  Diagnosis: Lower extremity weakness  Assessment and Plan of Treatment: you had MRI which showed spinal cord impingement, seen by neuro surgery, scheduled for sx------------      SECONDARY DISCHARGE DIAGNOSES  Diagnosis: Urinary tract infection  Assessment and Plan of Treatment: you were gien antibiotics in the hospital, follow up with pcp

## 2024-05-24 LAB
CULTURE RESULTS: SIGNIFICANT CHANGE UP
CULTURE RESULTS: SIGNIFICANT CHANGE UP
PYRIDOXAL PHOS SERPL-MCNC: 9.7 UG/L — SIGNIFICANT CHANGE UP (ref 3.4–65.2)
SPECIMEN SOURCE: SIGNIFICANT CHANGE UP
SPECIMEN SOURCE: SIGNIFICANT CHANGE UP
VIT B1 SERPL-MCNC: 143.7 NMOL/L — SIGNIFICANT CHANGE UP (ref 66.5–200)

## 2024-05-24 PROCEDURE — 99232 SBSQ HOSP IP/OBS MODERATE 35: CPT

## 2024-05-24 RX ORDER — INSULIN LISPRO 100/ML
13 VIAL (ML) SUBCUTANEOUS
Refills: 0 | Status: DISCONTINUED | OUTPATIENT
Start: 2024-05-24 | End: 2024-05-30

## 2024-05-24 RX ORDER — CYCLOBENZAPRINE HYDROCHLORIDE 10 MG/1
5 TABLET, FILM COATED ORAL THREE TIMES A DAY
Refills: 0 | Status: DISCONTINUED | OUTPATIENT
Start: 2024-05-24 | End: 2024-06-04

## 2024-05-24 RX ORDER — INSULIN GLARGINE 100 [IU]/ML
34 INJECTION, SOLUTION SUBCUTANEOUS AT BEDTIME
Refills: 0 | Status: DISCONTINUED | OUTPATIENT
Start: 2024-05-24 | End: 2024-05-28

## 2024-05-24 RX ADMIN — Medication 12 UNIT(S): at 11:56

## 2024-05-24 RX ADMIN — Medication 12 UNIT(S): at 08:34

## 2024-05-24 RX ADMIN — Medication 1 TABLET(S): at 22:10

## 2024-05-24 RX ADMIN — Medication 2: at 08:34

## 2024-05-24 RX ADMIN — Medication 2: at 11:55

## 2024-05-24 RX ADMIN — ENOXAPARIN SODIUM 40 MILLIGRAM(S): 100 INJECTION SUBCUTANEOUS at 18:15

## 2024-05-24 RX ADMIN — Medication 20 MILLIGRAM(S): at 05:06

## 2024-05-24 RX ADMIN — LIDOCAINE 1 PATCH: 4 CREAM TOPICAL at 11:54

## 2024-05-24 RX ADMIN — Medication 20 MILLIGRAM(S): at 18:15

## 2024-05-24 RX ADMIN — LIDOCAINE 1 PATCH: 4 CREAM TOPICAL at 03:29

## 2024-05-24 RX ADMIN — Medication 13 UNIT(S): at 17:33

## 2024-05-24 RX ADMIN — Medication 1 TABLET(S): at 08:35

## 2024-05-24 RX ADMIN — Medication 250 MILLIGRAM(S): at 16:34

## 2024-05-24 RX ADMIN — CHLORHEXIDINE GLUCONATE 1 APPLICATION(S): 213 SOLUTION TOPICAL at 12:44

## 2024-05-24 RX ADMIN — INSULIN GLARGINE 34 UNIT(S): 100 INJECTION, SOLUTION SUBCUTANEOUS at 22:16

## 2024-05-24 RX ADMIN — Medication 25 MILLIGRAM(S): at 05:06

## 2024-05-24 RX ADMIN — Medication 250 MILLIGRAM(S): at 15:50

## 2024-05-24 RX ADMIN — CYCLOBENZAPRINE HYDROCHLORIDE 5 MILLIGRAM(S): 10 TABLET, FILM COATED ORAL at 14:27

## 2024-05-24 NOTE — PROGRESS NOTE ADULT - PROBLEM SELECTOR PLAN 1
LE weakness with new pain and anterolateral numbness L>R  -lead neg,  rpr neg, b1 wnl  -f/u, B6  -b12 elevated  -HIV neg, TSH wnl, ck wnl  -MRI with cord impingement planned for an anterior cervical discectomy and fusion C4 to C7 on 5/29  -rectal tone intact on ED exam, patient denies fecal incontinence or saddle anesthesia  fall precautions  -PT consulted rec PMR consult  - acetaminophen PRN, lidocaine patch daily, wants to have muscle relaxant and naproxen as he has used that in the past, added naproxen prn and flexril prn  -Neurosx consult appreciated  - cxr neg LE weakness with new pain and anterolateral numbness L>R  -lead neg,  rpr neg, b1 wnl  -f/u, B6  -b12 elevated  -HIV neg, TSH wnl, ck wnl  -MRI with cord impingement planned for an anterior cervical discectomy and fusion C4 to C7 on 5/29  -rectal tone intact on ED exam, patient denies fecal incontinence or saddle anesthesia  fall precautions  -PT consulted rec PMR consult  - acetaminophen PRN, lidocaine patch daily, wants to have muscle relaxant and naproxen as he has used that in the past, added naproxen prn and flexril prn  -Neurosx consult appreciated  - cxr neg  -pt is medically optimized for planned sx

## 2024-05-24 NOTE — PROGRESS NOTE ADULT - SUBJECTIVE AND OBJECTIVE BOX
Patient is a 73y old  Male who presents with a chief complaint of BL LE weakness (23 May 2024 16:09)      SUBJECTIVE / OVERNIGHT EVENTS: Pt seen and examined at 11:10am, no overnight events, pt reports pain in both legs R>L, also with numbness of his legs, back pain and rt buttock pain and gait instability asking for muscle relaxant and naproxen, no new issues today, no other complaints, no other new issues reported.        MEDICATIONS  (STANDING):  amoxicillin  875 milliGRAM(s)/clavulanate 1 Tablet(s) Oral two times a day  chlorhexidine 2% Cloths 1 Application(s) Topical daily  dextrose 10% Bolus 125 milliLiter(s) IV Bolus once  dextrose 5%. 1000 milliLiter(s) (100 mL/Hr) IV Continuous <Continuous>  dextrose 5%. 1000 milliLiter(s) (50 mL/Hr) IV Continuous <Continuous>  dextrose 50% Injectable 25 Gram(s) IV Push once  dextrose 50% Injectable 12.5 Gram(s) IV Push once  enalapril 20 milliGRAM(s) Oral two times a day  enoxaparin Injectable 40 milliGRAM(s) SubCutaneous every 24 hours  glucagon  Injectable 1 milliGRAM(s) IntraMuscular once  insulin glargine Injectable (LANTUS) 32 Unit(s) SubCutaneous at bedtime  insulin lispro (ADMELOG) corrective regimen sliding scale   SubCutaneous at bedtime  insulin lispro (ADMELOG) corrective regimen sliding scale   SubCutaneous three times a day before meals  insulin lispro Injectable (ADMELOG) 12 Unit(s) SubCutaneous three times a day before meals  lidocaine   4% Patch 1 Patch Transdermal every 24 hours  metoprolol succinate ER 25 milliGRAM(s) Oral daily    MEDICATIONS  (PRN):  acetaminophen     Tablet .. 650 milliGRAM(s) Oral every 6 hours PRN Temp greater or equal to 38C (100.4F), Mild Pain (1 - 3)  cyclobenzaprine 5 milliGRAM(s) Oral three times a day PRN Muscle Spasm  dextrose Oral Gel 15 Gram(s) Oral once PRN Blood Glucose LESS THAN 70 milliGRAM(s)/deciliter  melatonin 3 milliGRAM(s) Oral at bedtime PRN Insomnia  naproxen 250 milliGRAM(s) Oral every 8 hours PRN mild to moderate pain (1-6)  senna 2 Tablet(s) Oral at bedtime PRN Constipation      Vital Signs Last 24 Hrs  T(C): 36.6 (24 May 2024 10:00), Max: 36.9 (23 May 2024 18:02)  T(F): 97.9 (24 May 2024 10:00), Max: 98.5 (23 May 2024 22:30)  HR: 72 (24 May 2024 10:00) (67 - 84)  BP: 144/77 (24 May 2024 10:00) (129/74 - 144/77)  BP(mean): --  RR: 18 (24 May 2024 10:00) (18 - 18)  SpO2: 100% (24 May 2024 10:00) (96% - 100%)    Parameters below as of 24 May 2024 10:00  Patient On (Oxygen Delivery Method): room air      CAPILLARY BLOOD GLUCOSE      POCT Blood Glucose.: 208 mg/dL (24 May 2024 11:55)  POCT Blood Glucose.: 233 mg/dL (24 May 2024 08:16)  POCT Blood Glucose.: 224 mg/dL (23 May 2024 22:33)  POCT Blood Glucose.: 129 mg/dL (23 May 2024 17:24)    I&O's Summary    23 May 2024 07:01  -  24 May 2024 07:00  --------------------------------------------------------  IN: 0 mL / OUT: 1800 mL / NET: -1800 mL        PHYSICAL EXAM:  GENERAL: NAD  CHEST/LUNG: Clear to auscultation bilaterally; No wheeze  HEART: Regular rate and rhythm  ABDOMEN: Soft, Nontender, Nondistended  EXTREMITIES: no LE edema  :+hernandez  PSYCH: calm  NEUROLOGY: AAOx3  SKIN: No rashes or lesions    LABS:                        14.3   7.81  )-----------( 195      ( 23 May 2024 06:35 )             40.5     05-23    135  |  102  |  29<H>  ----------------------------<  165<H>  4.2   |  22  |  1.10    Ca    9.6      23 May 2024 06:35  Mg     2.10     05-23    TPro  6.7  /  Alb  3.9  /  TBili  0.3  /  DBili  x   /  AST  11  /  ALT  13  /  AlkPhos  38<L>  05-23          Urinalysis Basic - ( 23 May 2024 06:35 )    Color: x / Appearance: x / SG: x / pH: x  Gluc: 165 mg/dL / Ketone: x  / Bili: x / Urobili: x   Blood: x / Protein: x / Nitrite: x   Leuk Esterase: x / RBC: x / WBC x   Sq Epi: x / Non Sq Epi: x / Bacteria: x        RADIOLOGY & ADDITIONAL TESTS:    Imaging Personally Reviewed:    Consultant(s) Notes Reviewed:      Care Discussed with Consultants/Other Providers:

## 2024-05-24 NOTE — PROGRESS NOTE ADULT - SUBJECTIVE AND OBJECTIVE BOX
True Rothman MD  Interventional Cardiology / Endovascular Specialist  [place] Office : [phone] [place] [address] [place] [address]  Tel:   [place] Office : [phone] [facility] [place] [address]  Tel: [phone]    pt is lying in bed in NAD      MEDICATIONS:  enalapril 20 milliGRAM(s) Oral two times a day  enoxaparin Injectable 40 milliGRAM(s) SubCutaneous every 24 hours  metoprolol succinate ER 25 milliGRAM(s) Oral daily    amoxicillin  875 milliGRAM(s)/clavulanate 1 Tablet(s) Oral two times a day      acetaminophen     Tablet .. 650 milliGRAM(s) Oral every 6 hours PRN  cyclobenzaprine 5 milliGRAM(s) Oral three times a day PRN  melatonin 3 milliGRAM(s) Oral at bedtime PRN  naproxen 250 milliGRAM(s) Oral every 8 hours PRN    senna 2 Tablet(s) Oral at bedtime PRN    dextrose 50% Injectable 12.5 Gram(s) IV Push once  dextrose 50% Injectable 25 Gram(s) IV Push once  dextrose Oral Gel 15 Gram(s) Oral once PRN  glucagon  Injectable 1 milliGRAM(s) IntraMuscular once  insulin glargine Injectable (LANTUS) 34 Unit(s) SubCutaneous at bedtime  insulin lispro (ADMELOG) corrective regimen sliding scale   SubCutaneous at bedtime  insulin lispro (ADMELOG) corrective regimen sliding scale   SubCutaneous three times a day before meals  insulin lispro Injectable (ADMELOG) 13 Unit(s) SubCutaneous three times a day before meals    chlorhexidine 2% Cloths 1 Application(s) Topical daily  dextrose 10% Bolus 125 milliLiter(s) IV Bolus once  dextrose 5%. 1000 milliLiter(s) IV Continuous <Continuous>  dextrose 5%. 1000 milliLiter(s) IV Continuous <Continuous>  lidocaine   4% Patch 1 Patch Transdermal every 24 hours      PAST MEDICAL/SURGICAL HISTORY  PAST MEDICAL & SURGICAL HISTORY:  Diabetes      Neurogenic bladder      Chronic indwelling Salazar catheter      Hypertension      CAD (coronary artery disease)      History of appendectomy      S/P CABG (coronary artery bypass graft)          SOCIAL HISTORY: Substance Use (street drugs): ( x ) never used  (  ) other:    FAMILY HISTORY:  Family hx of lung cancer (Father)            PHYSICAL EXAM:  T(C): 36.6 (05-24-24 @ 10:00), Max: 36.9 (05-23-24 @ 18:02)  HR: 72 (05-24-24 @ 10:00) (67 - 84)  BP: 144/77 (05-24-24 @ 10:00) (129/74 - 144/77)  RR: 18 (05-24-24 @ 10:00) (18 - 18)  SpO2: 100% (05-24-24 @ 10:00) (96% - 100%)  Wt(kg): --  I&O's Summary    23 May 2024 07:01  -  24 May 2024 07:00  --------------------------------------------------------  IN: 0 mL / OUT: 1800 mL / NET: -1800 mL            GENERAL: NAD  EYES: conjunctiva and sclera clear  ENMT: No tonsillar erythema, exudates, or enlargement  Cardiovascular: Normal S1 S2, No JVD, No murmurs, No edema  Respiratory: Lungs clear to auscultation	  Gastrointestinal:  Soft, Non-tender, + BS	  Extremities: No edema                                14.3   7.81  )-----------( 195      ( 23 May 2024 06:35 )             40.5     05-23    135  |  102  |  29<H>  ----------------------------<  165<H>  4.2   |  22  |  1.10    Ca    9.6      23 May 2024 06:35  Mg     2.10     05-23    TPro  6.7  /  Alb  3.9  /  TBili  0.3  /  DBili  x   /  AST  11  /  ALT  13  /  AlkPhos  38<L>  05-23    proBNP:   Lipid Profile:   HgA1c:   TSH:     Consultant(s) Notes Reviewed:  [x ] YES  [ ] NO    Care Discussed with Consultants/Other Providers [ x] YES  [ ] NO    Imaging Personally Reviewed independently:  [x] YES  [ ] NO    All labs, radiologic studies, vitals, orders and medications list reviewed. Patient is seen and examined at bedside. Case discussed with medical team.

## 2024-05-25 PROCEDURE — 99232 SBSQ HOSP IP/OBS MODERATE 35: CPT

## 2024-05-25 RX ORDER — METOPROLOL TARTRATE 50 MG
50 TABLET ORAL DAILY
Refills: 0 | Status: DISCONTINUED | OUTPATIENT
Start: 2024-05-26 | End: 2024-06-04

## 2024-05-25 RX ADMIN — Medication 1 TABLET(S): at 21:17

## 2024-05-25 RX ADMIN — LIDOCAINE 1 PATCH: 4 CREAM TOPICAL at 12:29

## 2024-05-25 RX ADMIN — LIDOCAINE 1 PATCH: 4 CREAM TOPICAL at 19:18

## 2024-05-25 RX ADMIN — ENOXAPARIN SODIUM 40 MILLIGRAM(S): 100 INJECTION SUBCUTANEOUS at 21:18

## 2024-05-25 RX ADMIN — Medication 20 MILLIGRAM(S): at 17:28

## 2024-05-25 RX ADMIN — Medication 25 MILLIGRAM(S): at 06:28

## 2024-05-25 RX ADMIN — Medication 1: at 08:26

## 2024-05-25 RX ADMIN — Medication 1 TABLET(S): at 08:25

## 2024-05-25 RX ADMIN — Medication 13 UNIT(S): at 08:27

## 2024-05-25 RX ADMIN — INSULIN GLARGINE 34 UNIT(S): 100 INJECTION, SOLUTION SUBCUTANEOUS at 23:17

## 2024-05-25 RX ADMIN — CHLORHEXIDINE GLUCONATE 1 APPLICATION(S): 213 SOLUTION TOPICAL at 12:31

## 2024-05-25 RX ADMIN — Medication 13 UNIT(S): at 12:27

## 2024-05-25 RX ADMIN — Medication 20 MILLIGRAM(S): at 06:28

## 2024-05-25 RX ADMIN — Medication 13 UNIT(S): at 17:26

## 2024-05-25 RX ADMIN — Medication 1: at 12:27

## 2024-05-25 NOTE — PROGRESS NOTE ADULT - ASSESSMENT
Mr. Mayorga is a 73-year-old male with past medical history of diabetes, spinal stenosis complicated by neurogenic bladder with chronic Aslazar presenting for lower extremity weakess.

## 2024-05-25 NOTE — PROGRESS NOTE ADULT - PROBLEM SELECTOR PLAN 1
LE weakness with new pain and anterolateral numbness L>R  -lead neg,  rpr neg, b1 wnl  -f/u, B6  -b12 elevated  -HIV neg, TSH wnl, ck wnl  -MRI with cord impingement planned for an anterior cervical discectomy and fusion C4 to C7 on 5/29  -rectal tone intact on ED exam, patient denies fecal incontinence or saddle anesthesia  fall precautions  -PT consulted rec PMR consult  - acetaminophen PRN, lidocaine patch daily, naproxen, flexeril prn  -Neurosx consult appreciated  - cxr neg  -pt is medically optimized for planned sx, RCRI 3, 10.1% 30 day MACE, inc  metoprolol to 50mg qd

## 2024-05-25 NOTE — PROGRESS NOTE ADULT - SUBJECTIVE AND OBJECTIVE BOX
Dr. Marleny Lucia  Pager [phone]    PROGRESS NOTE:     Patient is a 73y old  Male who presents with a chief complaint of BL LE weakness (24 May 2024 14:13)      SUBJECTIVE / OVERNIGHT EVENTS: denies chest pain or sob   ADDITIONAL REVIEW OF SYSTEMS: afebrile , reports leg numbness/posterior leg pain    MEDICATIONS  (STANDING):  amoxicillin  875 milliGRAM(s)/clavulanate 1 Tablet(s) Oral two times a day  chlorhexidine 2% Cloths 1 Application(s) Topical daily  dextrose 10% Bolus 125 milliLiter(s) IV Bolus once  dextrose 5%. 1000 milliLiter(s) (100 mL/Hr) IV Continuous <Continuous>  dextrose 5%. 1000 milliLiter(s) (50 mL/Hr) IV Continuous <Continuous>  dextrose 50% Injectable 25 Gram(s) IV Push once  dextrose 50% Injectable 12.5 Gram(s) IV Push once  enalapril 20 milliGRAM(s) Oral two times a day  enoxaparin Injectable 40 milliGRAM(s) SubCutaneous every 24 hours  glucagon  Injectable 1 milliGRAM(s) IntraMuscular once  insulin glargine Injectable (LANTUS) 34 Unit(s) SubCutaneous at bedtime  insulin lispro (ADMELOG) corrective regimen sliding scale   SubCutaneous at bedtime  insulin lispro (ADMELOG) corrective regimen sliding scale   SubCutaneous three times a day before meals  insulin lispro Injectable (ADMELOG) 13 Unit(s) SubCutaneous three times a day before meals  lidocaine   4% Patch 1 Patch Transdermal every 24 hours  metoprolol succinate ER 25 milliGRAM(s) Oral daily    MEDICATIONS  (PRN):  acetaminophen     Tablet .. 650 milliGRAM(s) Oral every 6 hours PRN Temp greater or equal to 38C (100.4F), Mild Pain (1 - 3)  cyclobenzaprine 5 milliGRAM(s) Oral three times a day PRN Muscle Spasm  dextrose Oral Gel 15 Gram(s) Oral once PRN Blood Glucose LESS THAN 70 milliGRAM(s)/deciliter  melatonin 3 milliGRAM(s) Oral at bedtime PRN Insomnia  naproxen 250 milliGRAM(s) Oral every 8 hours PRN mild to moderate pain (1-6)  senna 2 Tablet(s) Oral at bedtime PRN Constipation      CAPILLARY BLOOD GLUCOSE      POCT Blood Glucose.: 163 mg/dL (25 May 2024 12:07)  POCT Blood Glucose.: 160 mg/dL (25 May 2024 08:17)  POCT Blood Glucose.: 194 mg/dL (24 May 2024 22:08)  POCT Blood Glucose.: 119 mg/dL (24 May 2024 17:19)    I&O's Summary    24 May 2024 07:01  -  25 May 2024 07:00  --------------------------------------------------------  IN: 0 mL / OUT: 3400 mL / NET: -3400 mL        PHYSICAL EXAM:  Vital Signs Last 24 Hrs  T(C): 36.6 (25 May 2024 10:00), Max: 37 (24 May 2024 14:00)  T(F): 97.9 (25 May 2024 10:00), Max: 98.6 (24 May 2024 14:00)  HR: 76 (25 May 2024 10:00) (65 - 78)  BP: 126/90 (25 May 2024 10:00) (121/71 - 150/85)  BP(mean): --  RR: 18 (25 May 2024 10:00) (16 - 18)  SpO2: 96% (25 May 2024 10:00) (95% - 100%)    Parameters below as of 25 May 2024 05:20  Patient On (Oxygen Delivery Method): room air      CONSTITUTIONAL:nad  RESPIRATORY: Normal respiratory effort; lungs are clear to auscultation bilaterally  CARDIOVASCULAR: Regular rate and rhythm, normal S1 and S2, no murmur/rub/gallop; No lower extremity edema; Peripheral pulses are 2+ bilaterally  ABDOMEN: Nontender to palpation, normoactive bowel sounds, no rebound/guarding; No hepatosplenomegaly  ; hernandez   MUSCULOSKELETAL: no clubbing or cyanosis of digits; no joint swelling or tenderness to palpation  PSYCH: A+O to person, place, and time; affect appropriate    LABS:                      RADIOLOGY & ADDITIONAL TESTS:  Results Reviewed:   Imaging Personally Reviewed:  Electrocardiogram Personally Reviewed:    COORDINATION OF CARE:  Care Discussed with Consultants/Other Providers [Y/N]:  Prior or Outpatient Records Reviewed [Y/N]:

## 2024-05-26 PROCEDURE — 99232 SBSQ HOSP IP/OBS MODERATE 35: CPT

## 2024-05-26 RX ADMIN — Medication 20 MILLIGRAM(S): at 06:27

## 2024-05-26 RX ADMIN — Medication 1 TABLET(S): at 21:15

## 2024-05-26 RX ADMIN — Medication 1 TABLET(S): at 08:49

## 2024-05-26 RX ADMIN — CHLORHEXIDINE GLUCONATE 1 APPLICATION(S): 213 SOLUTION TOPICAL at 12:52

## 2024-05-26 RX ADMIN — Medication 1: at 12:39

## 2024-05-26 RX ADMIN — LIDOCAINE 1 PATCH: 4 CREAM TOPICAL at 19:48

## 2024-05-26 RX ADMIN — LIDOCAINE 1 PATCH: 4 CREAM TOPICAL at 00:21

## 2024-05-26 RX ADMIN — Medication 13 UNIT(S): at 08:50

## 2024-05-26 RX ADMIN — Medication 1: at 08:50

## 2024-05-26 RX ADMIN — ENOXAPARIN SODIUM 40 MILLIGRAM(S): 100 INJECTION SUBCUTANEOUS at 19:25

## 2024-05-26 RX ADMIN — Medication 2: at 22:25

## 2024-05-26 RX ADMIN — Medication 13 UNIT(S): at 12:39

## 2024-05-26 RX ADMIN — Medication 13 UNIT(S): at 17:37

## 2024-05-26 RX ADMIN — Medication 50 MILLIGRAM(S): at 06:28

## 2024-05-26 RX ADMIN — INSULIN GLARGINE 34 UNIT(S): 100 INJECTION, SOLUTION SUBCUTANEOUS at 22:24

## 2024-05-26 RX ADMIN — LIDOCAINE 1 PATCH: 4 CREAM TOPICAL at 12:40

## 2024-05-26 NOTE — PROGRESS NOTE ADULT - SUBJECTIVE AND OBJECTIVE BOX
Dr. Marleny Lucia  Pager [phone]    PROGRESS NOTE:     Patient is a 73y old  Male who presents with a chief complaint of BL LE weakness (25 May 2024 13:36)      SUBJECTIVE / OVERNIGHT EVENTS: no specific complaints  ADDITIONAL REVIEW OF SYSTEMS: afebrile     MEDICATIONS  (STANDING):  amoxicillin  875 milliGRAM(s)/clavulanate 1 Tablet(s) Oral two times a day  chlorhexidine 2% Cloths 1 Application(s) Topical daily  dextrose 10% Bolus 125 milliLiter(s) IV Bolus once  dextrose 5%. 1000 milliLiter(s) (100 mL/Hr) IV Continuous <Continuous>  dextrose 5%. 1000 milliLiter(s) (50 mL/Hr) IV Continuous <Continuous>  dextrose 50% Injectable 25 Gram(s) IV Push once  dextrose 50% Injectable 12.5 Gram(s) IV Push once  enalapril 20 milliGRAM(s) Oral two times a day  enoxaparin Injectable 40 milliGRAM(s) SubCutaneous every 24 hours  glucagon  Injectable 1 milliGRAM(s) IntraMuscular once  insulin glargine Injectable (LANTUS) 34 Unit(s) SubCutaneous at bedtime  insulin lispro (ADMELOG) corrective regimen sliding scale   SubCutaneous at bedtime  insulin lispro (ADMELOG) corrective regimen sliding scale   SubCutaneous three times a day before meals  insulin lispro Injectable (ADMELOG) 13 Unit(s) SubCutaneous three times a day before meals  lidocaine   4% Patch 1 Patch Transdermal every 24 hours  metoprolol succinate ER 50 milliGRAM(s) Oral daily    MEDICATIONS  (PRN):  acetaminophen     Tablet .. 650 milliGRAM(s) Oral every 6 hours PRN Temp greater or equal to 38C (100.4F), Mild Pain (1 - 3)  cyclobenzaprine 5 milliGRAM(s) Oral three times a day PRN Muscle Spasm  dextrose Oral Gel 15 Gram(s) Oral once PRN Blood Glucose LESS THAN 70 milliGRAM(s)/deciliter  melatonin 3 milliGRAM(s) Oral at bedtime PRN Insomnia  naproxen 250 milliGRAM(s) Oral every 8 hours PRN mild to moderate pain (1-6)  senna 2 Tablet(s) Oral at bedtime PRN Constipation      CAPILLARY BLOOD GLUCOSE      POCT Blood Glucose.: 165 mg/dL (26 May 2024 11:54)  POCT Blood Glucose.: 163 mg/dL (26 May 2024 08:18)  POCT Blood Glucose.: 189 mg/dL (25 May 2024 22:33)  POCT Blood Glucose.: 150 mg/dL (25 May 2024 17:16)    I&O's Summary    25 May 2024 07:01  -  26 May 2024 07:00  --------------------------------------------------------  IN: 0 mL / OUT: 700 mL / NET: -700 mL        PHYSICAL EXAM:  Vital Signs Last 24 Hrs  T(C): 36.7 (26 May 2024 11:00), Max: 36.8 (25 May 2024 22:31)  T(F): 98.1 (26 May 2024 11:00), Max: 98.2 (25 May 2024 22:31)  HR: 63 (26 May 2024 11:00) (61 - 73)  BP: 140/88 (26 May 2024 11:00) (140/88 - 155/85)  BP(mean): --  RR: 18 (26 May 2024 11:00) (17 - 18)  SpO2: 98% (26 May 2024 11:00) (95% - 98%)    Parameters below as of 26 May 2024 06:27  Patient On (Oxygen Delivery Method): room air      CONSTITUTIONAL:nad  RESPIRATORY: Normal respiratory effort; lungs are clear to auscultation bilaterally  CARDIOVASCULAR: Regular rate and rhythm, normal S1 and S2, no murmur/rub/gallop; No lower extremity edema; Peripheral pulses are 2+ bilaterally  ABDOMEN: Nontender to palpation, normoactive bowel sounds, no rebound/guarding; No hepatosplenomegaly  ; hernandez   MUSCULOSKELETAL: no clubbing or cyanosis of digits; no joint swelling or tenderness to palpation  PSYCH: A+O to person, place, and time; affect appropriate    LABS:                      RADIOLOGY & ADDITIONAL TESTS:  Results Reviewed:   Imaging Personally Reviewed:  Electrocardiogram Personally Reviewed:    COORDINATION OF CARE:  Care Discussed with Consultants/Other Providers [Y/N]:  Prior or Outpatient Records Reviewed [Y/N]:

## 2024-05-27 PROCEDURE — 99232 SBSQ HOSP IP/OBS MODERATE 35: CPT

## 2024-05-27 RX ADMIN — Medication 20 MILLIGRAM(S): at 17:28

## 2024-05-27 RX ADMIN — Medication 50 MILLIGRAM(S): at 05:39

## 2024-05-27 RX ADMIN — Medication 13 UNIT(S): at 17:28

## 2024-05-27 RX ADMIN — ENOXAPARIN SODIUM 40 MILLIGRAM(S): 100 INJECTION SUBCUTANEOUS at 22:33

## 2024-05-27 RX ADMIN — Medication 1: at 08:35

## 2024-05-27 RX ADMIN — LIDOCAINE 1 PATCH: 4 CREAM TOPICAL at 00:33

## 2024-05-27 RX ADMIN — LIDOCAINE 1 PATCH: 4 CREAM TOPICAL at 12:17

## 2024-05-27 RX ADMIN — CHLORHEXIDINE GLUCONATE 1 APPLICATION(S): 213 SOLUTION TOPICAL at 12:17

## 2024-05-27 RX ADMIN — Medication 1 DROP(S): at 22:35

## 2024-05-27 RX ADMIN — Medication 13 UNIT(S): at 12:18

## 2024-05-27 RX ADMIN — INSULIN GLARGINE 34 UNIT(S): 100 INJECTION, SOLUTION SUBCUTANEOUS at 22:33

## 2024-05-27 RX ADMIN — Medication 2: at 12:18

## 2024-05-27 RX ADMIN — Medication 13 UNIT(S): at 08:35

## 2024-05-27 RX ADMIN — LIDOCAINE 1 PATCH: 4 CREAM TOPICAL at 19:37

## 2024-05-27 RX ADMIN — Medication 1 DROP(S): at 14:45

## 2024-05-27 NOTE — PROGRESS NOTE ADULT - SUBJECTIVE AND OBJECTIVE BOX
Dr. Marleny Lucia  Pager [phone]    PROGRESS NOTE:     Patient is a 73y old  Male who presents with a chief complaint of BL LE weakness (26 May 2024 13:19)      SUBJECTIVE / OVERNIGHT EVENTS: no new complaints  ADDITIONAL REVIEW OF SYSTEMS: afebrile     MEDICATIONS  (STANDING):  artificial  tears Solution 1 Drop(s) Both EYES three times a day  chlorhexidine 2% Cloths 1 Application(s) Topical daily  dextrose 10% Bolus 125 milliLiter(s) IV Bolus once  dextrose 5%. 1000 milliLiter(s) (50 mL/Hr) IV Continuous <Continuous>  dextrose 5%. 1000 milliLiter(s) (100 mL/Hr) IV Continuous <Continuous>  dextrose 50% Injectable 12.5 Gram(s) IV Push once  dextrose 50% Injectable 25 Gram(s) IV Push once  enalapril 20 milliGRAM(s) Oral two times a day  enoxaparin Injectable 40 milliGRAM(s) SubCutaneous every 24 hours  glucagon  Injectable 1 milliGRAM(s) IntraMuscular once  insulin glargine Injectable (LANTUS) 34 Unit(s) SubCutaneous at bedtime  insulin lispro (ADMELOG) corrective regimen sliding scale   SubCutaneous three times a day before meals  insulin lispro (ADMELOG) corrective regimen sliding scale   SubCutaneous at bedtime  insulin lispro Injectable (ADMELOG) 13 Unit(s) SubCutaneous three times a day before meals  lidocaine   4% Patch 1 Patch Transdermal every 24 hours  metoprolol succinate ER 50 milliGRAM(s) Oral daily    MEDICATIONS  (PRN):  acetaminophen     Tablet .. 650 milliGRAM(s) Oral every 6 hours PRN Temp greater or equal to 38C (100.4F), Mild Pain (1 - 3)  cyclobenzaprine 5 milliGRAM(s) Oral three times a day PRN Muscle Spasm  dextrose Oral Gel 15 Gram(s) Oral once PRN Blood Glucose LESS THAN 70 milliGRAM(s)/deciliter  melatonin 3 milliGRAM(s) Oral at bedtime PRN Insomnia  naproxen 250 milliGRAM(s) Oral every 8 hours PRN mild to moderate pain (1-6)  senna 2 Tablet(s) Oral at bedtime PRN Constipation      CAPILLARY BLOOD GLUCOSE      POCT Blood Glucose.: 222 mg/dL (27 May 2024 12:03)  POCT Blood Glucose.: 192 mg/dL (27 May 2024 08:18)  POCT Blood Glucose.: 331 mg/dL (26 May 2024 22:01)  POCT Blood Glucose.: 118 mg/dL (26 May 2024 16:58)    I&O's Summary    26 May 2024 07:01  -  27 May 2024 07:00  --------------------------------------------------------  IN: 0 mL / OUT: 1700 mL / NET: -1700 mL        PHYSICAL EXAM:  Vital Signs Last 24 Hrs  T(C): 37.1 (27 May 2024 05:11), Max: 37.1 (26 May 2024 22:00)  T(F): 98.8 (27 May 2024 05:11), Max: 98.8 (26 May 2024 22:00)  HR: 74 (27 May 2024 05:11) (60 - 75)  BP: 147/70 (27 May 2024 05:11) (140/85 - 147/70)  BP(mean): --  RR: 18 (27 May 2024 05:11) (18 - 18)  SpO2: 100% (27 May 2024 05:11) (98% - 100%)    Parameters below as of 27 May 2024 05:11  Patient On (Oxygen Delivery Method): room air        CONSTITUTIONAL:nad  RESPIRATORY: Normal respiratory effort; lungs are clear to auscultation bilaterally  CARDIOVASCULAR: Regular rate and rhythm, normal S1 and S2, no murmur/rub/gallop; No lower extremity edema; Peripheral pulses are 2+ bilaterally  ABDOMEN: Nontender to palpation, normoactive bowel sounds, no rebound/guarding; No hepatosplenomegaly  ; hernandez in place  MUSCULOSKELETAL: no clubbing or cyanosis of digits; no joint swelling or tenderness to palpation  PSYCH: A+O to person, place, and time; affect appropriate  LABS:                      RADIOLOGY & ADDITIONAL TESTS:  Results Reviewed:   Imaging Personally Reviewed:  Electrocardiogram Personally Reviewed:    COORDINATION OF CARE:  Care Discussed with Consultants/Other Providers [Y/N]:  Prior or Outpatient Records Reviewed [Y/N]:

## 2024-05-28 ENCOUNTER — TRANSCRIPTION ENCOUNTER (OUTPATIENT)
Age: 74
End: 2024-05-28

## 2024-05-28 DIAGNOSIS — M50.30 OTHER CERVICAL DISC DEGENERATION, UNSPECIFIED CERVICAL REGION: ICD-10-CM

## 2024-05-28 LAB
ANION GAP SERPL CALC-SCNC: 14 MMOL/L — SIGNIFICANT CHANGE UP (ref 7–14)
APTT BLD: 36.6 SEC — HIGH (ref 24.5–35.6)
BLD GP AB SCN SERPL QL: NEGATIVE — SIGNIFICANT CHANGE UP
BUN SERPL-MCNC: 27 MG/DL — HIGH (ref 7–23)
CALCIUM SERPL-MCNC: 10 MG/DL — SIGNIFICANT CHANGE UP (ref 8.4–10.5)
CHLORIDE SERPL-SCNC: 103 MMOL/L — SIGNIFICANT CHANGE UP (ref 98–107)
CO2 SERPL-SCNC: 20 MMOL/L — LOW (ref 22–31)
CREAT SERPL-MCNC: 1.11 MG/DL — SIGNIFICANT CHANGE UP (ref 0.5–1.3)
EGFR: 71 ML/MIN/1.73M2 — SIGNIFICANT CHANGE UP
GLUCOSE BLDC GLUCOMTR-MCNC: 178 MG/DL — HIGH (ref 70–99)
GLUCOSE BLDC GLUCOMTR-MCNC: 215 MG/DL — HIGH (ref 70–99)
GLUCOSE BLDC GLUCOMTR-MCNC: 228 MG/DL — HIGH (ref 70–99)
GLUCOSE SERPL-MCNC: 155 MG/DL — HIGH (ref 70–99)
HCT VFR BLD CALC: 41.5 % — SIGNIFICANT CHANGE UP (ref 39–50)
HGB BLD-MCNC: 14.8 G/DL — SIGNIFICANT CHANGE UP (ref 13–17)
INR BLD: 0.95 RATIO — SIGNIFICANT CHANGE UP (ref 0.85–1.18)
MAGNESIUM SERPL-MCNC: 2.2 MG/DL — SIGNIFICANT CHANGE UP (ref 1.6–2.6)
MCHC RBC-ENTMCNC: 31.4 PG — SIGNIFICANT CHANGE UP (ref 27–34)
MCHC RBC-ENTMCNC: 35.7 GM/DL — SIGNIFICANT CHANGE UP (ref 32–36)
MCV RBC AUTO: 88.1 FL — SIGNIFICANT CHANGE UP (ref 80–100)
NRBC # BLD: 0 /100 WBCS — SIGNIFICANT CHANGE UP (ref 0–0)
NRBC # FLD: 0 K/UL — SIGNIFICANT CHANGE UP (ref 0–0)
PHOSPHATE SERPL-MCNC: 3.4 MG/DL — SIGNIFICANT CHANGE UP (ref 2.5–4.5)
PLATELET # BLD AUTO: 196 K/UL — SIGNIFICANT CHANGE UP (ref 150–400)
POTASSIUM SERPL-MCNC: 4.1 MMOL/L — SIGNIFICANT CHANGE UP (ref 3.5–5.3)
POTASSIUM SERPL-SCNC: 4.1 MMOL/L — SIGNIFICANT CHANGE UP (ref 3.5–5.3)
PROTHROM AB SERPL-ACNC: 10.8 SEC — SIGNIFICANT CHANGE UP (ref 9.5–13)
RBC # BLD: 4.71 M/UL — SIGNIFICANT CHANGE UP (ref 4.2–5.8)
RBC # FLD: 12.3 % — SIGNIFICANT CHANGE UP (ref 10.3–14.5)
RH IG SCN BLD-IMP: POSITIVE — SIGNIFICANT CHANGE UP
SODIUM SERPL-SCNC: 137 MMOL/L — SIGNIFICANT CHANGE UP (ref 135–145)
WBC # BLD: 6.44 K/UL — SIGNIFICANT CHANGE UP (ref 3.8–10.5)
WBC # FLD AUTO: 6.44 K/UL — SIGNIFICANT CHANGE UP (ref 3.8–10.5)

## 2024-05-28 PROCEDURE — 99232 SBSQ HOSP IP/OBS MODERATE 35: CPT

## 2024-05-28 RX ORDER — INSULIN GLARGINE 100 [IU]/ML
27 INJECTION, SOLUTION SUBCUTANEOUS AT BEDTIME
Refills: 0 | Status: DISCONTINUED | OUTPATIENT
Start: 2024-05-28 | End: 2024-05-29

## 2024-05-28 RX ADMIN — Medication 2: at 12:51

## 2024-05-28 RX ADMIN — Medication 1 DROP(S): at 22:16

## 2024-05-28 RX ADMIN — LIDOCAINE 1 PATCH: 4 CREAM TOPICAL at 14:05

## 2024-05-28 RX ADMIN — LIDOCAINE 1 PATCH: 4 CREAM TOPICAL at 19:20

## 2024-05-28 RX ADMIN — Medication 13 UNIT(S): at 08:29

## 2024-05-28 RX ADMIN — CHLORHEXIDINE GLUCONATE 1 APPLICATION(S): 213 SOLUTION TOPICAL at 13:50

## 2024-05-28 RX ADMIN — Medication 13 UNIT(S): at 12:52

## 2024-05-28 RX ADMIN — INSULIN GLARGINE 27 UNIT(S): 100 INJECTION, SOLUTION SUBCUTANEOUS at 22:17

## 2024-05-28 RX ADMIN — Medication 1: at 08:29

## 2024-05-28 RX ADMIN — Medication 0: at 22:17

## 2024-05-28 RX ADMIN — Medication 13 UNIT(S): at 18:13

## 2024-05-28 RX ADMIN — Medication 1 DROP(S): at 06:11

## 2024-05-28 RX ADMIN — LIDOCAINE 1 PATCH: 4 CREAM TOPICAL at 00:00

## 2024-05-28 RX ADMIN — LIDOCAINE 1 PATCH: 4 CREAM TOPICAL at 07:18

## 2024-05-28 RX ADMIN — Medication 1: at 18:13

## 2024-05-28 RX ADMIN — Medication 20 MILLIGRAM(S): at 18:12

## 2024-05-28 NOTE — DIETITIAN INITIAL EVALUATION ADULT - OTHER INFO
Per chart review, Mr. Mayorga is a 73-year-old male with past medical history of diabetes, spinal stenosis complicated by neurogenic bladder with chronic Salazar presenting for lower extremity weakness.     Patient seen at bedside. Reports his appetite remains good in hospital. Pt noted currently NPO in placed scheduled for OR procedure tomorrow as per chart review. Pt was on Consistent Carbohydrate Gestational w/ 3 snacks diet. Recommend once diet resumed change diet to Consistent Carbohydrate diet. Denies chewing and swallowing difficulties. Pt denies any GI distress (nausea/vomiting/diarrhea/constipation.) However, noted pt's LBM 5/25 as per RN flow sheet. Pt with chronic constipation as reported by patient. Recommend cont. to provide bowel regimen PRN. Pt's labs notable with A1C 8.1%, POCT ranging from 163-331H, insulin regimen ordered. Unable to provide nutrition education as pt declined. Pt states " I'm the expert of my own diet, I know what to do". Pt made aware RD remains available as needed.

## 2024-05-28 NOTE — DIETITIAN INITIAL EVALUATION ADULT - NS FNS DIET ORDER
Diet, NPO:   NPO for Procedure/Test     NPO Start Date: 28-May-2024,   NPO Start Time: 23:00  Except Medications (05-27-24 @ 10:16) [Active]  Diet, Consistent Carbohydrate Gestational w/3 Snacks (05-20-24 @ 01:32) [Active]

## 2024-05-28 NOTE — DIETITIAN INITIAL EVALUATION ADULT - ORAL INTAKE PTA/DIET HISTORY
Patient reports his appetite has been good in general. Pt reported was monitoring his FS for a week as told by his Endocrinologist. However, pt unable to recall his FS results were. Pt states " I watch my carb intake". However, when asked specific Carb items from his diet. Pt states" I don't religiously follow any diet, my body tells me what to eat".  Pt reports has chronic constipation, usually drinks Senna Tea which has been effective for him. Pt reports his UBW 160lbs, pt endorses weight gain w/o specific time frame.

## 2024-05-28 NOTE — PROGRESS NOTE ADULT - ASSESSMENT
Mr. Mayorga is a 73-year-old male with past medical history of diabetes, spinal stenosis complicated by neurogenic bladder with chronic Salazar presenting for lower extremity weakness.

## 2024-05-28 NOTE — PROGRESS NOTE ADULT - PROBLEM SELECTOR PLAN 1
LE weakness with new pain and anterolateral numbness L>R  -MRI with cord impingement planned for an anterior cervical discectomy and fusion C4 to C7 on 5/29  -rectal tone intact on ED exam, patient denies fecal incontinence or saddle anesthesia  fall precautions  -PT consulted rec PMR consult, would obtain post op  - acetaminophen PRN, lidocaine patch daily, naproxen, flexeril prn  -Neurosx consult appreciated  - cxr neg  -pt is medically optimized for planned sx, RCRI 3, 10.1% 30 day MACE, inc  metoprolol to 50mg qd

## 2024-05-28 NOTE — PROGRESS NOTE ADULT - SUBJECTIVE AND OBJECTIVE BOX
True Rothman MD  Interventional Cardiology / Advance Heart Failure and Cardiac Transplant Specialist  [place] Office : [address] [place] [address] [place]. [address]  Tel:   [place] Office : [address] [facility] [place] [address]  Tel: [phone]       Pt is lying in bed comfortable not in distress, no chest pains no SOB no palpitations  	  MEDICATIONS:  enalapril 20 milliGRAM(s) Oral two times a day  metoprolol succinate ER 50 milliGRAM(s) Oral daily  acetaminophen     Tablet .. 650 milliGRAM(s) Oral every 6 hours PRN  cyclobenzaprine 5 milliGRAM(s) Oral three times a day PRN  melatonin 3 milliGRAM(s) Oral at bedtime PRN  naproxen 250 milliGRAM(s) Oral every 8 hours PRN  senna 2 Tablet(s) Oral at bedtime PRN  dextrose 50% Injectable 12.5 Gram(s) IV Push once  dextrose 50% Injectable 25 Gram(s) IV Push once  dextrose Oral Gel 15 Gram(s) Oral once PRN  glucagon  Injectable 1 milliGRAM(s) IntraMuscular once  insulin glargine Injectable (LANTUS) 27 Unit(s) SubCutaneous at bedtime  insulin lispro (ADMELOG) corrective regimen sliding scale   SubCutaneous at bedtime  insulin lispro (ADMELOG) corrective regimen sliding scale   SubCutaneous three times a day before meals  insulin lispro Injectable (ADMELOG) 13 Unit(s) SubCutaneous three times a day before meals  artificial  tears Solution 1 Drop(s) Both EYES three times a day  chlorhexidine 2% Cloths 1 Application(s) Topical daily  dextrose 10% Bolus 125 milliLiter(s) IV Bolus once  dextrose 5%. 1000 milliLiter(s) IV Continuous <Continuous>  dextrose 5%. 1000 milliLiter(s) IV Continuous <Continuous>  lidocaine   4% Patch 1 Patch Transdermal every 24 hours      PAST MEDICAL/SURGICAL HISTORY  PAST MEDICAL & SURGICAL HISTORY:  Diabetes      Neurogenic bladder      Chronic indwelling Salazar catheter      Hypertension      CAD (coronary artery disease)      History of appendectomy      S/P CABG (coronary artery bypass graft)          SOCIAL HISTORY: Substance Use (street drugs): ( x ) never used  (  ) other:    FAMILY HISTORY:  Family hx of lung cancer (Father)      PHYSICAL EXAM:  T(C): 36.7 (05-28-24 @ 14:10), Max: 36.7 (05-27-24 @ 21:34)  HR: 74 (05-28-24 @ 18:11) (57 - 75)  BP: 137/81 (05-28-24 @ 18:11) (104/57 - 165/83)  RR: 18 (05-28-24 @ 18:11) (17 - 18)  SpO2: 99% (05-28-24 @ 18:11) (96% - 100%)  Wt(kg): --  I&O's Summary    27 May 2024 07:01  -  28 May 2024 07:00  --------------------------------------------------------  IN: 0 mL / OUT: 1500 mL / NET: -1500 mL    28 May 2024 07:01  -  28 May 2024 20:10  --------------------------------------------------------  IN: 0 mL / OUT: 700 mL / NET: -700 mL          GENERAL: NAD  EYES:   PERRLA   ENMT:   Moist mucous membranes, Good dentition, No lesions  Cardiovascular: Normal S1 S2, No JVD, No murmurs, No edema  Respiratory: Lungs clear to auscultation	  Gastrointestinal:  Soft, Non-tender, + BS	  Extremities: no edema                                    14.8   6.44  )-----------( 196      ( 28 May 2024 06:45 )             41.5     05-28    137  |  103  |  27<H>  ----------------------------<  155<H>  4.1   |  20<L>  |  1.11    Ca    10.0      28 May 2024 06:45  Phos  3.4     05-28  Mg     2.20     05-28      proBNP:   Lipid Profile:   HgA1c:   TSH:     Consultant(s) Notes Reviewed:  [x ] YES  [ ] NO    Care Discussed with Consultants/Other Providers [ x] YES  [ ] NO    Imaging Personally Reviewed independently:  [x] YES  [ ] NO    All labs, radiologic studies, vitals, orders and medications list reviewed. Patient is seen and examined at bedside. Case discussed with medical team.

## 2024-05-28 NOTE — PROGRESS NOTE ADULT - SUBJECTIVE AND OBJECTIVE BOX
Patient scheduled for ACDF C4-7 tomorrow under general anesthesia.    HPI:  Mr. Mayorga is a 73-year-old male with past medical history of IDDM2, spinal stenosis complicated by neurogenic bladder with chronic Salazar, HTN presenting for lower extremity weakness, per pt there is always component of baseline weakness as pt has used a cane to walk in the past. This last week pt reported struggling to ambulate due to pain going down b/l lateral legs along with numbness on anterolateral surface of leg. Pt reported taking tylenol and Aleve for these symptoms but there was no symptom improvement. Pt reports having to use furniture and a walker to ambulate. Pt denies fevers, chills, chest pain, dysuria, saddle anesthesia or fecal incontinence Pt has chronic hx of urinary incontinence in the context of spinal stenosis. Pt was previous found to be ESBL + in early may (patient reports history of Proteus infection in December 2023 s/p abx with recurrence shortly thereafter).   In the ED VS notable for BP of 160/80. In the ED labs  notable for  and UA + for LE, nitrates, and bacteria. Imaging was notable for cervical stenosis and lordosis and thoracolumbar spine spondylosis.  In the ED pt received Zosyn and 0.5L NS.  (19 May 2024 21:20)    PAST MEDICAL & SURGICAL HISTORY:  Diabetes  Neurogenic bladder  Chronic indwelling Salazar catheter  Hypertension  CAD (coronary artery disease)  History of appendectomy  S/P CABG (coronary artery bypass graft)    No Known Allergies    T(C): 36.5 (05-28-24 @ 06:15), Max: 36.8 (05-27-24 @ 11:00)  HR: 57 (05-28-24 @ 06:15) (57 - 77)  BP: 104/57 (05-28-24 @ 06:15) (104/57 - 165/83)  RR: 18 (05-28-24 @ 06:15) (16 - 18)  SpO2: 100% (05-28-24 @ 06:15) (96% - 100%)  Wt(kg): --    05-28    137  |  103  |  27<H>  ----------------------------<  155<H>  4.1   |  20<L>  |  1.11    Ca    10.0      28 May 2024 06:45  Phos  3.4     05-28  Mg     2.20     05-28      CBC Full  -  ( 28 May 2024 06:45 )  WBC Count : 6.44 K/uL  RBC Count : 4.71 M/uL  Hemoglobin : 14.8 g/dL  Hematocrit : 41.5 %  Platelet Count - Automated : 196 K/uL  Mean Cell Volume : 88.1 fL  Mean Cell Hemoglobin : 31.4 pg  Mean Cell Hemoglobin Concentration : 35.7 gm/dL  Auto Neutrophil # : x  Auto Lymphocyte # : x  Auto Monocyte # : x  Auto Eosinophil # : x  Auto Basophil # : x  Auto Neutrophil % : x  Auto Lymphocyte % : x  Auto Monocyte % : x  Auto Eosinophil % : x  Auto Basophil % : x    PT/INR - ( 28 May 2024 06:45 )   PT: 10.8 sec;   INR: 0.95 ratio    PTT - ( 28 May 2024 06:45 )  PTT:36.6 sec    Pregnancy test: N/A  Type & Screen (in past 72hrs): in BB    Medical clearance: cardiac and medical in chart  Anticoagulants: d/c SQL tonight  Consent: pending

## 2024-05-28 NOTE — DIETITIAN INITIAL EVALUATION ADULT - CONTINUE CURRENT NUTRITION CARE PLAN
Recommend when medically feasible, resume diet to Consistent Carbohydrate diet with evening snack instead of Consistent Carbohydrate Gestational with 3 snacks.

## 2024-05-28 NOTE — DIETITIAN INITIAL EVALUATION ADULT - ADD RECOMMEND
1) Recommend when medically feasible, resume diet to Consistent Carbohydrate diet with evening snack instead of Consistent Carbohydrate Gestational with 3 snacks.     2) Monitor PO intake, Labs, weights, BMs, and skin integrity.   3) Recommend cont. to provide bowel regimen PRN to promote bowel regularity.   4) RD to remain available for further nutritional interventions as indicated.

## 2024-05-28 NOTE — DIETITIAN INITIAL EVALUATION ADULT - PROBLEM SELECTOR PLAN 1
LE weakness with new pain and anterolateral numbness L>R  Heavy metal testing, HIV, TSH, RPR, , B1, B6, B12, CK  -MRI evaluation for cord compression given worsening LE weakness with new anterolateral b/l numbness.  -rectal tone intact on ED exam, patient denies fecal incontinence or saddle anesthesia  fall precautions  PT consult  lidocaine and acetaminophen PRN, ordered tramadol 25mg x1 PRN severe pain (not standing as patient reports it has caused drop in his BP in the past however notes BP has been increased of recent, also due to report of chronic constipation), monitor BP and maintain hold parameters for BP meds.  reports having taken Myoflex gel, chlorzoxazone and Tramadol in the past for pain (iSTOP Reference #: [id number], negative, however patient receives some care in )

## 2024-05-28 NOTE — DIETITIAN INITIAL EVALUATION ADULT - PERTINENT MEDS FT
MEDICATIONS  (STANDING):  artificial  tears Solution 1 Drop(s) Both EYES three times a day  chlorhexidine 2% Cloths 1 Application(s) Topical daily  dextrose 10% Bolus 125 milliLiter(s) IV Bolus once  dextrose 5%. 1000 milliLiter(s) (50 mL/Hr) IV Continuous <Continuous>  dextrose 5%. 1000 milliLiter(s) (100 mL/Hr) IV Continuous <Continuous>  dextrose 50% Injectable 12.5 Gram(s) IV Push once  dextrose 50% Injectable 25 Gram(s) IV Push once  enalapril 20 milliGRAM(s) Oral two times a day  glucagon  Injectable 1 milliGRAM(s) IntraMuscular once  insulin glargine Injectable (LANTUS) 27 Unit(s) SubCutaneous at bedtime  insulin lispro (ADMELOG) corrective regimen sliding scale   SubCutaneous three times a day before meals  insulin lispro (ADMELOG) corrective regimen sliding scale   SubCutaneous at bedtime  insulin lispro Injectable (ADMELOG) 13 Unit(s) SubCutaneous three times a day before meals  lidocaine   4% Patch 1 Patch Transdermal every 24 hours  metoprolol succinate ER 50 milliGRAM(s) Oral daily    MEDICATIONS  (PRN):  acetaminophen     Tablet .. 650 milliGRAM(s) Oral every 6 hours PRN Temp greater or equal to 38C (100.4F), Mild Pain (1 - 3)  cyclobenzaprine 5 milliGRAM(s) Oral three times a day PRN Muscle Spasm  dextrose Oral Gel 15 Gram(s) Oral once PRN Blood Glucose LESS THAN 70 milliGRAM(s)/deciliter  melatonin 3 milliGRAM(s) Oral at bedtime PRN Insomnia  naproxen 250 milliGRAM(s) Oral every 8 hours PRN mild to moderate pain (1-6)  senna 2 Tablet(s) Oral at bedtime PRN Constipation

## 2024-05-28 NOTE — DIETITIAN INITIAL EVALUATION ADULT - PERTINENT LABORATORY DATA
05-28    137  |  103  |  27<H>  ----------------------------<  155<H>  4.1   |  20<L>  |  1.11    Ca    10.0      28 May 2024 06:45  Phos  3.4     05-28  Mg     2.20     05-28    POCT Blood Glucose.: 228 mg/dL (05-28-24 @ 11:56)  A1C with Estimated Average Glucose Result: 8.0 % (05-20-24 @ 13:46)

## 2024-05-28 NOTE — DIETITIAN INITIAL EVALUATION ADULT - PROBLEM SELECTOR PLAN 2
-+UA for Nitrates, LE, and bacteria pt has chronic hernandez, asymptomatic, without fevers and afebrile  -S/P zosyn, currently on ertapenem will continue pending ID recs (consulted by ED), f/u recs  -f/u repeat UCx, BCx  -of note patient with known atrophic left kidney (dx'ed in 40s per pt) - monitor renal function/trend Cr    patient with known large bladder calculus   with reported recurrent history of Proteus infxn  R perinephric fat stranding noted on imaging  denies fevers/chills

## 2024-05-28 NOTE — DIETITIAN INITIAL EVALUATION ADULT - PROBLEM SELECTOR PLAN 6
Diet: Consistent carb  DVT-P: Lovenox pending MR imaging  Dispo: pending medical optimization, patient is requesting copies of imaging/labs prior to discharge    #constipation  senna PRN  avoid Miralax per patient request

## 2024-05-28 NOTE — PROGRESS NOTE ADULT - ASSESSMENT
EKG not in chart plz obtain     Echo < from: TTE W or WO Ultrasound Enhancing Agent (05.22.24 @ 10:35) >   1. The left ventricular cavity is normal in size. Left ventricular wall thickness is normal. Left ventricular systolic function is mildly decreased with an ejection fraction visually estimated at 45 to 50%. There are regional wall motion abnormalities present. Hypokinesis of the inferolateral wall and basal inferior wall.   2. Normal right ventricular cavity size and normal systolic function.   3. Structurally normal mitral valve with normal leaflet excursion. There is calcification of the mitral valve annulus. There is trace mitral regurgitation.   4. The aortic valve appears trileaflet with normal systolic excursion. There is calcification of the aortic valve leaflets. There is mild aortic regurgitation.    < end of copied text >    Assessment and Plan     1) CAD s/p CABG : denies CP SOB with > 4 mets activity , should be in asa and lipitor if not contraindicated     2) Pre op eval Planned for anterior cervical discectomy and fusion C4 to C7, denies CP and SOB with > 4 mets of activity , RCRI class III 10.1% Risk for 30 day MACE c/w metoprolol  50 mg po daily , optimized from a cardiac standpoint     3) DVT PPX lovenox

## 2024-05-28 NOTE — PROGRESS NOTE ADULT - PROBLEM SELECTOR PLAN 1
- NPO after midnight   - IVF  - d/c DVT prophylaxis and hold all blood thinners  - consent for surgery

## 2024-05-28 NOTE — DIETITIAN INITIAL EVALUATION ADULT - NSICDXPASTMEDICALHX_GEN_ALL_CORE_FT
PAST MEDICAL HISTORY:  CAD (coronary artery disease)     Chronic indwelling Salazar catheter     Diabetes     Hypertension     Neurogenic bladder

## 2024-05-28 NOTE — PROGRESS NOTE ADULT - SUBJECTIVE AND OBJECTIVE BOX
[facility] Division of Hospital Medicine  Dyana Heath MD  Pager [phone]    Patient is a 73y old  Male who presents with a chief complaint of BL LE weakness       SUBJECTIVE / OVERNIGHT EVENTS: feeling well; no new complaints; hopeful that surgery will help with his weakness; he is eager to get stronger      MEDICATIONS  (STANDING):  artificial  tears Solution 1 Drop(s) Both EYES three times a day  chlorhexidine 2% Cloths 1 Application(s) Topical daily  dextrose 10% Bolus 125 milliLiter(s) IV Bolus once  dextrose 5%. 1000 milliLiter(s) (100 mL/Hr) IV Continuous <Continuous>  dextrose 5%. 1000 milliLiter(s) (50 mL/Hr) IV Continuous <Continuous>  dextrose 50% Injectable 25 Gram(s) IV Push once  dextrose 50% Injectable 12.5 Gram(s) IV Push once  enalapril 20 milliGRAM(s) Oral two times a day  enoxaparin Injectable 40 milliGRAM(s) SubCutaneous every 24 hours  glucagon  Injectable 1 milliGRAM(s) IntraMuscular once  insulin glargine Injectable (LANTUS) 34 Unit(s) SubCutaneous at bedtime  insulin lispro (ADMELOG) corrective regimen sliding scale   SubCutaneous three times a day before meals  insulin lispro (ADMELOG) corrective regimen sliding scale   SubCutaneous at bedtime  insulin lispro Injectable (ADMELOG) 13 Unit(s) SubCutaneous three times a day before meals  lidocaine   4% Patch 1 Patch Transdermal every 24 hours  metoprolol succinate ER 50 milliGRAM(s) Oral daily    MEDICATIONS  (PRN):  acetaminophen     Tablet .. 650 milliGRAM(s) Oral every 6 hours PRN Temp greater or equal to 38C (100.4F), Mild Pain (1 - 3)  cyclobenzaprine 5 milliGRAM(s) Oral three times a day PRN Muscle Spasm  dextrose Oral Gel 15 Gram(s) Oral once PRN Blood Glucose LESS THAN 70 milliGRAM(s)/deciliter  melatonin 3 milliGRAM(s) Oral at bedtime PRN Insomnia  naproxen 250 milliGRAM(s) Oral every 8 hours PRN mild to moderate pain (1-6)  senna 2 Tablet(s) Oral at bedtime PRN Constipation      CAPILLARY BLOOD GLUCOSE  POCT Blood Glucose.: 228 mg/dL (28 May 2024 11:56)  POCT Blood Glucose.: 163 mg/dL (28 May 2024 08:20)  POCT Blood Glucose.: 203 mg/dL (27 May 2024 22:26)  POCT Blood Glucose.: 130 mg/dL (27 May 2024 17:13)      PHYSICAL EXAM:  Vital Signs Last 24 Hrs  T(F): 97.7 (28 May 2024 10:17), Max: 98.2 (27 May 2024 14:30)  HR: 75 (28 May 2024 10:17) (57 - 77)  BP: 120/78 (28 May 2024 10:17) (104/57 - 165/83)  RR: 18 (28 May 2024 10:17) (16 - 18)  SpO2: 98% (28 May 2024 10:17) (96% - 100%)    Parameters below as of 28 May 2024 06:15  Patient On (Oxygen Delivery Method): room air        CONSTITUTIONAL: NAD, appears comfortable  EYES: PERRLA; conjunctiva and sclera clear  ENMT: Moist oral mucosa; normal dentition  RESPIRATORY: Normal respiratory effort; lungs are clear to auscultation bilaterally  CARDIOVASCULAR: Regular rate and rhythm; No lower extremity edema  ABDOMEN: Nontender to palpation, normoactive bowel sounds  MUSCULOSKELETAL:  no clubbing or cyanosis of digits; no joint swelling or tenderness to palpation  PSYCH: A+O to person, place, and time; affect appropriate  NEUROLOGY: CN 2-12 are intact and symmetric; no gross sensory deficits   SKIN: No rashes; no palpable lesions  : chronic indwelling urinary cath in place, draining clear yellow urine    LABS:                        14.8   6.44  )-----------( 196      ( 28 May 2024 06:45 )             41.5     05-28    137  |  103  |  27<H>  ----------------------------<  155<H>  4.1   |  20<L>  |  1.11    Ca    10.0      28 May 2024 06:45  Phos  3.4     05-28  Mg     2.20     05-28      PT/INR - ( 28 May 2024 06:45 )   PT: 10.8 sec;   INR: 0.95 ratio         PTT - ( 28 May 2024 06:45 )  PTT:36.6 sec

## 2024-05-29 ENCOUNTER — APPOINTMENT (OUTPATIENT)
Dept: NEUROSURGERY | Facility: HOSPITAL | Age: 74
End: 2024-05-29
Payer: MEDICARE

## 2024-05-29 DIAGNOSIS — M48.02 SPINAL STENOSIS, CERVICAL REGION: ICD-10-CM

## 2024-05-29 PROBLEM — Z00.00 ENCOUNTER FOR PREVENTIVE HEALTH EXAMINATION: Status: ACTIVE | Noted: 2024-05-29

## 2024-05-29 LAB
ANION GAP SERPL CALC-SCNC: 14 MMOL/L — SIGNIFICANT CHANGE UP (ref 7–14)
BLD GP AB SCN SERPL QL: NEGATIVE — SIGNIFICANT CHANGE UP
BLOOD GAS ARTERIAL - LYTES,HGB,ICA,LACT RESULT: SIGNIFICANT CHANGE UP
BLOOD GAS ARTERIAL - LYTES,HGB,ICA,LACT RESULT: SIGNIFICANT CHANGE UP
BUN SERPL-MCNC: 34 MG/DL — HIGH (ref 7–23)
CALCIUM SERPL-MCNC: 10.2 MG/DL — SIGNIFICANT CHANGE UP (ref 8.4–10.5)
CHLORIDE SERPL-SCNC: 101 MMOL/L — SIGNIFICANT CHANGE UP (ref 98–107)
CO2 SERPL-SCNC: 21 MMOL/L — LOW (ref 22–31)
CREAT SERPL-MCNC: 1.16 MG/DL — SIGNIFICANT CHANGE UP (ref 0.5–1.3)
EGFR: 67 ML/MIN/1.73M2 — SIGNIFICANT CHANGE UP
GLUCOSE BLDC GLUCOMTR-MCNC: 183 MG/DL — HIGH (ref 70–99)
GLUCOSE BLDC GLUCOMTR-MCNC: 198 MG/DL — HIGH (ref 70–99)
GLUCOSE BLDC GLUCOMTR-MCNC: 199 MG/DL — HIGH (ref 70–99)
GLUCOSE BLDC GLUCOMTR-MCNC: 237 MG/DL — HIGH (ref 70–99)
GLUCOSE SERPL-MCNC: 226 MG/DL — HIGH (ref 70–99)
HCT VFR BLD CALC: 41 % — SIGNIFICANT CHANGE UP (ref 39–50)
HGB BLD-MCNC: 14.8 G/DL — SIGNIFICANT CHANGE UP (ref 13–17)
INR BLD: 1 RATIO — SIGNIFICANT CHANGE UP (ref 0.85–1.18)
MAGNESIUM SERPL-MCNC: 2.2 MG/DL — SIGNIFICANT CHANGE UP (ref 1.6–2.6)
MCHC RBC-ENTMCNC: 31.4 PG — SIGNIFICANT CHANGE UP (ref 27–34)
MCHC RBC-ENTMCNC: 36.1 GM/DL — HIGH (ref 32–36)
MCV RBC AUTO: 86.9 FL — SIGNIFICANT CHANGE UP (ref 80–100)
NRBC # BLD: 0 /100 WBCS — SIGNIFICANT CHANGE UP (ref 0–0)
NRBC # FLD: 0 K/UL — SIGNIFICANT CHANGE UP (ref 0–0)
PHOSPHATE SERPL-MCNC: 3.3 MG/DL — SIGNIFICANT CHANGE UP (ref 2.5–4.5)
PLATELET # BLD AUTO: 199 K/UL — SIGNIFICANT CHANGE UP (ref 150–400)
POTASSIUM SERPL-MCNC: 4.4 MMOL/L — SIGNIFICANT CHANGE UP (ref 3.5–5.3)
POTASSIUM SERPL-SCNC: 4.4 MMOL/L — SIGNIFICANT CHANGE UP (ref 3.5–5.3)
PROTHROM AB SERPL-ACNC: 11.2 SEC — SIGNIFICANT CHANGE UP (ref 9.5–13)
RBC # BLD: 4.72 M/UL — SIGNIFICANT CHANGE UP (ref 4.2–5.8)
RBC # FLD: 12.4 % — SIGNIFICANT CHANGE UP (ref 10.3–14.5)
RH IG SCN BLD-IMP: POSITIVE — SIGNIFICANT CHANGE UP
SODIUM SERPL-SCNC: 136 MMOL/L — SIGNIFICANT CHANGE UP (ref 135–145)
WBC # BLD: 6.3 K/UL — SIGNIFICANT CHANGE UP (ref 3.8–10.5)
WBC # FLD AUTO: 6.3 K/UL — SIGNIFICANT CHANGE UP (ref 3.8–10.5)

## 2024-05-29 PROCEDURE — 20931 SP BONE ALGRFT STRUCT ADD-ON: CPT

## 2024-05-29 PROCEDURE — 71045 X-RAY EXAM CHEST 1 VIEW: CPT | Mod: 26

## 2024-05-29 PROCEDURE — 22552 ARTHRD ANT NTRBD CERVICAL EA: CPT

## 2024-05-29 PROCEDURE — 99232 SBSQ HOSP IP/OBS MODERATE 35: CPT

## 2024-05-29 PROCEDURE — 22551 ARTHRD ANT NTRBDY CERVICAL: CPT

## 2024-05-29 PROCEDURE — 22846 INSERT SPINE FIXATION DEVICE: CPT

## 2024-05-29 DEVICE — PLATE 3 LVL 57MM: Type: IMPLANTABLE DEVICE | Status: FUNCTIONAL

## 2024-05-29 DEVICE — SCREW SELF TAP VAR 4X16MM: Type: IMPLANTABLE DEVICE | Status: FUNCTIONAL

## 2024-05-29 DEVICE — BONE WAX 2.5GM: Type: IMPLANTABLE DEVICE | Status: FUNCTIONAL

## 2024-05-29 DEVICE — PIN TEMP FIXATION: Type: IMPLANTABLE DEVICE | Status: FUNCTIONAL

## 2024-05-29 DEVICE — SCREW SELF TAP VAR 4X14MM: Type: IMPLANTABLE DEVICE | Status: FUNCTIONAL

## 2024-05-29 DEVICE — SURGIFLO MATRIX WITH THROMBIN KIT: Type: IMPLANTABLE DEVICE | Status: FUNCTIONAL

## 2024-05-29 DEVICE — PIN DISTRACTOR CERV 14MMDISP: Type: IMPLANTABLE DEVICE | Status: FUNCTIONAL

## 2024-05-29 DEVICE — SURGIFOAM PAD 8CM X 12.5CM X 10MM (100): Type: IMPLANTABLE DEVICE | Status: FUNCTIONAL

## 2024-05-29 RX ORDER — FENTANYL CITRATE 50 UG/ML
50 INJECTION INTRAVENOUS
Refills: 0 | Status: DISCONTINUED | OUTPATIENT
Start: 2024-05-29 | End: 2024-05-29

## 2024-05-29 RX ORDER — ONDANSETRON 8 MG/1
4 TABLET, FILM COATED ORAL ONCE
Refills: 0 | Status: DISCONTINUED | OUTPATIENT
Start: 2024-05-29 | End: 2024-05-30

## 2024-05-29 RX ORDER — HYDROMORPHONE HYDROCHLORIDE 2 MG/ML
0.5 INJECTION INTRAMUSCULAR; INTRAVENOUS; SUBCUTANEOUS
Refills: 0 | Status: DISCONTINUED | OUTPATIENT
Start: 2024-05-29 | End: 2024-05-29

## 2024-05-29 RX ORDER — INSULIN GLARGINE 100 [IU]/ML
17 INJECTION, SOLUTION SUBCUTANEOUS AT BEDTIME
Refills: 0 | Status: COMPLETED | OUTPATIENT
Start: 2024-05-29 | End: 2024-05-30

## 2024-05-29 RX ORDER — DIAZEPAM 5 MG
2 TABLET ORAL ONCE
Refills: 0 | Status: DISCONTINUED | OUTPATIENT
Start: 2024-05-29 | End: 2024-05-29

## 2024-05-29 RX ORDER — CEFAZOLIN SODIUM 1 G
2000 VIAL (EA) INJECTION EVERY 8 HOURS
Refills: 0 | Status: COMPLETED | OUTPATIENT
Start: 2024-05-29 | End: 2024-05-30

## 2024-05-29 RX ORDER — HYDROMORPHONE HYDROCHLORIDE 2 MG/ML
0.5 INJECTION INTRAMUSCULAR; INTRAVENOUS; SUBCUTANEOUS
Refills: 0 | Status: DISCONTINUED | OUTPATIENT
Start: 2024-05-29 | End: 2024-05-30

## 2024-05-29 RX ORDER — INSULIN LISPRO 100/ML
2 VIAL (ML) SUBCUTANEOUS ONCE
Refills: 0 | Status: COMPLETED | OUTPATIENT
Start: 2024-05-29 | End: 2024-05-29

## 2024-05-29 RX ORDER — INSULIN LISPRO 100/ML
VIAL (ML) SUBCUTANEOUS EVERY 6 HOURS
Refills: 0 | Status: DISCONTINUED | OUTPATIENT
Start: 2024-05-29 | End: 2024-05-30

## 2024-05-29 RX ORDER — INSULIN GLARGINE 100 [IU]/ML
34 INJECTION, SOLUTION SUBCUTANEOUS AT BEDTIME
Refills: 0 | Status: DISCONTINUED | OUTPATIENT
Start: 2024-05-29 | End: 2024-05-30

## 2024-05-29 RX ORDER — POVIDONE-IODINE 5 %
1 AEROSOL (ML) TOPICAL ONCE
Refills: 0 | Status: DISCONTINUED | OUTPATIENT
Start: 2024-05-29 | End: 2024-05-30

## 2024-05-29 RX ADMIN — HYDROMORPHONE HYDROCHLORIDE 0.5 MILLIGRAM(S): 2 INJECTION INTRAMUSCULAR; INTRAVENOUS; SUBCUTANEOUS at 22:30

## 2024-05-29 RX ADMIN — HYDROMORPHONE HYDROCHLORIDE 0.5 MILLIGRAM(S): 2 INJECTION INTRAMUSCULAR; INTRAVENOUS; SUBCUTANEOUS at 22:12

## 2024-05-29 RX ADMIN — LIDOCAINE 1 PATCH: 4 CREAM TOPICAL at 03:03

## 2024-05-29 RX ADMIN — HYDROMORPHONE HYDROCHLORIDE 0.5 MILLIGRAM(S): 2 INJECTION INTRAMUSCULAR; INTRAVENOUS; SUBCUTANEOUS at 23:05

## 2024-05-29 RX ADMIN — LIDOCAINE 1 PATCH: 4 CREAM TOPICAL at 12:15

## 2024-05-29 RX ADMIN — CHLORHEXIDINE GLUCONATE 1 APPLICATION(S): 213 SOLUTION TOPICAL at 12:14

## 2024-05-29 RX ADMIN — Medication 1 DROP(S): at 06:25

## 2024-05-29 RX ADMIN — Medication 20 MILLIGRAM(S): at 06:25

## 2024-05-29 RX ADMIN — Medication 50 MILLIGRAM(S): at 06:24

## 2024-05-29 RX ADMIN — HYDROMORPHONE HYDROCHLORIDE 0.5 MILLIGRAM(S): 2 INJECTION INTRAMUSCULAR; INTRAVENOUS; SUBCUTANEOUS at 22:45

## 2024-05-29 RX ADMIN — Medication 1: at 07:12

## 2024-05-29 RX ADMIN — Medication 2 UNIT(S): at 22:20

## 2024-05-29 RX ADMIN — Medication 2 MILLIGRAM(S): at 23:31

## 2024-05-29 RX ADMIN — Medication 1: at 12:12

## 2024-05-29 RX ADMIN — HYDROMORPHONE HYDROCHLORIDE 0.5 MILLIGRAM(S): 2 INJECTION INTRAMUSCULAR; INTRAVENOUS; SUBCUTANEOUS at 23:15

## 2024-05-29 NOTE — PROGRESS NOTE ADULT - PROBLEM SELECTOR PLAN 1
LE weakness with new pain and anterolateral numbness L>R  NS involvement apprec  -MRI with cord impingement; planned for an anterior cervical discectomy and fusion C4 to C7 on 5/29  -rectal tone intact on ED exam, patient denies fecal incontinence or saddle anesthesia  fall precautions  -PT consulted rec PMR consult, would obtain post op  - acetaminophen PRN, lidocaine patch daily, naproxen, flexeril prn  - cxr neg  -pt is medically optimized for planned sx, RCRI 3, 10.1% 30 day MACE, cont metoprolol to 50mg qd

## 2024-05-29 NOTE — PROGRESS NOTE ADULT - SUBJECTIVE AND OBJECTIVE BOX
True Rothman MD  Interventional Cardiology / Advance Heart Failure and Cardiac Transplant Specialist  [place] Office : [address] [place] [address] [place]. [address]  Tel:   [place] Office : [address] [facility] [place] [address]  Tel: [phone]       Pt is lying in bed comfortable not in distress, no chest pains no SOB no palpitations  	  MEDICATIONS:  enalapril 20 milliGRAM(s) Oral two times a day  metoprolol succinate ER 50 milliGRAM(s) Oral daily        acetaminophen     Tablet .. 650 milliGRAM(s) Oral every 6 hours PRN  cyclobenzaprine 5 milliGRAM(s) Oral three times a day PRN  melatonin 3 milliGRAM(s) Oral at bedtime PRN  naproxen 250 milliGRAM(s) Oral every 8 hours PRN    senna 2 Tablet(s) Oral at bedtime PRN    dextrose 50% Injectable 25 Gram(s) IV Push once  dextrose 50% Injectable 12.5 Gram(s) IV Push once  dextrose Oral Gel 15 Gram(s) Oral once PRN  glucagon  Injectable 1 milliGRAM(s) IntraMuscular once  insulin glargine Injectable (LANTUS) 34 Unit(s) SubCutaneous at bedtime  insulin lispro (ADMELOG) corrective regimen sliding scale   SubCutaneous every 6 hours  insulin lispro Injectable (ADMELOG) 13 Unit(s) SubCutaneous three times a day before meals    artificial  tears Solution 1 Drop(s) Both EYES three times a day  chlorhexidine 2% Cloths 1 Application(s) Topical daily  dextrose 10% Bolus 125 milliLiter(s) IV Bolus once  dextrose 5%. 1000 milliLiter(s) IV Continuous <Continuous>  dextrose 5%. 1000 milliLiter(s) IV Continuous <Continuous>  lidocaine   4% Patch 1 Patch Transdermal every 24 hours  povidone iodine 5% Nasal Swab 1 Application(s) Both Nostrils once      PAST MEDICAL/SURGICAL HISTORY  PAST MEDICAL & SURGICAL HISTORY:  Diabetes      Neurogenic bladder      Chronic indwelling Salazar catheter      Hypertension      CAD (coronary artery disease)      History of appendectomy      S/P CABG (coronary artery bypass graft)          SOCIAL HISTORY: Substance Use (street drugs): ( x ) never used  (  ) other:    FAMILY HISTORY:  Family hx of lung cancer (Father)      PHYSICAL EXAM:  T(C): 36.6 (05-29-24 @ 10:20), Max: 36.8 (05-28-24 @ 22:15)  HR: 64 (05-29-24 @ 10:20) (64 - 81)  BP: 131/75 (05-29-24 @ 10:20) (131/75 - 162/82)  RR: 17 (05-29-24 @ 10:20) (17 - 18)  SpO2: 98% (05-29-24 @ 10:20) (97% - 99%)  Wt(kg): --  I&O's Summary    28 May 2024 07:01  -  29 May 2024 07:00  --------------------------------------------------------  IN: 0 mL / OUT: 700 mL / NET: -700 mL          GENERAL: NAD  EYES:   PERRLA   ENMT:   Moist mucous membranes, Good dentition, No lesions  Cardiovascular: Normal S1 S2, No JVD, No murmurs, No edema  Respiratory: Lungs clear to auscultation	  Gastrointestinal:  Soft, Non-tender, + BS	  Extremities: no edema                                    14.8   6.30  )-----------( 199      ( 29 May 2024 04:35 )             41.0     05-29    136  |  101  |  34<H>  ----------------------------<  226<H>  4.4   |  21<L>  |  1.16    Ca    10.2      29 May 2024 04:35  Phos  3.3     05-29  Mg     2.20     05-29      proBNP:   Lipid Profile:   HgA1c:   TSH:     Consultant(s) Notes Reviewed:  [x ] YES  [ ] NO    Care Discussed with Consultants/Other Providers [ x] YES  [ ] NO    Imaging Personally Reviewed independently:  [x] YES  [ ] NO    All labs, radiologic studies, vitals, orders and medications list reviewed. Patient is seen and examined at bedside. Case discussed with medical team.

## 2024-05-29 NOTE — BRIEF OPERATIVE NOTE - NSICDXBRIEFPROCEDURE_GEN_ALL_CORE_FT
PROCEDURES:  Anterior cervical discectomy and fusion (ACDF) at 3 levels 29-May-2024 16:13:12  Gardenia Bridges

## 2024-05-29 NOTE — PRE-OP CHECKLIST - SELECT TESTS ORDERED
BMP/CBC/PT/PTT/INR/Type and Screen FS  183  @14:45/BMP/CBC/PT/PTT/INR/Type and Screen/POCT Blood Glucose

## 2024-05-29 NOTE — PROGRESS NOTE ADULT - SUBJECTIVE AND OBJECTIVE BOX
[facility] Division of Hospital Medicine  Dyana Heath MD  Pager [phone]    Patient is a 73y old  Male who presents with a chief complaint of BL LE weakness       SUBJECTIVE / OVERNIGHT EVENTS: pt seen earlier this AN; sleeping, resting comfortably; OR today with NS      MEDICATIONS  (STANDING):  artificial  tears Solution 1 Drop(s) Both EYES three times a day  chlorhexidine 2% Cloths 1 Application(s) Topical daily  dextrose 10% Bolus 125 milliLiter(s) IV Bolus once  dextrose 5%. 1000 milliLiter(s) (50 mL/Hr) IV Continuous <Continuous>  dextrose 5%. 1000 milliLiter(s) (100 mL/Hr) IV Continuous <Continuous>  dextrose 50% Injectable 12.5 Gram(s) IV Push once  dextrose 50% Injectable 25 Gram(s) IV Push once  enalapril 20 milliGRAM(s) Oral two times a day  glucagon  Injectable 1 milliGRAM(s) IntraMuscular once  insulin glargine Injectable (LANTUS) 27 Unit(s) SubCutaneous at bedtime  insulin lispro (ADMELOG) corrective regimen sliding scale   SubCutaneous every 6 hours  insulin lispro Injectable (ADMELOG) 13 Unit(s) SubCutaneous three times a day before meals  lidocaine   4% Patch 1 Patch Transdermal every 24 hours  metoprolol succinate ER 50 milliGRAM(s) Oral daily  povidone iodine 5% Nasal Swab 1 Application(s) Both Nostrils once    MEDICATIONS  (PRN):  acetaminophen     Tablet .. 650 milliGRAM(s) Oral every 6 hours PRN Temp greater or equal to 38C (100.4F), Mild Pain (1 - 3)  cyclobenzaprine 5 milliGRAM(s) Oral three times a day PRN Muscle Spasm  dextrose Oral Gel 15 Gram(s) Oral once PRN Blood Glucose LESS THAN 70 milliGRAM(s)/deciliter  melatonin 3 milliGRAM(s) Oral at bedtime PRN Insomnia  naproxen 250 milliGRAM(s) Oral every 8 hours PRN mild to moderate pain (1-6)  senna 2 Tablet(s) Oral at bedtime PRN Constipation      CAPILLARY BLOOD GLUCOSE  POCT Blood Glucose.: 198 mg/dL (29 May 2024 06:24)  POCT Blood Glucose.: 215 mg/dL (28 May 2024 22:14)  POCT Blood Glucose.: 178 mg/dL (28 May 2024 17:50)  POCT Blood Glucose.: 228 mg/dL (28 May 2024 11:56)      PHYSICAL EXAM:  Vital Signs Last 24 Hrs  T(F): 97.8 (29 May 2024 10:20), Max: 98.2 (28 May 2024 22:15)  HR: 64 (29 May 2024 10:20) (64 - 81)  BP: 131/75 (29 May 2024 10:20) (117/74 - 162/82)  RR: 17 (29 May 2024 10:20) (17 - 18)  SpO2: 98% (29 May 2024 10:20) (97% - 99%)    Parameters below as of 29 May 2024 10:20  Patient On (Oxygen Delivery Method): room air      exam limited as deferred to pt sleep  CONSTITUTIONAL: NAD, appears comfortable  EYES: closed  ENMT: Moist oral mucosa  RESPIRATORY: Normal respiratory effort  CARDIOVASCULAR: No lower extremity edema  MUSCULOSKELETAL: no clubbing or cyanosis of digits; no joint swelling or tenderness to palpation  : indwelling urinary cath in place, clear yellow urine draining    LABS:                        14.8   6.30  )-----------( 199      ( 29 May 2024 04:35 )             41.0     05-29    136  |  101  |  34<H>  ----------------------------<  226<H>  4.4   |  21<L>  |  1.16    Ca    10.2      29 May 2024 04:35  Phos  3.3     05-29  Mg     2.20     05-29      PT/INR - ( 29 May 2024 07:15 )   PT: 11.2 sec;   INR: 1.00 ratio

## 2024-05-29 NOTE — PROGRESS NOTE ADULT - ASSESSMENT
73 year old M with spinal stenosis and chronic hernandez in place due to neurogenic bladder, with progressive weakness and pain in lower extremities since 5/15. CT cervical spine reveals high-grade bilateral C2-C3, C3-C4, C5-C6, and C6-C7 foraminal stenosis, as well as high-grade C5-C6 central canal stenosis due to advanced disc and uncovertebral joint degeneration.     5/29: OR for C4-C7 ACDF

## 2024-05-29 NOTE — PROGRESS NOTE ADULT - SUBJECTIVE AND OBJECTIVE BOX
PAST 24HR EVENTS: No acute events overnight. Pt NPO for OR today for C4-7 ACDF    Vital Signs Last 24 Hrs  T(C): 36.7 (28 May 2024 14:10), Max: 36.7 (28 May 2024 14:10)  T(F): 98 (28 May 2024 14:10), Max: 98 (28 May 2024 14:10)  HR: 74 (28 May 2024 18:11) (57 - 75)  BP: 137/81 (28 May 2024 18:11) (104/57 - 137/81)  BP(mean): --  RR: 18 (28 May 2024 18:11) (17 - 18)  SpO2: 99% (28 May 2024 18:11) (98% - 100%)    Parameters below as of 28 May 2024 18:11  Patient On (Oxygen Delivery Method): room air        MEDS:   acetaminophen     Tablet .. 650 milliGRAM(s) Oral every 6 hours PRN  artificial  tears Solution 1 Drop(s) Both EYES three times a day  chlorhexidine 2% Cloths 1 Application(s) Topical daily  cyclobenzaprine 5 milliGRAM(s) Oral three times a day PRN  dextrose 10% Bolus 125 milliLiter(s) IV Bolus once  dextrose 5%. 1000 milliLiter(s) IV Continuous <Continuous>  dextrose 5%. 1000 milliLiter(s) IV Continuous <Continuous>  dextrose 50% Injectable 25 Gram(s) IV Push once  dextrose 50% Injectable 12.5 Gram(s) IV Push once  dextrose Oral Gel 15 Gram(s) Oral once PRN  enalapril 20 milliGRAM(s) Oral two times a day  glucagon  Injectable 1 milliGRAM(s) IntraMuscular once  insulin glargine Injectable (LANTUS) 27 Unit(s) SubCutaneous at bedtime  insulin lispro (ADMELOG) corrective regimen sliding scale   SubCutaneous every 6 hours  insulin lispro Injectable (ADMELOG) 13 Unit(s) SubCutaneous three times a day before meals  lidocaine   4% Patch 1 Patch Transdermal every 24 hours  melatonin 3 milliGRAM(s) Oral at bedtime PRN  metoprolol succinate ER 50 milliGRAM(s) Oral daily  naproxen 250 milliGRAM(s) Oral every 8 hours PRN  senna 2 Tablet(s) Oral at bedtime PRN      LABS:                        14.8   6.44  )-----------( 196      ( 28 May 2024 06:45 )             41.5     05-28    137  |  103  |  27<H>  ----------------------------<  155<H>  4.1   |  20<L>  |  1.11    Ca    10.0      28 May 2024 06:45  Phos  3.4     05-28  Mg     2.20     05-28      PT/INR - ( 28 May 2024 06:45 )   PT: 10.8 sec;   INR: 0.95 ratio         PTT - ( 28 May 2024 06:45 )  PTT:36.6 sec    PHYSICAL EXAM:  AOx3, FC  PERRL, EOMI  BUE 5/5 throughough  BLE 5/5  SILT

## 2024-05-29 NOTE — CONSULT NOTE ADULT - SUBJECTIVE AND OBJECTIVE BOX
INTRA-OP ENT CONSULT    HPI: 73 year old M currently undergoing C4-7 ACDF for high grade C2-7 foraminal stenosis and C5-6 central canal stenosis. ENT called for intra-op consult with c/f esophageal injury. Per attending surgeon, the drill skipped and moved and he noticed a small bubble afterwards, raising concern for possible esophageal injury. The operative site was evaluated under microscopy and site of concern appreciated with what appeared to be a superficial injury to the constrictor muscle at the right cervical esophagus with subtle hernation of the submucosa through the small defect. No internal mucosa appeared to be exposed. Case discussed with Dr. Nelson and recommended for small defect observation versus reapproximation of the muscular layer.     Allergies: nkda      PAST MEDICAL & SURGICAL HISTORY:  Diabetes  Neurogenic bladder  Chronic indwelling Salazar catheter  Hypertension  CAD (coronary artery disease)  History of appendectomy  S/P CABG (coronary artery bypass graft)      SOCIAL HISTORY:  Tobacco History:  ETOH Use:   Drug Use:     FAMILY HISTORY:  Family hx of lung cancer (Father)        REVIEW OF SYSTEMS    General:	  As per HPI      MEDICATIONS:        Vital Signs Last 24 Hrs  T(C): 36.9 (29 May 2024 22:00), Max: 36.9 (29 May 2024 22:00)  T(F): 98.4 (29 May 2024 22:00), Max: 98.4 (29 May 2024 22:00)  HR: 84 (29 May 2024 22:45) (64 - 87)  BP: 149/84 (29 May 2024 22:30) (118/83 - 157/84)  BP(mean): 100 (29 May 2024 22:30) (85 - 100)  RR: 12 (29 May 2024 22:45) (12 - 20)  SpO2: 95% (29 May 2024 22:45) (93% - 98%)    Parameters below as of 29 May 2024 22:00  Patient On (Oxygen Delivery Method): room air        LABS:  CBC-                        14.8   6.30  )-----------( 199      ( 29 May 2024 04:35 )             41.0       05-29    136  |  101  |  34<H>  ----------------------------<  226<H>  4.4   |  21<L>  |  1.16    Ca    10.2      29 May 2024 04:35  Phos  3.3     05-29  Mg     2.20     05-29      Coagulation Studies-  PT/INR - ( 29 May 2024 07:15 )   PT: 11.2 sec;   INR: 1.00 ratio         PTT - ( 28 May 2024 06:45 )  PTT:36.6 sec  Endocrine Panel-  --  --  10.2 mg/dL  --  --  10.0 mg/dL        PHYSICAL EXAM:    Constitutional: Intubated, sedated, hemodynamically stable, patient draped with operative site exposed   Neck: ACDF field open with findings as described above         Assessment/Plan:  73y Male undergoing ACDF with c/f intra-op esophageal injury. Site evaluated and recommendations discussed with H&N attending and neurosurgery attending.   - would recommend keeping NPO post-op  - post-op CXR to evaluate for pneumomediastinum   - observe for tachycardia, chest pain, fever  - if symptomatically doing well (no concern for mediastinitis or leak) can advance diet tomorrow AM  - if any concern for leak, would recommend esophagram with gastrograffin to evaluate for leak prior to feeding   - d/w Dr. Nelson   - call/page with questions     Carly Deleon MD   Otorhinolaryngology Head & Neck Surgery PGY3    [phone]# Pediatric ENT pager  [phone]# Adult ENT pager    Available on Teams, please page if urgent.      INTRA-OP ENT CONSULT    HPI: 73 year old M currently undergoing C4-7 ACDF for high grade C2-7 foraminal stenosis and C5-6 central canal stenosis. ENT called for intra-op consult with c/f esophageal injury. Per attending surgeon, the drill skipped and moved near the esophagus and he noticed a small bubble afterwards, raising concern for possible esophageal injury. The operative site was evaluated under microscopy and site of concern appreciated with what appeared to be a superficial injury to the constrictor muscle at the right cervical esophagus with subtle hernation of the submucosa through the small defect. No internal mucosa appeared to be exposed. Case discussed with Dr. Nelson and recommended for small defect observation versus reapproximation of the muscular layer.     Allergies: nkda      PAST MEDICAL & SURGICAL HISTORY:  Diabetes  Neurogenic bladder  Chronic indwelling Salazar catheter  Hypertension  CAD (coronary artery disease)  History of appendectomy  S/P CABG (coronary artery bypass graft)      SOCIAL HISTORY:  Tobacco History:  ETOH Use:   Drug Use:     FAMILY HISTORY:  Family hx of lung cancer (Father)        REVIEW OF SYSTEMS    General:	  As per HPI      MEDICATIONS:        Vital Signs Last 24 Hrs  T(C): 36.9 (29 May 2024 22:00), Max: 36.9 (29 May 2024 22:00)  T(F): 98.4 (29 May 2024 22:00), Max: 98.4 (29 May 2024 22:00)  HR: 84 (29 May 2024 22:45) (64 - 87)  BP: 149/84 (29 May 2024 22:30) (118/83 - 157/84)  BP(mean): 100 (29 May 2024 22:30) (85 - 100)  RR: 12 (29 May 2024 22:45) (12 - 20)  SpO2: 95% (29 May 2024 22:45) (93% - 98%)    Parameters below as of 29 May 2024 22:00  Patient On (Oxygen Delivery Method): room air        LABS:  CBC-                        14.8   6.30  )-----------( 199      ( 29 May 2024 04:35 )             41.0       05-29    136  |  101  |  34<H>  ----------------------------<  226<H>  4.4   |  21<L>  |  1.16    Ca    10.2      29 May 2024 04:35  Phos  3.3     05-29  Mg     2.20     05-29      Coagulation Studies-  PT/INR - ( 29 May 2024 07:15 )   PT: 11.2 sec;   INR: 1.00 ratio         PTT - ( 28 May 2024 06:45 )  PTT:36.6 sec  Endocrine Panel-  --  --  10.2 mg/dL  --  --  10.0 mg/dL        PHYSICAL EXAM:    Constitutional: Intubated, sedated, hemodynamically stable, patient draped with operative site exposed   Neck: ACDF field open with findings as described above         Assessment/Plan:  73y Male undergoing ACDF with c/f intra-op esophageal injury. Site evaluated and recommendations discussed with H&N attending and neurosurgery attending.   - would recommend keeping NPO post-op  - post-op CXR to evaluate for pneumomediastinum   - observe for tachycardia, chest pain, fever  - if symptomatically doing well (no concern for mediastinitis or leak) can advance diet tomorrow AM  - if any concern for leak, would recommend esophagram with gastrograffin to evaluate for leak prior to feeding   - d/w Dr. Nelson   - call/page with questions     Carly Deleon MD   Otorhinolaryngology Head & Neck Surgery PGY3    [phone]# Pediatric ENT pager  [phone]# Adult ENT pager    Available on Teams, please page if urgent.

## 2024-05-30 PROBLEM — I25.10 ATHEROSCLEROTIC HEART DISEASE OF NATIVE CORONARY ARTERY WITHOUT ANGINA PECTORIS: Chronic | Status: ACTIVE | Noted: 2024-05-20

## 2024-05-30 PROBLEM — I10 ESSENTIAL (PRIMARY) HYPERTENSION: Chronic | Status: ACTIVE | Noted: 2024-05-20

## 2024-05-30 LAB
ANION GAP SERPL CALC-SCNC: 14 MMOL/L — SIGNIFICANT CHANGE UP (ref 7–14)
BUN SERPL-MCNC: 24 MG/DL — HIGH (ref 7–23)
CALCIUM SERPL-MCNC: 9.9 MG/DL — SIGNIFICANT CHANGE UP (ref 8.4–10.5)
CHLORIDE SERPL-SCNC: 101 MMOL/L — SIGNIFICANT CHANGE UP (ref 98–107)
CO2 SERPL-SCNC: 20 MMOL/L — LOW (ref 22–31)
CREAT SERPL-MCNC: 1.04 MG/DL — SIGNIFICANT CHANGE UP (ref 0.5–1.3)
EGFR: 76 ML/MIN/1.73M2 — SIGNIFICANT CHANGE UP
GLUCOSE BLDC GLUCOMTR-MCNC: 224 MG/DL — HIGH (ref 70–99)
GLUCOSE BLDC GLUCOMTR-MCNC: 230 MG/DL — HIGH (ref 70–99)
GLUCOSE BLDC GLUCOMTR-MCNC: 241 MG/DL — HIGH (ref 70–99)
GLUCOSE BLDC GLUCOMTR-MCNC: 244 MG/DL — HIGH (ref 70–99)
GLUCOSE BLDC GLUCOMTR-MCNC: 277 MG/DL — HIGH (ref 70–99)
GLUCOSE BLDC GLUCOMTR-MCNC: 281 MG/DL — HIGH (ref 70–99)
GLUCOSE SERPL-MCNC: 274 MG/DL — HIGH (ref 70–99)
HCT VFR BLD CALC: 39.6 % — SIGNIFICANT CHANGE UP (ref 39–50)
HGB BLD-MCNC: 14.1 G/DL — SIGNIFICANT CHANGE UP (ref 13–17)
MAGNESIUM SERPL-MCNC: 1.8 MG/DL — SIGNIFICANT CHANGE UP (ref 1.6–2.6)
MCHC RBC-ENTMCNC: 30.9 PG — SIGNIFICANT CHANGE UP (ref 27–34)
MCHC RBC-ENTMCNC: 35.6 GM/DL — SIGNIFICANT CHANGE UP (ref 32–36)
MCV RBC AUTO: 86.8 FL — SIGNIFICANT CHANGE UP (ref 80–100)
NRBC # BLD: 0 /100 WBCS — SIGNIFICANT CHANGE UP (ref 0–0)
NRBC # FLD: 0 K/UL — SIGNIFICANT CHANGE UP (ref 0–0)
PHOSPHATE SERPL-MCNC: 3.3 MG/DL — SIGNIFICANT CHANGE UP (ref 2.5–4.5)
PLATELET # BLD AUTO: 209 K/UL — SIGNIFICANT CHANGE UP (ref 150–400)
POTASSIUM SERPL-MCNC: 4.7 MMOL/L — SIGNIFICANT CHANGE UP (ref 3.5–5.3)
POTASSIUM SERPL-SCNC: 4.7 MMOL/L — SIGNIFICANT CHANGE UP (ref 3.5–5.3)
RBC # BLD: 4.56 M/UL — SIGNIFICANT CHANGE UP (ref 4.2–5.8)
RBC # FLD: 12 % — SIGNIFICANT CHANGE UP (ref 10.3–14.5)
SODIUM SERPL-SCNC: 135 MMOL/L — SIGNIFICANT CHANGE UP (ref 135–145)
WBC # BLD: 9.8 K/UL — SIGNIFICANT CHANGE UP (ref 3.8–10.5)
WBC # FLD AUTO: 9.8 K/UL — SIGNIFICANT CHANGE UP (ref 3.8–10.5)

## 2024-05-30 PROCEDURE — 99232 SBSQ HOSP IP/OBS MODERATE 35: CPT

## 2024-05-30 PROCEDURE — 99222 1ST HOSP IP/OBS MODERATE 55: CPT

## 2024-05-30 RX ORDER — INSULIN LISPRO 100/ML
15 VIAL (ML) SUBCUTANEOUS
Refills: 0 | Status: DISCONTINUED | OUTPATIENT
Start: 2024-05-30 | End: 2024-05-31

## 2024-05-30 RX ORDER — OXYCODONE HYDROCHLORIDE 5 MG/1
5 TABLET ORAL EVERY 6 HOURS
Refills: 0 | Status: DISCONTINUED | OUTPATIENT
Start: 2024-05-30 | End: 2024-05-30

## 2024-05-30 RX ORDER — TRAMADOL HYDROCHLORIDE 50 MG/1
50 TABLET ORAL EVERY 6 HOURS
Refills: 0 | Status: DISCONTINUED | OUTPATIENT
Start: 2024-05-30 | End: 2024-06-04

## 2024-05-30 RX ORDER — POLYETHYLENE GLYCOL 3350 17 G/17G
17 POWDER, FOR SOLUTION ORAL DAILY
Refills: 0 | Status: DISCONTINUED | OUTPATIENT
Start: 2024-05-30 | End: 2024-06-03

## 2024-05-30 RX ORDER — SODIUM CHLORIDE 9 MG/ML
1000 INJECTION INTRAMUSCULAR; INTRAVENOUS; SUBCUTANEOUS
Refills: 0 | Status: DISCONTINUED | OUTPATIENT
Start: 2024-05-30 | End: 2024-05-30

## 2024-05-30 RX ORDER — INSULIN GLARGINE 100 [IU]/ML
38 INJECTION, SOLUTION SUBCUTANEOUS AT BEDTIME
Refills: 0 | Status: DISCONTINUED | OUTPATIENT
Start: 2024-05-30 | End: 2024-05-31

## 2024-05-30 RX ORDER — ACETAMINOPHEN 500 MG
1000 TABLET ORAL EVERY 6 HOURS
Refills: 0 | Status: DISCONTINUED | OUTPATIENT
Start: 2024-05-30 | End: 2024-06-01

## 2024-05-30 RX ORDER — INSULIN LISPRO 100/ML
VIAL (ML) SUBCUTANEOUS
Refills: 0 | Status: DISCONTINUED | OUTPATIENT
Start: 2024-05-30 | End: 2024-06-04

## 2024-05-30 RX ORDER — ACETAMINOPHEN 500 MG
1000 TABLET ORAL EVERY 6 HOURS
Refills: 0 | Status: DISCONTINUED | OUTPATIENT
Start: 2024-05-30 | End: 2024-05-30

## 2024-05-30 RX ORDER — INSULIN GLARGINE 100 [IU]/ML
34 INJECTION, SOLUTION SUBCUTANEOUS AT BEDTIME
Refills: 0 | Status: DISCONTINUED | OUTPATIENT
Start: 2024-05-30 | End: 2024-05-30

## 2024-05-30 RX ADMIN — Medication 20 MILLIGRAM(S): at 21:03

## 2024-05-30 RX ADMIN — LIDOCAINE 1 PATCH: 4 CREAM TOPICAL at 14:35

## 2024-05-30 RX ADMIN — Medication 1000 MILLIGRAM(S): at 13:10

## 2024-05-30 RX ADMIN — Medication 100 MILLIGRAM(S): at 06:00

## 2024-05-30 RX ADMIN — Medication 15 UNIT(S): at 12:10

## 2024-05-30 RX ADMIN — Medication 3: at 00:25

## 2024-05-30 RX ADMIN — Medication 20 MILLIGRAM(S): at 12:11

## 2024-05-30 RX ADMIN — Medication 100 MILLIGRAM(S): at 14:27

## 2024-05-30 RX ADMIN — SODIUM CHLORIDE 80 MILLILITER(S): 9 INJECTION INTRAMUSCULAR; INTRAVENOUS; SUBCUTANEOUS at 02:11

## 2024-05-30 RX ADMIN — INSULIN GLARGINE 38 UNIT(S): 100 INJECTION, SOLUTION SUBCUTANEOUS at 22:28

## 2024-05-30 RX ADMIN — Medication 15 UNIT(S): at 17:07

## 2024-05-30 RX ADMIN — Medication 2: at 07:23

## 2024-05-30 RX ADMIN — HYDROMORPHONE HYDROCHLORIDE 0.5 MILLIGRAM(S): 2 INJECTION INTRAMUSCULAR; INTRAVENOUS; SUBCUTANEOUS at 01:05

## 2024-05-30 RX ADMIN — Medication 2: at 17:06

## 2024-05-30 RX ADMIN — Medication 1 DROP(S): at 15:08

## 2024-05-30 RX ADMIN — Medication 3 MILLIGRAM(S): at 23:23

## 2024-05-30 RX ADMIN — Medication 2.5 MILLIGRAM(S): at 00:27

## 2024-05-30 RX ADMIN — Medication 5 MILLIGRAM(S): at 21:03

## 2024-05-30 RX ADMIN — Medication 2: at 22:28

## 2024-05-30 RX ADMIN — INSULIN GLARGINE 17 UNIT(S): 100 INJECTION, SOLUTION SUBCUTANEOUS at 00:26

## 2024-05-30 RX ADMIN — Medication 1000 MILLIGRAM(S): at 18:21

## 2024-05-30 RX ADMIN — Medication 1000 MILLIGRAM(S): at 12:10

## 2024-05-30 RX ADMIN — CHLORHEXIDINE GLUCONATE 1 APPLICATION(S): 213 SOLUTION TOPICAL at 14:40

## 2024-05-30 RX ADMIN — HYDROMORPHONE HYDROCHLORIDE 0.5 MILLIGRAM(S): 2 INJECTION INTRAMUSCULAR; INTRAVENOUS; SUBCUTANEOUS at 01:30

## 2024-05-30 RX ADMIN — HYDROMORPHONE HYDROCHLORIDE 0.5 MILLIGRAM(S): 2 INJECTION INTRAMUSCULAR; INTRAVENOUS; SUBCUTANEOUS at 06:00

## 2024-05-30 RX ADMIN — HYDROMORPHONE HYDROCHLORIDE 0.5 MILLIGRAM(S): 2 INJECTION INTRAMUSCULAR; INTRAVENOUS; SUBCUTANEOUS at 03:08

## 2024-05-30 RX ADMIN — TRAMADOL HYDROCHLORIDE 50 MILLIGRAM(S): 50 TABLET ORAL at 22:16

## 2024-05-30 RX ADMIN — HYDROMORPHONE HYDROCHLORIDE 0.5 MILLIGRAM(S): 2 INJECTION INTRAMUSCULAR; INTRAVENOUS; SUBCUTANEOUS at 07:00

## 2024-05-30 RX ADMIN — TRAMADOL HYDROCHLORIDE 50 MILLIGRAM(S): 50 TABLET ORAL at 21:16

## 2024-05-30 RX ADMIN — Medication 2: at 12:09

## 2024-05-30 RX ADMIN — HYDROMORPHONE HYDROCHLORIDE 0.5 MILLIGRAM(S): 2 INJECTION INTRAMUSCULAR; INTRAVENOUS; SUBCUTANEOUS at 03:30

## 2024-05-30 RX ADMIN — Medication 50 MILLIGRAM(S): at 11:04

## 2024-05-30 RX ADMIN — LIDOCAINE 1 PATCH: 4 CREAM TOPICAL at 18:50

## 2024-05-30 RX ADMIN — Medication 1000 MILLIGRAM(S): at 19:20

## 2024-05-30 NOTE — CONSULT NOTE ADULT - CONSULT REQUESTED DATE/TIME
21-May-2024 14:13
29-May-2024 19:30
30-May-2024 11:51
20-May-2024 16:13
23-May-2024 15:59
20-May-2024 14:12

## 2024-05-30 NOTE — PROGRESS NOTE ADULT - SUBJECTIVE AND OBJECTIVE BOX
PAST 24HR EVENTS:    HPI: 74y Male       Vital Signs Last 24 Hrs  T(C): 36.6 (30 May 2024 04:00), Max: 36.9 (29 May 2024 22:00)  T(F): 97.8 (30 May 2024 04:00), Max: 98.4 (29 May 2024 22:00)  HR: 99 (30 May 2024 04:24) (64 - 99)  BP: 137/76 (30 May 2024 04:24) (118/83 - 161/91)  BP(mean): 92 (30 May 2024 04:24) (78 - 108)  RR: 18 (30 May 2024 04:24) (12 - 20)  SpO2: 100% (30 May 2024 04:24) (92% - 100%)    Parameters below as of 30 May 2024 03:15  Patient On (Oxygen Delivery Method): room air          MEDS:   acetaminophen     Tablet .. 650 milliGRAM(s) Oral every 6 hours PRN  artificial  tears Solution 1 Drop(s) Both EYES three times a day  ceFAZolin   IVPB 2000 milliGRAM(s) IV Intermittent every 8 hours  chlorhexidine 2% Cloths 1 Application(s) Topical daily  cyclobenzaprine 5 milliGRAM(s) Oral three times a day PRN  dextrose 10% Bolus 125 milliLiter(s) IV Bolus once  dextrose 5%. 1000 milliLiter(s) IV Continuous <Continuous>  dextrose 5%. 1000 milliLiter(s) IV Continuous <Continuous>  dextrose 50% Injectable 25 Gram(s) IV Push once  dextrose 50% Injectable 12.5 Gram(s) IV Push once  dextrose Oral Gel 15 Gram(s) Oral once PRN  enalapril 20 milliGRAM(s) Oral two times a day  enalaprilat Injectable 2.5 milliGRAM(s) IV Push once  glucagon  Injectable 1 milliGRAM(s) IntraMuscular once  insulin glargine Injectable (LANTUS) 34 Unit(s) SubCutaneous at bedtime  insulin lispro (ADMELOG) corrective regimen sliding scale   SubCutaneous every 6 hours  insulin lispro Injectable (ADMELOG) 13 Unit(s) SubCutaneous three times a day before meals  lidocaine   4% Patch 1 Patch Transdermal every 24 hours  melatonin 3 milliGRAM(s) Oral at bedtime PRN  metoprolol succinate ER 50 milliGRAM(s) Oral daily  naproxen 250 milliGRAM(s) Oral every 8 hours PRN  povidone iodine 5% Nasal Swab 1 Application(s) Both Nostrils once  senna 2 Tablet(s) Oral at bedtime PRN  sodium chloride 0.9%. 1000 milliLiter(s) IV Continuous <Continuous>      LABS:                        14.1   9.80  )-----------( 209      ( 30 May 2024 04:00 )             39.6     05-30    135  |  101  |  24<H>  ----------------------------<  274<H>  4.7   |  20<L>  |  1.04    Ca    9.9      30 May 2024 04:00  Phos  3.3     05-30  Mg     1.80     05-30      PT/INR - ( 29 May 2024 07:15 )   PT: 11.2 sec;   INR: 1.00 ratio         PTT - ( 28 May 2024 06:45 )  PTT:36.6 sec    RADIOLOGY:       PHYSICAL EXAM: PAST 24HR EVENTS: s/p C4-7 ACDF    HPI: 74y Male HPI:  Mr. Mayorga is a 73-year-old male with past medical history of IDDM2, spinal stenosis complicated by neurogenic bladder with chronic Salazar, HTN presenting for lower extremity weakness, per pt there is always component of baseline weakness as pt has used a cane to walk in the past. This last week pt reported struggling to ambulate due to pain going down b/l lateral legs along with numbness on anterolateral surface of leg. Pt reported taking tylenol and Aleve for these symptoms but there was no symptom improvement. Pt reports having to use furniture and a walker to ambulate. Pt denies fevers, chills, chest pain, dysuria, saddle anesthesia or fecal incontinence Pt has chronic hx of urinary incontinence in the context of spinal stenosis. Pt was previous found to be ESBL + in early may (patient reports history of Proteus infection in December 2023 s/p abx with recurrence shortly thereafter).     Salazar was changed on Wednesday in the DR.    In the ED VS notable for BP of 160/80. In the ED labs  notable for  and UA + for LE, nitrates, and bacteria. Imaging was notable for cervical stenosis and lordosis and thoracolumbar spine spondylosis.  In the ED pt received Zosyn and 0.5L NS.  (19 May 2024 21:20)        Vital Signs Last 24 Hrs  T(C): 36.6 (30 May 2024 04:00), Max: 36.9 (29 May 2024 22:00)  T(F): 97.8 (30 May 2024 04:00), Max: 98.4 (29 May 2024 22:00)  HR: 99 (30 May 2024 04:24) (64 - 99)  BP: 137/76 (30 May 2024 04:24) (118/83 - 161/91)  BP(mean): 92 (30 May 2024 04:24) (78 - 108)  RR: 18 (30 May 2024 04:24) (12 - 20)  SpO2: 100% (30 May 2024 04:24) (92% - 100%)    Parameters below as of 30 May 2024 03:15  Patient On (Oxygen Delivery Method): room air          MEDS:   acetaminophen     Tablet .. 650 milliGRAM(s) Oral every 6 hours PRN  artificial  tears Solution 1 Drop(s) Both EYES three times a day  ceFAZolin   IVPB 2000 milliGRAM(s) IV Intermittent every 8 hours  chlorhexidine 2% Cloths 1 Application(s) Topical daily  cyclobenzaprine 5 milliGRAM(s) Oral three times a day PRN  dextrose 10% Bolus 125 milliLiter(s) IV Bolus once  dextrose 5%. 1000 milliLiter(s) IV Continuous <Continuous>  dextrose 5%. 1000 milliLiter(s) IV Continuous <Continuous>  dextrose 50% Injectable 25 Gram(s) IV Push once  dextrose 50% Injectable 12.5 Gram(s) IV Push once  dextrose Oral Gel 15 Gram(s) Oral once PRN  enalapril 20 milliGRAM(s) Oral two times a day  enalaprilat Injectable 2.5 milliGRAM(s) IV Push once  glucagon  Injectable 1 milliGRAM(s) IntraMuscular once  insulin glargine Injectable (LANTUS) 34 Unit(s) SubCutaneous at bedtime  insulin lispro (ADMELOG) corrective regimen sliding scale   SubCutaneous every 6 hours  insulin lispro Injectable (ADMELOG) 13 Unit(s) SubCutaneous three times a day before meals  lidocaine   4% Patch 1 Patch Transdermal every 24 hours  melatonin 3 milliGRAM(s) Oral at bedtime PRN  metoprolol succinate ER 50 milliGRAM(s) Oral daily  naproxen 250 milliGRAM(s) Oral every 8 hours PRN  povidone iodine 5% Nasal Swab 1 Application(s) Both Nostrils once  senna 2 Tablet(s) Oral at bedtime PRN  sodium chloride 0.9%. 1000 milliLiter(s) IV Continuous <Continuous>      LABS:                        14.1   9.80  )-----------( 209      ( 30 May 2024 04:00 )             39.6     05-30    135  |  101  |  24<H>  ----------------------------<  274<H>  4.7   |  20<L>  |  1.04    Ca    9.9      30 May 2024 04:00  Phos  3.3     05-30  Mg     1.80     05-30      PT/INR - ( 29 May 2024 07:15 )   PT: 11.2 sec;   INR: 1.00 ratio         PTT - ( 28 May 2024 06:45 )  PTT:36.6 sec    RADIOLOGY:       PHYSICAL EXAM: awake, A&Ox3, fc  perrl  goff 5/5  silt HMV drain 0cc

## 2024-05-30 NOTE — OCCUPATIONAL THERAPY INITIAL EVALUATION ADULT - PERTINENT HX OF CURRENT PROBLEM, REHAB EVAL
Pt is a 74 year old male with hx of IDDM2, spinal stenosis complicated by neurogenic bladder with chronic Salazar, & HTN, who presented to [facility] on 5/19/24 for lower extremity weakness along with numbness on anterolateral surface of leg. MRI Spine on 5/20/24 displayed Multifocal cord impingement from C2/C3 through C5/C6 secondary to multilevel posterior disc osteophyte complexes and facet arthropathy. Pt is now s/p Anterior cervical discectomy and fusion (ACDF) at C4-7 on 5/29/24.

## 2024-05-30 NOTE — PROGRESS NOTE ADULT - SUBJECTIVE AND OBJECTIVE BOX
ANESTHESIA POSTOP CHECK    74y Male POSTOP DAY 1     Vital Signs Last 24 Hrs  T(C): 36.9 (30 May 2024 14:00), Max: 36.9 (29 May 2024 22:00)  T(F): 98.4 (30 May 2024 14:00), Max: 98.4 (29 May 2024 22:00)  HR: 90 (30 May 2024 14:00) (80 - 107)  BP: 144/94 (30 May 2024 14:00) (118/83 - 161/91)  BP(mean): 92 (30 May 2024 04:24) (78 - 108)  RR: 18 (30 May 2024 14:00) (12 - 20)  SpO2: 97% (30 May 2024 14:00) (92% - 100%)    Parameters below as of 30 May 2024 14:00  Patient On (Oxygen Delivery Method): room air      I&O's Summary    29 May 2024 07:01  -  30 May 2024 07:00  --------------------------------------------------------  IN: 160 mL / OUT: 2250 mL / NET: -2090 mL    30 May 2024 07:01  -  30 May 2024 14:56  --------------------------------------------------------  IN: 0 mL / OUT: 1067.5 mL / NET: -1067.5 mL        [X ] NO APPARENT ANESTHESIA COMPLICATIONS      Comments:

## 2024-05-30 NOTE — PHYSICAL THERAPY INITIAL EVALUATION ADULT - NSPTDISCHREC_GEN_A_CORE
Restorative rehab to address current functional limitation to optimize safety to allow Pt to reach their optimal level of function. Recommend PM&R consult
Inpatient rehabilitative services to address functional limitations associated with Anterior cervical discectomy and fusion (ACDF)

## 2024-05-30 NOTE — OCCUPATIONAL THERAPY INITIAL EVALUATION ADULT - RANGE OF MOTION EXAMINATION, UPPER EXTREMITY
except Shoulder Flexion 0-90 degrees (only assessed to 90 degrees secondary to spinal precautions)/bilateral UE Active ROM was WFL  (within functional limits)

## 2024-05-30 NOTE — PROGRESS NOTE ADULT - PROBLEM SELECTOR PLAN 1
s/p ACDF  possible nonluminal esophageal injury closed with stitch, was npo, ENT following, xray done wo pneumomediastinum, now back on reg diet  per primary team

## 2024-05-30 NOTE — PROGRESS NOTE ADULT - PROBLEM SELECTOR PLAN 1
- keep npo  - f/u cxray results  - f/u w/ ENt prior to advancing diet  - c-collar when OOB  - c/w YESSY he monitor output

## 2024-05-30 NOTE — PHYSICAL THERAPY INITIAL EVALUATION ADULT - GENERAL OBSERVATIONS, REHAB EVAL
Pt received semi-supine, +hernandez, all lines/tubes intact, NAD. HR: 76 beats per minute
Pt received in bed all lines intact NAD all precautions observed RN made aware, call bell left within reach, PT will continue to follow.

## 2024-05-30 NOTE — OCCUPATIONAL THERAPY INITIAL EVALUATION ADULT - NSOTDISCHREC_GEN_A_CORE
Patient participated in dysphagia group therapy as additional therapeutic opportunity to assess diet tolerance at meal(s) & to introduce trials to evaluate for safe diet advancement. Dysphagia compensatory strategies utilized in dysphagia group include small bites/sips. Patient is tolerating current diet. Primary SLP notified of session results. Continue established plan of care.    The patient was unmasked during dysphagia group, sitting >6 ft apart, and facing away from other unmasked patients for a duration of >15 minutes. Therapist was wearing gloves throughout dysphagia group.    [facility]

## 2024-05-30 NOTE — PROGRESS NOTE ADULT - ASSESSMENT
EKG not in chart plz obtain     Echo < from: TTE W or WO Ultrasound Enhancing Agent (05.22.24 @ 10:35) >   1. The left ventricular cavity is normal in size. Left ventricular wall thickness is normal. Left ventricular systolic function is mildly decreased with an ejection fraction visually estimated at 45 to 50%. There are regional wall motion abnormalities present. Hypokinesis of the inferolateral wall and basal inferior wall.   2. Normal right ventricular cavity size and normal systolic function.   3. Structurally normal mitral valve with normal leaflet excursion. There is calcification of the mitral valve annulus. There is trace mitral regurgitation.   4. The aortic valve appears trileaflet with normal systolic excursion. There is calcification of the aortic valve leaflets. There is mild aortic regurgitation.    < end of copied text >    Assessment and Plan     1) CAD s/p CABG : denies CP SOB with > 4 mets activity , should be in asa and lipitor if not contraindicated     2) Pre op eval Planned for anterior cervical discectomy and fusion C4 to C7, denies CP and SOB with > 4 mets of activity , RCRI class III 10.1% Risk for 30 day MACE c/w metoprolol  50 mg po daily , optimized from a cardiac standpoint , s/p cervical spine surgery doing well    3) DVT PPX lovenox

## 2024-05-30 NOTE — PHYSICAL THERAPY INITIAL EVALUATION ADULT - MANUAL MUSCLE TESTING RESULTS, REHAB EVAL
bilateral hips: 2+/5, right knee: 2+/5, right ankle: 3/5, left knee: 3-/5, left ankle: 3/5/grossly assessed due to
Upper and lower extremities grossly assessed.  Bilaterally, pt presents with a 3/5 on MMT score./grossly assessed due to

## 2024-05-30 NOTE — PHYSICAL THERAPY INITIAL EVALUATION ADULT - BED MOBILITY LIMITATIONS, REHAB EVAL
decreased ability to use arms for pushing/pulling/decreased ability to use legs for bridging/pushing
decreased ability to use legs for bridging/pushing

## 2024-05-30 NOTE — OCCUPATIONAL THERAPY INITIAL EVALUATION ADULT - DIAGNOSIS, OT EVAL
s/p Anterior cervical discectomy and fusion (ACDF) at C4-7; Decreased functional mobility; Decreased ADL management

## 2024-05-30 NOTE — OCCUPATIONAL THERAPY INITIAL EVALUATION ADULT - LIVES WITH, PROFILE
Pt. reports he lives with his best friend and their family in a house with steps to enter. Once inside, pt. reports he has a full flight of steps to negotiate to reach bedroom and bathroom. Per pt., he has a shower stall in his bathroom.

## 2024-05-30 NOTE — PROGRESS NOTE ADULT - ASSESSMENT
Mr. Mayorga is a 73-year-old male with past medical history of diabetes, spinal stenosis complicated by neurogenic bladder with chronic Salazar presenting for lower extremity weakness. now post ACDF

## 2024-05-30 NOTE — PROGRESS NOTE ADULT - ASSESSMENT
73 year old M with spinal stenosis and chronic hernandez in place due to neurogenic bladder, with progressive weakness and pain in lower extremities since 5/15. CT cervical spine reveals high-grade bilateral C2-C3, C3-C4, C5-C6, and C6-C7 foraminal stenosis, as well as high-grade C5-C6 central canal stenosis due to advanced disc and uncovertebral joint degeneration.     5/29: OR for C4-C7 ACDF  5/30: s/p C4-7 ACDF, hmv drain, pod #1, possible nonluminal esophageal injury closed with stitch, npo, ENT following, cxray done to r/o pneumomediastinum 72yo M PMHx spinal stenosis complicated by ??neurogenic bladder?? diagnosed 3 years ago in , pt says he had bladder reflux with chronic hernandez, had imaging at that time and refused surgical intervention. He has never followed in [facility] w spine surgeon. He presented with progressive weakness and pain in lower extremities since 5/15. CT cervical spine reveals high-grade bilateral C2-C3, C3-C4, C5-C6, and C6-C7 stenosis.    5/29: OR for C4-C7 ACDF  5/30: s/p C4-7 ACDF, hmv drain, pod #1, possible nonluminal esophageal injury closed with stitch, npo, ENT following, cxray done to r/o pneumomediastinum

## 2024-05-30 NOTE — CONSULT NOTE ADULT - SUBJECTIVE AND OBJECTIVE BOX
Patient is a 74y old  Male who presents with a chief complaint of BL LE weakness (30 May 2024 11:20)      HPI:  Mr. Mayorga is a 73-year-old male with past medical history of IDDM2, spinal stenosis complicated by neurogenic bladder with chronic Salazar, HTN presenting for lower extremity weakness, per pt there is always component of baseline weakness as pt has used a cane to walk in the past. This last week pt reported struggling to ambulate due to pain going down b/l lateral legs along with numbness on anterolateral surface of leg. Pt reported taking tylenol and Aleve for these symptoms but there was no symptom improvement. Pt reports having to use furniture and a walker to ambulate. Pt denies fevers, chills, chest pain, dysuria, saddle anesthesia or fecal incontinence Pt has chronic hx of urinary incontinence in the context of spinal stenosis. Pt was previous found to be ESBL + in early may (patient reports history of Proteus infection in December 2023 s/p abx with recurrence shortly thereafter).     Salazar was changed on Wednesday in the DR.    In the ED VS notable for BP of 160/80. In the ED labs  notable for  and UA + for LE, nitrates, and bacteria. Imaging was notable for cervical stenosis and lordosis and thoracolumbar spine spondylosis.  In the ED pt received Zosyn and 0.5L NS.  (19 May 2024 21:20)    patient underwent anterior cervical discectomy and fusion on 5/29/24.    REVIEW OF SYSTEMS  Constitutional - No fever, No weight loss, No fatigue  HEENT - No eye pain, No visual disturbances, No difficulty hearing, No tinnitus, No vertigo, No neck pain  Respiratory - No cough, No wheezing, No shortness of breath  Cardiovascular - No chest pain, No palpitations  Gastrointestinal - No abdominal pain, No nausea, No vomiting, No diarrhea, No constipation  Genitourinary - No dysuria, No frequency, No hematuria, No incontinence  Neurological - No headaches, No memory loss, No loss of strength, No numbness, No tremors  Skin - No itching, No rashes, No lesions   Endocrine - No temperature intolerance  Musculoskeletal - No joint pain, No joint swelling, No muscle pain  Psychiatric - No depression, No anxiety    PAST MEDICAL & SURGICAL HISTORY  Diabetes    Neurogenic bladder    Chronic indwelling Salazar catheter    Hypertension    CAD (coronary artery disease)    History of appendectomy    S/P CABG (coronary artery bypass graft)        SOCIAL HISTORY  Smoking - Denied  EtOH - Denied   Drugs - Denied    FUNCTIONAL HISTORY  Lives with friend and family in house with 1 flight of stairs to enter, 1 flight to bedroom  Independent with cane or walker    CURRENT FUNCTIONAL STATUS  5/28  Bed Mobility  Bed Mobility Training Rehab Potential: good, to achieve stated therapy goals  Bed Mobility Training Symptoms Noted During/After Treatment: none  Bed Mobility Training Rolling/Turning: supervsion  Bed Mobility Training Scooting: supervsion  Bed Mobility Training Supine-to-Sit: supervsion  Bed Mobility Training Limitations: decreased strength    Sit-Stand Transfer Training  Sit-to-Stand Transfer Training Rehab Potential: good, to achieve stated therapy goals  Sit-to-Stand Transfer Training Symptoms Noted During/After Treatment: none  Transfer Training Sit-to-Stand Transfer: contact guard;  verbal cues;  full weight-bearing   rolling walker  Transfer Training Stand-to-Sit Transfer: contact guard;  full weight-bearing   rolling walker;  verbal cues  Sit-to-Stand Transfer Training Transfer Safety Analysis: decreased balance;  decreased strength;  rolling walker    Gait Training  Gait Training Rehab Potential: good, to achieve stated therapy goals  Gait Training Symptoms Noted During/After Treatment: none  Gait Training: contact guard;  1 person assist;  nonverbal cues (demo/gestures);  verbal cues;  full weight-bearing   rolling walker;  20 feet  Gait Analysis: swing-to gait   decreased geena;  decreased strength;  impaired balance;  20 feet;  rolling walker  Gait Number of Times:: x 2  Type of Rest Type of Rest: sitting  Duration of Rest Duration of Rest: ~4 minutes     FAMILY HISTORY   Family hx of lung cancer (Father)      RECENT LABS/IMAGING  CBC Full  -  ( 30 May 2024 04:00 )  WBC Count : 9.80 K/uL  RBC Count : 4.56 M/uL  Hemoglobin : 14.1 g/dL  Hematocrit : 39.6 %  Platelet Count - Automated : 209 K/uL  Mean Cell Volume : 86.8 fL  Mean Cell Hemoglobin : 30.9 pg  Mean Cell Hemoglobin Concentration : 35.6 gm/dL  Auto Neutrophil # : x  Auto Lymphocyte # : x  Auto Monocyte # : x  Auto Eosinophil # : x  Auto Basophil # : x  Auto Neutrophil % : x  Auto Lymphocyte % : x  Auto Monocyte % : x  Auto Eosinophil % : x  Auto Basophil % : x    05-30    135  |  101  |  24<H>  ----------------------------<  274<H>  4.7   |  20<L>  |  1.04    Ca    9.9      30 May 2024 04:00  Phos  3.3     05-30  Mg     1.80     05-30      Urinalysis Basic - ( 30 May 2024 04:00 )    Color: x / Appearance: x / SG: x / pH: x  Gluc: 274 mg/dL / Ketone: x  / Bili: x / Urobili: x   Blood: x / Protein: x / Nitrite: x   Leuk Esterase: x / RBC: x / WBC x   Sq Epi: x / Non Sq Epi: x / Bacteria: x        VITALS  T(C): 36.6 (05-30-24 @ 09:37), Max: 36.9 (05-29-24 @ 22:00)  HR: 107 (05-30-24 @ 09:37) (67 - 107)  BP: 160/93 (05-30-24 @ 09:37) (118/83 - 161/91)  RR: 17 (05-30-24 @ 09:37) (12 - 20)  SpO2: 100% (05-30-24 @ 09:37) (92% - 100%)  Wt(kg): --    ALLERGIES  No Known Allergies      MEDICATIONS   acetaminophen     Tablet .. 1000 milliGRAM(s) Oral every 6 hours  artificial  tears Solution 1 Drop(s) Both EYES three times a day  bisacodyl 5 milliGRAM(s) Oral at bedtime  ceFAZolin   IVPB 2000 milliGRAM(s) IV Intermittent every 8 hours  chlorhexidine 2% Cloths 1 Application(s) Topical daily  cyclobenzaprine 5 milliGRAM(s) Oral three times a day PRN  dextrose 10% Bolus 125 milliLiter(s) IV Bolus once  dextrose 5%. 1000 milliLiter(s) IV Continuous <Continuous>  dextrose 5%. 1000 milliLiter(s) IV Continuous <Continuous>  dextrose 50% Injectable 25 Gram(s) IV Push once  dextrose 50% Injectable 12.5 Gram(s) IV Push once  dextrose Oral Gel 15 Gram(s) Oral once PRN  enalapril 20 milliGRAM(s) Oral two times a day  enalaprilat Injectable 2.5 milliGRAM(s) IV Push once  glucagon  Injectable 1 milliGRAM(s) IntraMuscular once  insulin glargine Injectable (LANTUS) 38 Unit(s) SubCutaneous at bedtime  insulin lispro (ADMELOG) corrective regimen sliding scale   SubCutaneous Before meals and at bedtime  insulin lispro Injectable (ADMELOG) 15 Unit(s) SubCutaneous three times a day before meals  lidocaine   4% Patch 1 Patch Transdermal every 24 hours  melatonin 3 milliGRAM(s) Oral at bedtime PRN  metoprolol succinate ER 50 milliGRAM(s) Oral daily  oxyCODONE    IR 5 milliGRAM(s) Oral every 6 hours PRN  polyethylene glycol 3350 17 Gram(s) Oral daily  senna 2 Tablet(s) Oral at bedtime PRN      ----------------------------------------------------------------------------------------  PHYSICAL EXAM  Constitutional - NAD, Comfortable  HEENT - NCAT, EOMI  Neck - Supple, No limited ROM  Chest - CTA bilaterally, No wheeze, No rhonchi, No crackles  Cardiovascular - RRR, S1S2, No murmurs  Abdomen - BS+, Soft, NTND  Extremities - No C/C/E, No calf tenderness   Neurologic Exam -                    Cognitive - Awake, Alert, AAO to self, place, date, year, situation     Communication - Fluent, No dysarthria     Cranial Nerves - CN 2-12 intact     Motor - No focal deficits                    LEFT    UE - ShAB 5/5, EF 5/5, EE 5/5, WE 5/5,  5/5                    RIGHT UE - ShAB 5/5, EF 5/5, EE 5/5, WE 5/5,  5/5                    LEFT    LE - HF 5/5, KE 5/5, DF 5/5, PF 5/5                    RIGHT LE - HF 5/5, KE 5/5, DF 5/5, PF 5/5        Sensory - Intact to LT     Reflexes - DTR Intact, No primitive reflexive     Coordination - FTN intact     OculoVestibular - No saccades, No nystagmus, VOR         Balance - WNL Static  Psychiatric - Mood stable, Affect WNL  ----------------------------------------------------------------------------------------  ASSESSMENT/PLAN  75 yo m s/p acdf    Pain -  DVT PPX -   Rehab -       incomplete note, consult in progress Patient is a 74y old  Male who presents with a chief complaint of BL LE weakness (30 May 2024 11:20)      HPI:  Mr. Mayorga is a 73-year-old male with past medical history of IDDM2, spinal stenosis complicated by neurogenic bladder with chronic Hernandez, HTN presenting for lower extremity weakness, per pt there is always component of baseline weakness as pt has used a cane to walk in the past. This last week pt reported struggling to ambulate due to pain going down b/l lateral legs along with numbness on anterolateral surface of leg. Pt reported taking tylenol and Aleve for these symptoms but there was no symptom improvement. Pt reports having to use furniture and a walker to ambulate. Pt denies fevers, chills, chest pain, dysuria, saddle anesthesia or fecal incontinence Pt has chronic hx of urinary incontinence in the context of spinal stenosis. Pt was previous found to be ESBL + in early may (patient reports history of Proteus infection in December 2023 s/p abx with recurrence shortly thereafter).     Hernandez was changed on Wednesday in the DR.    In the ED VS notable for BP of 160/80. In the ED labs  notable for  and UA + for LE, nitrates, and bacteria. Imaging was notable for cervical stenosis and lordosis and thoracolumbar spine spondylosis.  In the ED pt received Zosyn and 0.5L NS.  (19 May 2024 21:20)    patient underwent anterior cervical discectomy and fusion on 5/29/24.  Reports 9/10 pain currently    REVIEW OF SYSTEMS  s/p acdf  + pain   +tingling    PAST MEDICAL & SURGICAL HISTORY  Diabetes    Neurogenic bladder    Chronic indwelling Hernandez catheter    Hypertension    CAD (coronary artery disease)    History of appendectomy    S/P CABG (coronary artery bypass graft)        SOCIAL HISTORY  Smoking - Denied  EtOH - Denied   Drugs - Denied    FUNCTIONAL HISTORY  Lives with friend and friends family in house with 1 flight of stairs to enter, 1 flight to bedroom. stays on main level  Independent with cane or walker    CURRENT FUNCTIONAL STATUS  5/28  Bed Mobility  Bed Mobility Training Rehab Potential: good, to achieve stated therapy goals  Bed Mobility Training Symptoms Noted During/After Treatment: none  Bed Mobility Training Rolling/Turning: supervsion  Bed Mobility Training Scooting: supervsion  Bed Mobility Training Supine-to-Sit: supervsion  Bed Mobility Training Limitations: decreased strength    Sit-Stand Transfer Training  Sit-to-Stand Transfer Training Rehab Potential: good, to achieve stated therapy goals  Sit-to-Stand Transfer Training Symptoms Noted During/After Treatment: none  Transfer Training Sit-to-Stand Transfer: contact guard;  verbal cues;  full weight-bearing   rolling walker  Transfer Training Stand-to-Sit Transfer: contact guard;  full weight-bearing   rolling walker;  verbal cues  Sit-to-Stand Transfer Training Transfer Safety Analysis: decreased balance;  decreased strength;  rolling walker    Gait Training  Gait Training Rehab Potential: good, to achieve stated therapy goals  Gait Training Symptoms Noted During/After Treatment: none  Gait Training: contact guard;  1 person assist;  nonverbal cues (demo/gestures);  verbal cues;  full weight-bearing   rolling walker;  20 feet  Gait Analysis: swing-to gait   decreased geena;  decreased strength;  impaired balance;  20 feet;  rolling walker  Gait Number of Times:: x 2  Type of Rest Type of Rest: sitting  Duration of Rest Duration of Rest: ~4 minutes     FAMILY HISTORY   Family hx of lung cancer (Father)      RECENT LABS/IMAGING  CBC Full  -  ( 30 May 2024 04:00 )  WBC Count : 9.80 K/uL  RBC Count : 4.56 M/uL  Hemoglobin : 14.1 g/dL  Hematocrit : 39.6 %  Platelet Count - Automated : 209 K/uL  Mean Cell Volume : 86.8 fL  Mean Cell Hemoglobin : 30.9 pg  Mean Cell Hemoglobin Concentration : 35.6 gm/dL  Auto Neutrophil # : x  Auto Lymphocyte # : x  Auto Monocyte # : x  Auto Eosinophil # : x  Auto Basophil # : x  Auto Neutrophil % : x  Auto Lymphocyte % : x  Auto Monocyte % : x  Auto Eosinophil % : x  Auto Basophil % : x    05-30    135  |  101  |  24<H>  ----------------------------<  274<H>  4.7   |  20<L>  |  1.04    Ca    9.9      30 May 2024 04:00  Phos  3.3     05-30  Mg     1.80     05-30      Urinalysis Basic - ( 30 May 2024 04:00 )    Color: x / Appearance: x / SG: x / pH: x  Gluc: 274 mg/dL / Ketone: x  / Bili: x / Urobili: x   Blood: x / Protein: x / Nitrite: x   Leuk Esterase: x / RBC: x / WBC x   Sq Epi: x / Non Sq Epi: x / Bacteria: x        VITALS  T(C): 36.6 (05-30-24 @ 09:37), Max: 36.9 (05-29-24 @ 22:00)  HR: 107 (05-30-24 @ 09:37) (67 - 107)  BP: 160/93 (05-30-24 @ 09:37) (118/83 - 161/91)  RR: 17 (05-30-24 @ 09:37) (12 - 20)  SpO2: 100% (05-30-24 @ 09:37) (92% - 100%)  Wt(kg): --    ALLERGIES  No Known Allergies      MEDICATIONS   acetaminophen     Tablet .. 1000 milliGRAM(s) Oral every 6 hours  artificial  tears Solution 1 Drop(s) Both EYES three times a day  bisacodyl 5 milliGRAM(s) Oral at bedtime  ceFAZolin   IVPB 2000 milliGRAM(s) IV Intermittent every 8 hours  chlorhexidine 2% Cloths 1 Application(s) Topical daily  cyclobenzaprine 5 milliGRAM(s) Oral three times a day PRN  dextrose 10% Bolus 125 milliLiter(s) IV Bolus once  dextrose 5%. 1000 milliLiter(s) IV Continuous <Continuous>  dextrose 5%. 1000 milliLiter(s) IV Continuous <Continuous>  dextrose 50% Injectable 25 Gram(s) IV Push once  dextrose 50% Injectable 12.5 Gram(s) IV Push once  dextrose Oral Gel 15 Gram(s) Oral once PRN  enalapril 20 milliGRAM(s) Oral two times a day  enalaprilat Injectable 2.5 milliGRAM(s) IV Push once  glucagon  Injectable 1 milliGRAM(s) IntraMuscular once  insulin glargine Injectable (LANTUS) 38 Unit(s) SubCutaneous at bedtime  insulin lispro (ADMELOG) corrective regimen sliding scale   SubCutaneous Before meals and at bedtime  insulin lispro Injectable (ADMELOG) 15 Unit(s) SubCutaneous three times a day before meals  lidocaine   4% Patch 1 Patch Transdermal every 24 hours  melatonin 3 milliGRAM(s) Oral at bedtime PRN  metoprolol succinate ER 50 milliGRAM(s) Oral daily  oxyCODONE    IR 5 milliGRAM(s) Oral every 6 hours PRN  polyethylene glycol 3350 17 Gram(s) Oral daily  senna 2 Tablet(s) Oral at bedtime PRN      ----------------------------------------------------------------------------------------  PHYSICAL EXAM  Constitutional - NAD, Comfortable  HEENT - NCAT, EOMI  + anterior neck dressing  + drain  Chest - no respiratory distress  Cardiovascular - RRR, S1S2   Abdomen -  Soft, NTND  Extremities - No C/C/E, No calf tenderness   Neurologic Exam -                    Cognitive - Awake, Alert, AAO to self, place, date, year, situation     Communication - Fluent, No dysarthria     Cranial Nerves - CN 2-12 intact     Motor -                      LEFT    UE - ShAB 5/5, EF 5/5, EE 5/5, WE 5/5,  5/5                    RIGHT UE - ShAB 5/5, EF 5/5, EE 5/5, WE 5/5,  5/5                    LEFT    LE - HF 4+/5, KE 5/5, DF 5/5, PF 5/5                    RIGHT LE - HF 4+/5, KE 5/5, DF 5/5, PF 5/5        Sensory - Intact to LT     Balance - WNL Static  Psychiatric - Mood stable, Affect WNL  ----------------------------------------------------------------------------------------  ASSESSMENT/PLAN  75 yo m s/p acdf  diet- regular  Pain - tylenol prn, flexeril, lidocaine patch, oxy ir prn with bowel regimen  hernandez for neurogenic bladder  htn: toprol, enalapril  PT and OT pending post op   DVT PPX - scd  Rehab -  pending progress, patient ambulated 20' cg x 2 prior to surgery  will monitor for improvement, goal is for discharge home, however will consider rehab options if requires assistance. patient has stairs at home

## 2024-05-30 NOTE — OCCUPATIONAL THERAPY INITIAL EVALUATION ADULT - GENERAL OBSERVATIONS, REHAB EVAL
Pt. received sitting at edge of bed with RN bedside. No acute distress. Patient agreed to evaluation from Occupational Therapist. +Clean dry intact dressing to Neck, +Heplock, +Urethral Catheter, +Accordion Drain, +Cervical Collar.

## 2024-05-30 NOTE — OCCUPATIONAL THERAPY INITIAL EVALUATION ADULT - MD ORDER
Occupational Therapy (OT) to evaluate and treat. Out of bed with assistance. Per IRENA Torre, pt is okay to participate in OT evaluation and perform activity as tolerated.

## 2024-05-30 NOTE — PHYSICAL THERAPY INITIAL EVALUATION ADULT - PERTINENT HX OF CURRENT PROBLEM, REHAB EVAL
Anterior cervical discectomy and fusion (ACDF) at 3 levels
Pt is a 73-year-old male with past medical history of diabetes, spinal stenosis complicated by neurogenic bladder with chronic Salazar, presenting to the emergency department complaint of 1 week history of increasing bilateral lower extremity numbness/tingling and increased difficulty ambulating.

## 2024-05-30 NOTE — PHYSICAL THERAPY INITIAL EVALUATION ADULT - PLANNED THERAPY INTERVENTIONS, PT EVAL
balance training/bed mobility training/gait training
balance training/bed mobility training/gait training/neuromuscular re-education/strengthening/transfer training

## 2024-05-30 NOTE — PHYSICAL THERAPY INITIAL EVALUATION ADULT - DIAGNOSIS, PT EVAL
Anterior cervical discectomy and fusion (ACDF) at 3 levels
decreased strength, decreased functional mobility

## 2024-05-30 NOTE — PROGRESS NOTE ADULT - SUBJECTIVE AND OBJECTIVE BOX
True Rothman MD  Interventional Cardiology / Advance Heart Failure and Cardiac Transplant Specialist  [place] Office : [address] [place] [address] [place] [address]  Tel:   [place] Office : [address] [facility] [place] [address]  Tel: [phone]       Pt is lying in bed comfortable not in distress, no chest pains no SOB no palpitations, s/p cervical spine surgery doing well  	  MEDICATIONS:  enalapril 20 milliGRAM(s) Oral two times a day  enalaprilat Injectable 2.5 milliGRAM(s) IV Push once  metoprolol succinate ER 50 milliGRAM(s) Oral daily        acetaminophen     Tablet .. 1000 milliGRAM(s) Oral every 6 hours  cyclobenzaprine 5 milliGRAM(s) Oral three times a day PRN  melatonin 3 milliGRAM(s) Oral at bedtime PRN  oxyCODONE    IR 5 milliGRAM(s) Oral every 6 hours PRN    bisacodyl 5 milliGRAM(s) Oral at bedtime  polyethylene glycol 3350 17 Gram(s) Oral daily  senna 2 Tablet(s) Oral at bedtime PRN    dextrose 50% Injectable 12.5 Gram(s) IV Push once  dextrose 50% Injectable 25 Gram(s) IV Push once  dextrose Oral Gel 15 Gram(s) Oral once PRN  glucagon  Injectable 1 milliGRAM(s) IntraMuscular once  insulin glargine Injectable (LANTUS) 38 Unit(s) SubCutaneous at bedtime  insulin lispro (ADMELOG) corrective regimen sliding scale   SubCutaneous Before meals and at bedtime  insulin lispro Injectable (ADMELOG) 15 Unit(s) SubCutaneous three times a day before meals    artificial  tears Solution 1 Drop(s) Both EYES three times a day  chlorhexidine 2% Cloths 1 Application(s) Topical daily  dextrose 10% Bolus 125 milliLiter(s) IV Bolus once  dextrose 5%. 1000 milliLiter(s) IV Continuous <Continuous>  dextrose 5%. 1000 milliLiter(s) IV Continuous <Continuous>  lidocaine   4% Patch 1 Patch Transdermal every 24 hours      PAST MEDICAL/SURGICAL HISTORY  PAST MEDICAL & SURGICAL HISTORY:  Diabetes      Neurogenic bladder      Chronic indwelling Salazar catheter      Hypertension      CAD (coronary artery disease)      History of appendectomy      S/P CABG (coronary artery bypass graft)          SOCIAL HISTORY: Substance Use (street drugs): ( x ) never used  (  ) other:    FAMILY HISTORY:  Family hx of lung cancer (Father)      PHYSICAL EXAM:  T(C): 36.9 (05-30-24 @ 14:00), Max: 36.9 (05-29-24 @ 22:00)  HR: 90 (05-30-24 @ 14:00) (80 - 107)  BP: 144/94 (05-30-24 @ 14:00) (118/83 - 161/91)  RR: 18 (05-30-24 @ 14:00) (12 - 20)  SpO2: 97% (05-30-24 @ 14:00) (92% - 100%)  Wt(kg): --  I&O's Summary    29 May 2024 07:01  -  30 May 2024 07:00  --------------------------------------------------------  IN: 160 mL / OUT: 2250 mL / NET: -2090 mL    30 May 2024 07:01  -  30 May 2024 16:52  --------------------------------------------------------  IN: 0 mL / OUT: 1067.5 mL / NET: -1067.5 mL        GENERAL: NAD  EYES:   PERRLA   ENMT:   Moist mucous membranes, Good dentition, No lesions  Cardiovascular: Normal S1 S2, No JVD, No murmurs, No edema  Respiratory: Lungs clear to auscultation	  Gastrointestinal:  Soft, Non-tender, + BS	  Extremities: no edema                                  14.1   9.80  )-----------( 209      ( 30 May 2024 04:00 )             39.6     05-30    135  |  101  |  24<H>  ----------------------------<  274<H>  4.7   |  20<L>  |  1.04    Ca    9.9      30 May 2024 04:00  Phos  3.3     05-30  Mg     1.80     05-30      proBNP:   Lipid Profile:   HgA1c:   TSH:     Consultant(s) Notes Reviewed:  [x ] YES  [ ] NO    Care Discussed with Consultants/Other Providers [ x] YES  [ ] NO    Imaging Personally Reviewed independently:  [x] YES  [ ] NO    All labs, radiologic studies, vitals, orders and medications list reviewed. Patient is seen and examined at bedside. Case discussed with medical team.

## 2024-05-30 NOTE — PHYSICAL THERAPY INITIAL EVALUATION ADULT - ADDITIONAL COMMENTS
Pt stated he lives with his best friend and family in a house with a flight of stairs to enter and a flight of stairs to his bedroom. prior to admission Pt stated he was independent with all mobility and occasionally ambulated with a cane or a walker. Pt stated he also holds onto furniture for support.    Pt. left comfortable in bed with all tubes/lines intact, head of the bed elevated, call bell in reach and in NAD. RN aware of session and pt current position.
Pt stated he lives with his best friend and family in a house with a flight of stairs to enter and a flight of stairs to his bedroom. prior to admission Pt stated he was independent with all mobility and occasionally ambulated with a cane or a walker. Pt stated he also holds onto furniture for support.    Pt. left comfortable in bed with all tubes/lines intact, head of the bed elevated, call bell in reach and in NAD. RN aware of session and pt current position.

## 2024-05-30 NOTE — CONSULT NOTE ADULT - CONSULT REASON
CAD   Pre op eval
b/l lower extremity weakness
Rehabilitation consult
eval for rehab
c/f esophageal injury
UCX +

## 2024-05-30 NOTE — PHYSICAL THERAPY INITIAL EVALUATION ADULT - GAIT DISTANCE, PT EVAL
15 feet
Pt reporting some numbness within  both ower extremities.  Pt presenting with Lower extremity weakness during ambulating.  Slightly unsteady on feet.  Difficulty engaging in stance phase of gait.  Will continue to monitor patients functional abilities./25 feet

## 2024-05-30 NOTE — OCCUPATIONAL THERAPY INITIAL EVALUATION ADULT - PRECAUTIONS/LIMITATIONS, REHAB EVAL
+Cervical Collar only needs to be worn when OOB/fall precautions/spinal precautions/surgical precautions

## 2024-05-31 ENCOUNTER — TRANSCRIPTION ENCOUNTER (OUTPATIENT)
Age: 74
End: 2024-05-31

## 2024-05-31 DIAGNOSIS — Z98.1 ARTHRODESIS STATUS: ICD-10-CM

## 2024-05-31 LAB
GLUCOSE BLDC GLUCOMTR-MCNC: 142 MG/DL — HIGH (ref 70–99)
GLUCOSE BLDC GLUCOMTR-MCNC: 174 MG/DL — HIGH (ref 70–99)
GLUCOSE BLDC GLUCOMTR-MCNC: 221 MG/DL — HIGH (ref 70–99)
GLUCOSE BLDC GLUCOMTR-MCNC: 231 MG/DL — HIGH (ref 70–99)

## 2024-05-31 PROCEDURE — 99232 SBSQ HOSP IP/OBS MODERATE 35: CPT

## 2024-05-31 PROCEDURE — 72040 X-RAY EXAM NECK SPINE 2-3 VW: CPT | Mod: 26

## 2024-05-31 RX ORDER — INSULIN GLARGINE 100 [IU]/ML
40 INJECTION, SOLUTION SUBCUTANEOUS AT BEDTIME
Refills: 0 | Status: DISCONTINUED | OUTPATIENT
Start: 2024-05-31 | End: 2024-06-04

## 2024-05-31 RX ORDER — LIDOCAINE 4 G/100G
1 CREAM TOPICAL ONCE
Refills: 0 | Status: COMPLETED | OUTPATIENT
Start: 2024-05-31 | End: 2024-05-31

## 2024-05-31 RX ORDER — LIDOCAINE 4 G/100G
1 CREAM TOPICAL EVERY 24 HOURS
Refills: 0 | Status: DISCONTINUED | OUTPATIENT
Start: 2024-05-31 | End: 2024-06-02

## 2024-05-31 RX ORDER — TRAMADOL HYDROCHLORIDE 50 MG/1
50 TABLET ORAL ONCE
Refills: 0 | Status: DISCONTINUED | OUTPATIENT
Start: 2024-05-31 | End: 2024-05-31

## 2024-05-31 RX ORDER — INSULIN LISPRO 100/ML
16 VIAL (ML) SUBCUTANEOUS
Refills: 0 | Status: DISCONTINUED | OUTPATIENT
Start: 2024-05-31 | End: 2024-06-04

## 2024-05-31 RX ORDER — BENZOCAINE AND MENTHOL 5; 1 G/100ML; G/100ML
1 LIQUID ORAL
Refills: 0 | Status: DISCONTINUED | OUTPATIENT
Start: 2024-05-31 | End: 2024-06-04

## 2024-05-31 RX ADMIN — Medication 20 MILLIGRAM(S): at 18:01

## 2024-05-31 RX ADMIN — LIDOCAINE 1 PATCH: 4 CREAM TOPICAL at 22:56

## 2024-05-31 RX ADMIN — Medication 1000 MILLIGRAM(S): at 19:21

## 2024-05-31 RX ADMIN — TRAMADOL HYDROCHLORIDE 50 MILLIGRAM(S): 50 TABLET ORAL at 02:19

## 2024-05-31 RX ADMIN — INSULIN GLARGINE 40 UNIT(S): 100 INJECTION, SOLUTION SUBCUTANEOUS at 22:16

## 2024-05-31 RX ADMIN — Medication 16 UNIT(S): at 17:12

## 2024-05-31 RX ADMIN — LIDOCAINE 1 PATCH: 4 CREAM TOPICAL at 19:30

## 2024-05-31 RX ADMIN — Medication 1000 MILLIGRAM(S): at 23:00

## 2024-05-31 RX ADMIN — Medication 2: at 11:48

## 2024-05-31 RX ADMIN — LIDOCAINE 1 PATCH: 4 CREAM TOPICAL at 02:42

## 2024-05-31 RX ADMIN — LIDOCAINE 1 PATCH: 4 CREAM TOPICAL at 02:01

## 2024-05-31 RX ADMIN — TRAMADOL HYDROCHLORIDE 50 MILLIGRAM(S): 50 TABLET ORAL at 11:35

## 2024-05-31 RX ADMIN — Medication 1000 MILLIGRAM(S): at 05:17

## 2024-05-31 RX ADMIN — TRAMADOL HYDROCHLORIDE 50 MILLIGRAM(S): 50 TABLET ORAL at 03:19

## 2024-05-31 RX ADMIN — Medication 2: at 07:16

## 2024-05-31 RX ADMIN — Medication 15 UNIT(S): at 07:17

## 2024-05-31 RX ADMIN — Medication 1000 MILLIGRAM(S): at 16:21

## 2024-05-31 RX ADMIN — LIDOCAINE 1 PATCH: 4 CREAM TOPICAL at 14:00

## 2024-05-31 RX ADMIN — Medication 20 MILLIGRAM(S): at 05:17

## 2024-05-31 RX ADMIN — CHLORHEXIDINE GLUCONATE 1 APPLICATION(S): 213 SOLUTION TOPICAL at 15:11

## 2024-05-31 RX ADMIN — Medication 50 MILLIGRAM(S): at 05:17

## 2024-05-31 RX ADMIN — Medication 16 UNIT(S): at 11:49

## 2024-05-31 RX ADMIN — Medication 5 MILLIGRAM(S): at 22:16

## 2024-05-31 RX ADMIN — LIDOCAINE 1 PATCH: 4 CREAM TOPICAL at 07:05

## 2024-05-31 RX ADMIN — LIDOCAINE 1 PATCH: 4 CREAM TOPICAL at 10:02

## 2024-05-31 RX ADMIN — TRAMADOL HYDROCHLORIDE 50 MILLIGRAM(S): 50 TABLET ORAL at 10:35

## 2024-05-31 RX ADMIN — Medication 1: at 22:15

## 2024-05-31 RX ADMIN — Medication 1 DROP(S): at 22:16

## 2024-05-31 RX ADMIN — Medication 1 DROP(S): at 13:24

## 2024-05-31 RX ADMIN — Medication 1000 MILLIGRAM(S): at 01:22

## 2024-05-31 RX ADMIN — Medication 650 MILLIGRAM(S): at 00:22

## 2024-05-31 RX ADMIN — Medication 1000 MILLIGRAM(S): at 23:30

## 2024-05-31 RX ADMIN — LIDOCAINE 1 PATCH: 4 CREAM TOPICAL at 22:51

## 2024-05-31 NOTE — DISCHARGE NOTE NURSING/CASE MANAGEMENT/SOCIAL WORK - PATIENT PORTAL LINK FT
You can access the FollowMyHealth Patient Portal offered by [facility] by registering at the following website: [email or web address]. By joining [facility]’s FollowMyHealth portal, you will also be able to view your health information using other applications (apps) compatible with our system.

## 2024-05-31 NOTE — DISCHARGE NOTE NURSING/CASE MANAGEMENT/SOCIAL WORK - NSDCPNINST_GEN_ALL_CORE
Notify Dr patterson if you experience any increase in pain not relieved with medication, any redness, drainage or swelling around incision, any numbness or tingling in extremities or any fever >100.5.  Drink plenty of fluids.  no heavy lifting or straining, pushing or pulling.  use over the counter stool softeners to assist with constipation.  Continue to follow a consistent carbohydrate diet and follow up with PMD for continued management of your diabetes.   Notify Dr patterson if you experience any increase in pain not relieved with medication, any redness, drainage or swelling around incision, any numbness or tingling in extremities or any fever >100.5.  Drink plenty of fluids.  no heavy lifting or straining, pushing or pulling.  use over the counter stool softeners to assist with constipation.  Continue to follow a consistent carbohydrate diet and follow up with PMD for continued management of your diabetes.  Salazar care as instructed, use leg bag during the day and large drainage bag at night.  Remember good hand washing to prevent any spread of infection.

## 2024-05-31 NOTE — PROGRESS NOTE ADULT - ASSESSMENT
72yo M PMHx spinal stenosis complicated by ??neurogenic bladder?? diagnosed 3 years ago in [place], pt says he had bladder reflux with chronic hernandez, had imaging at that time and refused surgical intervention. He has never followed in [facility] w spine surgeon. He presented with progressive weakness and pain in lower extremities since 5/15. CT cervical spine reveals high-grade bilateral C2-C3, C3-C4, C5-C6, and C6-C7 stenosis.    5/29: OR for C4-C7 ACDF  5/30: s/p C4-7 ACDF, hmv drain, pod #1, possible nonluminal esophageal injury closed with stitch, npo, ENT following, cxray done to r/o pneumomediastinum  5/31: Stable exam POD # 2 S/P  C4-7 ACDF, HMV X 1. CXR showed no pneumomediastinum. Initial evaluation done by PM&R

## 2024-05-31 NOTE — PROGRESS NOTE ADULT - SUBJECTIVE AND OBJECTIVE BOX
True Rothman MD  Interventional Cardiology / Advance Heart Failure and Cardiac Transplant Specialist  [place] Office : [phone] [place] [address] [place] [address]  Tel:   [place] Office : [address] [facility] [place] [address]  Tel: [phone]       Pt is lying in bed comfortable not in distress, no chest pains no SOB no palpitations, s/p cervical spine surgery doing well    	  MEDICATIONS:  enalapril 20 milliGRAM(s) Oral two times a day  enalaprilat Injectable 2.5 milliGRAM(s) IV Push once  metoprolol succinate ER 50 milliGRAM(s) Oral daily        acetaminophen   Oral Liquid .. 1000 milliGRAM(s) Oral every 6 hours  cyclobenzaprine 5 milliGRAM(s) Oral three times a day PRN  melatonin 3 milliGRAM(s) Oral at bedtime PRN  traMADol 50 milliGRAM(s) Oral every 6 hours PRN    bisacodyl 5 milliGRAM(s) Oral at bedtime  polyethylene glycol 3350 17 Gram(s) Oral daily  senna 2 Tablet(s) Oral at bedtime PRN    dextrose 50% Injectable 12.5 Gram(s) IV Push once  dextrose 50% Injectable 25 Gram(s) IV Push once  dextrose Oral Gel 15 Gram(s) Oral once PRN  glucagon  Injectable 1 milliGRAM(s) IntraMuscular once  insulin glargine Injectable (LANTUS) 40 Unit(s) SubCutaneous at bedtime  insulin lispro (ADMELOG) corrective regimen sliding scale   SubCutaneous Before meals and at bedtime  insulin lispro Injectable (ADMELOG) 16 Unit(s) SubCutaneous three times a day before meals    artificial  tears Solution 1 Drop(s) Both EYES three times a day  benzocaine/menthol Lozenge 1 Lozenge Oral every 2 hours PRN  chlorhexidine 2% Cloths 1 Application(s) Topical daily  dextrose 10% Bolus 125 milliLiter(s) IV Bolus once  dextrose 5%. 1000 milliLiter(s) IV Continuous <Continuous>  dextrose 5%. 1000 milliLiter(s) IV Continuous <Continuous>  lidocaine   4% Patch 1 Patch Transdermal every 24 hours  lidocaine   4% Patch 1 Patch Transdermal every 24 hours      PAST MEDICAL/SURGICAL HISTORY  PAST MEDICAL & SURGICAL HISTORY:  Diabetes      Neurogenic bladder      Chronic indwelling Salazar catheter      Hypertension      CAD (coronary artery disease)      History of appendectomy      S/P CABG (coronary artery bypass graft)          SOCIAL HISTORY: Substance Use (street drugs): ( x ) never used  (  ) other:    FAMILY HISTORY:  Family hx of lung cancer (Father)            PHYSICAL EXAM:  T(C): 36.7 (05-31-24 @ 09:50), Max: 37 (05-30-24 @ 21:07)  HR: 78 (05-31-24 @ 09:50) (76 - 84)  BP: 142/82 (05-31-24 @ 09:50) (142/82 - 144/70)  RR: 17 (05-31-24 @ 09:50) (17 - 18)  SpO2: 98% (05-31-24 @ 09:50) (96% - 98%)  Wt(kg): --  I&O's Summary    30 May 2024 07:01  -  31 May 2024 07:00  --------------------------------------------------------  IN: 0 mL / OUT: 3524 mL / NET: -3524 mL    31 May 2024 07:01  -  31 May 2024 15:28  --------------------------------------------------------  IN: 0 mL / OUT: 1075 mL / NET: -1075 mL        GENERAL: NAD  EYES:   PERRLA   ENMT:   Moist mucous membranes, Good dentition, No lesions  Cardiovascular: Normal S1 S2, No JVD, No murmurs, No edema  Respiratory: Lungs clear to auscultation	  Gastrointestinal:  Soft, Non-tender, + BS	  Extremities: no edema                                  14.1   9.80  )-----------( 209      ( 30 May 2024 04:00 )             39.6     05-30    135  |  101  |  24<H>  ----------------------------<  274<H>  4.7   |  20<L>  |  1.04    Ca    9.9      30 May 2024 04:00  Phos  3.3     05-30  Mg     1.80     05-30      proBNP:   Lipid Profile:   HgA1c:   TSH:     Consultant(s) Notes Reviewed:  [x ] YES  [ ] NO    Care Discussed with Consultants/Other Providers [ x] YES  [ ] NO    Imaging Personally Reviewed independently:  [x] YES  [ ] NO    All labs, radiologic studies, vitals, orders and medications list reviewed. Patient is seen and examined at bedside. Case discussed with medical team.

## 2024-05-31 NOTE — PROGRESS NOTE ADULT - SUBJECTIVE AND OBJECTIVE BOX
C/O incisional and throat pain  Patient extremely anxious  Seen by PM&R  Vital Signs Last 24 Hrs  T(C): 37 (30 May 2024 21:07), Max: 37 (30 May 2024 21:07)  T(F): 98.6 (30 May 2024 21:07), Max: 98.6 (30 May 2024 21:07)  HR: 80 (30 May 2024 21:07) (80 - 107)  BP: 143/92 (30 May 2024 21:07) (137/76 - 161/91)  BP(mean): 92 (30 May 2024 04:24) (92 - 108)  RR: 18 (30 May 2024 21:07) (15 - 20)  SpO2: 96% (30 May 2024 21:07) (96% - 100%)    Parameters below as of 30 May 2024 21:07  Patient On (Oxygen Delivery Method): room air    AAO X 3  PERRLA, EOMI  Face symmetric  LOJA strength 5/5  SILT    HMV: 20.5cc    MEDICATIONS  (STANDING):  acetaminophen   Oral Liquid .. 1000 milliGRAM(s) Oral every 6 hours  artificial  tears Solution 1 Drop(s) Both EYES three times a day  bisacodyl 5 milliGRAM(s) Oral at bedtime  chlorhexidine 2% Cloths 1 Application(s) Topical daily  dextrose 10% Bolus 125 milliLiter(s) IV Bolus once  dextrose 5%. 1000 milliLiter(s) (100 mL/Hr) IV Continuous <Continuous>  dextrose 5%. 1000 milliLiter(s) (50 mL/Hr) IV Continuous <Continuous>  dextrose 50% Injectable 12.5 Gram(s) IV Push once  dextrose 50% Injectable 25 Gram(s) IV Push once  enalapril 20 milliGRAM(s) Oral two times a day  enalaprilat Injectable 2.5 milliGRAM(s) IV Push once  glucagon  Injectable 1 milliGRAM(s) IntraMuscular once  insulin glargine Injectable (LANTUS) 38 Unit(s) SubCutaneous at bedtime  insulin lispro (ADMELOG) corrective regimen sliding scale   SubCutaneous Before meals and at bedtime  insulin lispro Injectable (ADMELOG) 15 Unit(s) SubCutaneous three times a day before meals  lidocaine   4% Patch 1 Patch Transdermal every 24 hours  metoprolol succinate ER 50 milliGRAM(s) Oral daily  polyethylene glycol 3350 17 Gram(s) Oral daily  traMADol 50 milliGRAM(s) Oral once    MEDICATIONS  (PRN):  benzocaine/menthol Lozenge 1 Lozenge Oral every 2 hours PRN Sore Throat  cyclobenzaprine 5 milliGRAM(s) Oral three times a day PRN Muscle Spasm  dextrose Oral Gel 15 Gram(s) Oral once PRN Blood Glucose LESS THAN 70 milliGRAM(s)/deciliter  melatonin 3 milliGRAM(s) Oral at bedtime PRN Insomnia  senna 2 Tablet(s) Oral at bedtime PRN Constipation  traMADol 50 milliGRAM(s) Oral every 6 hours PRN Moderate Pain (4 - 6)                          14.1   9.80  )-----------( 209      ( 30 May 2024 04:00 )             39.6     05-30    135  |  101  |  24<H>  ----------------------------<  274<H>  4.7   |  20<L>  |  1.04    Ca    9.9      30 May 2024 04:00  Phos  3.3     05-30  Mg     1.80     05-30    < from: Xray Chest 1 View-PORTABLE IMMEDIATE (Xray Chest 1 View-PORTABLE IMMEDIATE .) (05.29.24 @ 23:47) >  FINDINGS:  Support devices: Cervical spinal fusion hardware. Median sternotomy with   the 2 fractured wires, unchanged. Mediastinal surgical clips.  The cardiomediastinal silhouette is  normal in size. No pneumomediastinum.  Low lung volumes. Clear lungs.  There is no pneumothorax or pleural effusion.  No acute bony abnormality.    IMPRESSION:  No pneumomediastinum.  Clear lungs.    --- End of Report ---    < end of copied text >

## 2024-05-31 NOTE — PROGRESS NOTE ADULT - SUBJECTIVE AND OBJECTIVE BOX
Patient is a 74y old  Male who presents with a chief complaint of BL LE weakness (31 May 2024 02:22)      SUBJECTIVE / OVERNIGHT EVENTS: sx incision site pain - otherwise feels well, marycruz po - afebrile     ROS:  No HA/DZ  No Vision changes   No CP, SOB  No N/V/D  No Edema  No Rash      MEDICATIONS  (STANDING):  acetaminophen   Oral Liquid .. 1000 milliGRAM(s) Oral every 6 hours  artificial  tears Solution 1 Drop(s) Both EYES three times a day  bisacodyl 5 milliGRAM(s) Oral at bedtime  chlorhexidine 2% Cloths 1 Application(s) Topical daily  dextrose 10% Bolus 125 milliLiter(s) IV Bolus once  dextrose 5%. 1000 milliLiter(s) (50 mL/Hr) IV Continuous <Continuous>  dextrose 5%. 1000 milliLiter(s) (100 mL/Hr) IV Continuous <Continuous>  dextrose 50% Injectable 12.5 Gram(s) IV Push once  dextrose 50% Injectable 25 Gram(s) IV Push once  enalapril 20 milliGRAM(s) Oral two times a day  enalaprilat Injectable 2.5 milliGRAM(s) IV Push once  glucagon  Injectable 1 milliGRAM(s) IntraMuscular once  insulin glargine Injectable (LANTUS) 38 Unit(s) SubCutaneous at bedtime  insulin lispro (ADMELOG) corrective regimen sliding scale   SubCutaneous Before meals and at bedtime  insulin lispro Injectable (ADMELOG) 15 Unit(s) SubCutaneous three times a day before meals  lidocaine   4% Patch 1 Patch Transdermal every 24 hours  lidocaine   4% Patch 1 Patch Transdermal every 24 hours  metoprolol succinate ER 50 milliGRAM(s) Oral daily  polyethylene glycol 3350 17 Gram(s) Oral daily    MEDICATIONS  (PRN):  benzocaine/menthol Lozenge 1 Lozenge Oral every 2 hours PRN Sore Throat  cyclobenzaprine 5 milliGRAM(s) Oral three times a day PRN Muscle Spasm  dextrose Oral Gel 15 Gram(s) Oral once PRN Blood Glucose LESS THAN 70 milliGRAM(s)/deciliter  melatonin 3 milliGRAM(s) Oral at bedtime PRN Insomnia  senna 2 Tablet(s) Oral at bedtime PRN Constipation  traMADol 50 milliGRAM(s) Oral every 6 hours PRN Moderate Pain (4 - 6)      T(C): 36.7 (05-31-24 @ 09:50)  HR: 78 (05-31-24 @ 09:50)  BP: 142/82 (05-31-24 @ 09:50)  RR: 17 (05-31-24 @ 09:50)  SpO2: 98% (05-31-24 @ 09:50)  CAPILLARY BLOOD GLUCOSE      POCT Blood Glucose.: 231 mg/dL (31 May 2024 07:03)  POCT Blood Glucose.: 244 mg/dL (30 May 2024 22:19)  POCT Blood Glucose.: 241 mg/dL (30 May 2024 16:32)  POCT Blood Glucose.: 224 mg/dL (30 May 2024 11:25)    I&O's Summary    30 May 2024 07:01  -  31 May 2024 07:00  --------------------------------------------------------  IN: 0 mL / OUT: 3524 mL / NET: -3524 mL    31 May 2024 07:01  -  31 May 2024 10:35  --------------------------------------------------------  IN: 0 mL / OUT: 575 mL / NET: -575 mL        PHYSICAL EXAM:  GENERAL: NAD  NECK: dressing c.d.i   CHEST/LUNG: Clear to auscultation bilaterally  HEART: Regular rate and rhythm; No murmurs, rubs, or gallops, No Edema  ABDOMEN: Soft, Nontender, Nondistended; Bowel sounds present  EXTREMITIES:  2+ Peripheral Pulses, No clubbing, cyanosis      LABS:                        14.1   9.80  )-----------( 209      ( 30 May 2024 04:00 )             39.6     05-30    135  |  101  |  24<H>  ----------------------------<  274<H>  4.7   |  20<L>  |  1.04    Ca    9.9      30 May 2024 04:00  Phos  3.3     05-30  Mg     1.80     05-30            Urinalysis Basic - ( 30 May 2024 04:00 )    Color: x / Appearance: x / SG: x / pH: x  Gluc: 274 mg/dL / Ketone: x  / Bili: x / Urobili: x   Blood: x / Protein: x / Nitrite: x   Leuk Esterase: x / RBC: x / WBC x   Sq Epi: x / Non Sq Epi: x / Bacteria: x            RADIOLOGY & ADDITIONAL TESTS:    Imaging Personally Reviewed:    Consultant(s) Notes Reviewed:      Care Discussed with Consultants/Other Providers:

## 2024-05-31 NOTE — PROGRESS NOTE ADULT - SUBJECTIVE AND OBJECTIVE BOX
Patient is a 74y old  Male who presents with a chief complaint of BL LE weakness (31 May 2024 15:28)      HPI:  Mr. Mayorga is a 73-year-old male with past medical history of IDDM2, spinal stenosis complicated by neurogenic bladder with chronic Hernandez, HTN presenting for lower extremity weakness, per pt there is always component of baseline weakness as pt has used a cane to walk in the past. This last week pt reported struggling to ambulate due to pain going down b/l lateral legs along with numbness on anterolateral surface of leg. Pt reported taking tylenol and Aleve for these symptoms but there was no symptom improvement. Pt reports having to use furniture and a walker to ambulate. Pt denies fevers, chills, chest pain, dysuria, saddle anesthesia or fecal incontinence Pt has chronic hx of urinary incontinence in the context of spinal stenosis. Pt was previous found to be ESBL + in early may (patient reports history of Proteus infection in December 2023 s/p abx with recurrence shortly thereafter).     Hernandez was changed on Wednesday in the DR.    In the ED VS notable for BP of 160/80. In the ED labs  notable for  and UA + for LE, nitrates, and bacteria. Imaging was notable for cervical stenosis and lordosis and thoracolumbar spine spondylosis.  In the ED pt received Zosyn and 0.5L NS.  (19 May 2024 21:20)      REVIEW OF SYSTEMS  pain  weakness    PAST MEDICAL & SURGICAL HISTORY  Diabetes    Neurogenic bladder    Chronic indwelling Hernandez catheter    Hypertension    CAD (coronary artery disease)    History of appendectomy    S/P CABG (coronary artery bypass graft)     CURRENT FUNCTIONAL STATUS  Therapeutic Interventions      Bed Mobility  Bed Mobility Training Rehab Potential: good, to achieve stated therapy goals  Bed Mobility Training Symptoms Noted During/After Treatment: none  Bed Mobility Training Rolling/Turning: supervsion  Bed Mobility Training Scooting: supervsion  Bed Mobility Training Sit-to-Supine: minimum assist (75% patient effort);  1 person assist  Bed Mobility Training Supine-to-Sit: minimum assist (75% patient effort);  1 person assist  Bed Mobility Training Limitations: decreased ability to use legs for bridging/pushing;  decreased ability to use arms for pushing/pulling;  decreased strength    Sit-Stand Transfer Training  Sit-to-Stand Transfer Training Rehab Potential: good, to achieve stated therapy goals  Sit-to-Stand Transfer Training Symptoms Noted During/After Treatment: none  Transfer Training Sit-to-Stand Transfer: contact guard;  minimum assist (75% patient effort);  1 person assist;  weight-bearing as tolerated   rolling walker  Transfer Training Stand-to-Sit Transfer: minimum assist (75% patient effort);  1 person assist;  weight-bearing as tolerated   rolling walker  Sit-to-Stand Transfer Training Transfer Safety Analysis: decreased step length;  decreased strength;  rolling walker    Gait Training  Gait Training Rehab Potential: fair, will monitor progress closely  Gait Training Symptoms Noted During/After Treatment: none  Gait Training: minimum assist (75% patient effort);  1 person assist;  weight-bearing as tolerated   rolling walker  Gait Analysis: 3-point gait   decreased strength;  impaired balance;  50 feet;  rolling walker  Brace/Orthotics Brace/Orthotics: cervical collar  Gait Number of Times:: x 1          FAMILY HISTORY   Family hx of lung cancer (Father)        RECENT LABS/IMAGING  CBC Full  -  ( 30 May 2024 04:00 )  WBC Count : 9.80 K/uL  RBC Count : 4.56 M/uL  Hemoglobin : 14.1 g/dL  Hematocrit : 39.6 %  Platelet Count - Automated : 209 K/uL  Mean Cell Volume : 86.8 fL  Mean Cell Hemoglobin : 30.9 pg  Mean Cell Hemoglobin Concentration : 35.6 gm/dL  Auto Neutrophil # : x  Auto Lymphocyte # : x  Auto Monocyte # : x  Auto Eosinophil # : x  Auto Basophil # : x  Auto Neutrophil % : x  Auto Lymphocyte % : x  Auto Monocyte % : x  Auto Eosinophil % : x  Auto Basophil % : x    05-30    135  |  101  |  24<H>  ----------------------------<  274<H>  4.7   |  20<L>  |  1.04    Ca    9.9      30 May 2024 04:00  Phos  3.3     05-30  Mg     1.80     05-30      Urinalysis Basic - ( 30 May 2024 04:00 )    Color: x / Appearance: x / SG: x / pH: x  Gluc: 274 mg/dL / Ketone: x  / Bili: x / Urobili: x   Blood: x / Protein: x / Nitrite: x   Leuk Esterase: x / RBC: x / WBC x   Sq Epi: x / Non Sq Epi: x / Bacteria: x        VITALS  T(C): 36.7 (05-31-24 @ 09:50), Max: 37 (05-30-24 @ 21:07)  HR: 78 (05-31-24 @ 09:50) (76 - 84)  BP: 142/82 (05-31-24 @ 09:50) (142/82 - 144/70)  RR: 17 (05-31-24 @ 09:50) (17 - 18)  SpO2: 98% (05-31-24 @ 09:50) (96% - 98%)  Wt(kg): --    ALLERGIES  No Known Allergies      MEDICATIONS   acetaminophen   Oral Liquid .. 1000 milliGRAM(s) Oral every 6 hours  artificial  tears Solution 1 Drop(s) Both EYES three times a day  benzocaine/menthol Lozenge 1 Lozenge Oral every 2 hours PRN  bisacodyl 5 milliGRAM(s) Oral at bedtime  chlorhexidine 2% Cloths 1 Application(s) Topical daily  cyclobenzaprine 5 milliGRAM(s) Oral three times a day PRN  dextrose 10% Bolus 125 milliLiter(s) IV Bolus once  dextrose 5%. 1000 milliLiter(s) IV Continuous <Continuous>  dextrose 5%. 1000 milliLiter(s) IV Continuous <Continuous>  dextrose 50% Injectable 12.5 Gram(s) IV Push once  dextrose 50% Injectable 25 Gram(s) IV Push once  dextrose Oral Gel 15 Gram(s) Oral once PRN  enalapril 20 milliGRAM(s) Oral two times a day  enalaprilat Injectable 2.5 milliGRAM(s) IV Push once  glucagon  Injectable 1 milliGRAM(s) IntraMuscular once  insulin glargine Injectable (LANTUS) 40 Unit(s) SubCutaneous at bedtime  insulin lispro (ADMELOG) corrective regimen sliding scale   SubCutaneous Before meals and at bedtime  insulin lispro Injectable (ADMELOG) 16 Unit(s) SubCutaneous three times a day before meals  lidocaine   4% Patch 1 Patch Transdermal every 24 hours  lidocaine   4% Patch 1 Patch Transdermal every 24 hours  melatonin 3 milliGRAM(s) Oral at bedtime PRN  metoprolol succinate ER 50 milliGRAM(s) Oral daily  polyethylene glycol 3350 17 Gram(s) Oral daily  senna 2 Tablet(s) Oral at bedtime PRN  traMADol 50 milliGRAM(s) Oral every 6 hours PRN      ----------------------------------------------------------------------------------------  PHYSICAL EXAM   PHYSICAL EXAM  Constitutional - NAD, Comfortable  HEENT - NCAT, EOMI  + anterior neck dressing   Chest - no respiratory distress  Cardiovascular - RRR, S1S2   Abdomen -  Soft, NTND  Extremities - No C/C/E, No calf tenderness   Neurologic Exam -                    Cognitive - Awake, Alert, AAO to self, place, date, year, situation     Communication - Fluent, No dysarthria     Cranial Nerves - CN 2-12 intact     Motor -                      LEFT    UE - ShAB 5/5, EF 5/5, EE 5/5, WE 5/5,  5/5                    RIGHT UE - ShAB 5/5, EF 5/5, EE 5/5, WE 5/5,  5/5                    LEFT    LE - HF 4+/5, KE 5/5, DF 5/5, PF 5/5                    RIGHT LE - HF 4+/5, KE 5/5, DF 5/5, PF 5/5        Sensory - Intact to LT     Balance - WNL Static  Psychiatric - Mood stable, Affect WNL  ----------------------------------------------------------------------------------------  ASSESSMENT/PLAN  75 yo m s/p acdf  diet- regular  Pain - tylenol prn, flexeril, lidocaine patch, oxy ir prn with bowel regimen  hernandez for neurogenic bladder  htn: toprol, enalapril  continue bedside PT and OT    DVT PPX - scd  Rehab -  pending progress, patient now requires cg/ min assist.  anticipate inpatient rehab, will monitor for improvement, patient states he will consider rehab if needed. Patient lives with a friend, in home with stairs, but friend is not able to provide assistance most of the time.   Goal is for acute rehab if patient agreeable, will continue to monitor

## 2024-06-01 LAB
GLUCOSE BLDC GLUCOMTR-MCNC: 187 MG/DL — HIGH (ref 70–99)
GLUCOSE BLDC GLUCOMTR-MCNC: 208 MG/DL — HIGH (ref 70–99)
GLUCOSE BLDC GLUCOMTR-MCNC: 254 MG/DL — HIGH (ref 70–99)
GLUCOSE BLDC GLUCOMTR-MCNC: 266 MG/DL — HIGH (ref 70–99)

## 2024-06-01 PROCEDURE — 99232 SBSQ HOSP IP/OBS MODERATE 35: CPT

## 2024-06-01 RX ORDER — ACETAMINOPHEN 500 MG
650 TABLET ORAL EVERY 6 HOURS
Refills: 0 | Status: DISCONTINUED | OUTPATIENT
Start: 2024-06-01 | End: 2024-06-02

## 2024-06-01 RX ORDER — ENOXAPARIN SODIUM 100 MG/ML
40 INJECTION SUBCUTANEOUS EVERY 24 HOURS
Refills: 0 | Status: DISCONTINUED | OUTPATIENT
Start: 2024-06-01 | End: 2024-06-04

## 2024-06-01 RX ORDER — PANTOPRAZOLE SODIUM 20 MG/1
40 TABLET, DELAYED RELEASE ORAL
Refills: 0 | Status: DISCONTINUED | OUTPATIENT
Start: 2024-06-01 | End: 2024-06-04

## 2024-06-01 RX ADMIN — Medication 1 DROP(S): at 06:43

## 2024-06-01 RX ADMIN — TRAMADOL HYDROCHLORIDE 50 MILLIGRAM(S): 50 TABLET ORAL at 03:18

## 2024-06-01 RX ADMIN — LIDOCAINE 1 PATCH: 4 CREAM TOPICAL at 13:16

## 2024-06-01 RX ADMIN — Medication 20 MILLIGRAM(S): at 06:44

## 2024-06-01 RX ADMIN — Medication 650 MILLIGRAM(S): at 19:00

## 2024-06-01 RX ADMIN — Medication 1: at 08:06

## 2024-06-01 RX ADMIN — TRAMADOL HYDROCHLORIDE 50 MILLIGRAM(S): 50 TABLET ORAL at 13:12

## 2024-06-01 RX ADMIN — Medication 5 MILLIGRAM(S): at 22:14

## 2024-06-01 RX ADMIN — TRAMADOL HYDROCHLORIDE 50 MILLIGRAM(S): 50 TABLET ORAL at 21:46

## 2024-06-01 RX ADMIN — LIDOCAINE 1 PATCH: 4 CREAM TOPICAL at 22:11

## 2024-06-01 RX ADMIN — Medication 16 UNIT(S): at 08:07

## 2024-06-01 RX ADMIN — INSULIN GLARGINE 40 UNIT(S): 100 INJECTION, SOLUTION SUBCUTANEOUS at 22:13

## 2024-06-01 RX ADMIN — Medication 1000 MILLIGRAM(S): at 06:42

## 2024-06-01 RX ADMIN — TRAMADOL HYDROCHLORIDE 50 MILLIGRAM(S): 50 TABLET ORAL at 12:12

## 2024-06-01 RX ADMIN — Medication 650 MILLIGRAM(S): at 18:00

## 2024-06-01 RX ADMIN — CHLORHEXIDINE GLUCONATE 1 APPLICATION(S): 213 SOLUTION TOPICAL at 18:00

## 2024-06-01 RX ADMIN — Medication 3: at 12:01

## 2024-06-01 RX ADMIN — Medication 50 MILLIGRAM(S): at 06:44

## 2024-06-01 RX ADMIN — Medication 1000 MILLIGRAM(S): at 07:42

## 2024-06-01 RX ADMIN — Medication 16 UNIT(S): at 16:48

## 2024-06-01 RX ADMIN — LIDOCAINE 1 PATCH: 4 CREAM TOPICAL at 04:35

## 2024-06-01 RX ADMIN — Medication 20 MILLIGRAM(S): at 18:00

## 2024-06-01 RX ADMIN — Medication 3 MILLIGRAM(S): at 03:18

## 2024-06-01 RX ADMIN — TRAMADOL HYDROCHLORIDE 50 MILLIGRAM(S): 50 TABLET ORAL at 04:18

## 2024-06-01 RX ADMIN — Medication 16 UNIT(S): at 12:03

## 2024-06-01 RX ADMIN — LIDOCAINE 1 PATCH: 4 CREAM TOPICAL at 18:27

## 2024-06-01 RX ADMIN — Medication 1000 MILLIGRAM(S): at 06:17

## 2024-06-01 RX ADMIN — Medication 3: at 22:12

## 2024-06-01 RX ADMIN — Medication 1 DROP(S): at 22:15

## 2024-06-01 RX ADMIN — TRAMADOL HYDROCHLORIDE 50 MILLIGRAM(S): 50 TABLET ORAL at 20:46

## 2024-06-01 RX ADMIN — Medication 1 DROP(S): at 13:16

## 2024-06-01 RX ADMIN — Medication 2: at 16:48

## 2024-06-01 RX ADMIN — LIDOCAINE 1 PATCH: 4 CREAM TOPICAL at 02:34

## 2024-06-01 NOTE — PROGRESS NOTE ADULT - NSPROGADDITIONALINFOA_GEN_ALL_CORE

## 2024-06-01 NOTE — PROGRESS NOTE ADULT - SUBJECTIVE AND OBJECTIVE BOX
Pain improving  HMV removed  Vital Signs Last 24 Hrs  T(C): 36.8 (31 May 2024 22:13), Max: 36.9 (31 May 2024 06:00)  T(F): 98.2 (31 May 2024 22:13), Max: 98.4 (31 May 2024 06:00)  HR: 85 (31 May 2024 22:13) (76 - 85)  BP: 155/86 (31 May 2024 22:13) (132/81 - 155/86)  BP(mean): --  RR: 17 (31 May 2024 22:13) (17 - 17)  SpO2: 97% (31 May 2024 22:13) (97% - 98%)    Parameters below as of 31 May 2024 22:13  Patient On (Oxygen Delivery Method): room air    AAO X 3  PERRLA, EOMI  Face symmetric  LOJA strength 5/5  SILT    MEDICATIONS  (STANDING):  acetaminophen   Oral Liquid .. 1000 milliGRAM(s) Oral every 6 hours  artificial  tears Solution 1 Drop(s) Both EYES three times a day  bisacodyl 5 milliGRAM(s) Oral at bedtime  chlorhexidine 2% Cloths 1 Application(s) Topical daily  dextrose 10% Bolus 125 milliLiter(s) IV Bolus once  dextrose 5%. 1000 milliLiter(s) (50 mL/Hr) IV Continuous <Continuous>  dextrose 5%. 1000 milliLiter(s) (100 mL/Hr) IV Continuous <Continuous>  dextrose 50% Injectable 12.5 Gram(s) IV Push once  dextrose 50% Injectable 25 Gram(s) IV Push once  enalapril 20 milliGRAM(s) Oral two times a day  enalaprilat Injectable 2.5 milliGRAM(s) IV Push once  glucagon  Injectable 1 milliGRAM(s) IntraMuscular once  insulin glargine Injectable (LANTUS) 40 Unit(s) SubCutaneous at bedtime  insulin lispro (ADMELOG) corrective regimen sliding scale   SubCutaneous Before meals and at bedtime  insulin lispro Injectable (ADMELOG) 16 Unit(s) SubCutaneous three times a day before meals  lidocaine   4% Patch 1 Patch Transdermal every 24 hours  lidocaine   4% Patch 1 Patch Transdermal every 24 hours  metoprolol succinate ER 50 milliGRAM(s) Oral daily  polyethylene glycol 3350 17 Gram(s) Oral daily    MEDICATIONS  (PRN):  benzocaine/menthol Lozenge 1 Lozenge Oral every 2 hours PRN Sore Throat  cyclobenzaprine 5 milliGRAM(s) Oral three times a day PRN Muscle Spasm  dextrose Oral Gel 15 Gram(s) Oral once PRN Blood Glucose LESS THAN 70 milliGRAM(s)/deciliter  melatonin 3 milliGRAM(s) Oral at bedtime PRN Insomnia  senna 2 Tablet(s) Oral at bedtime PRN Constipation  traMADol 50 milliGRAM(s) Oral every 6 hours PRN Moderate Pain (4 - 6)                          14.1   9.80  )-----------( 209      ( 30 May 2024 04:00 )             39.6     05-30    135  |  101  |  24<H>  ----------------------------<  274<H>  4.7   |  20<L>  |  1.04    Ca    9.9      30 May 2024 04:00  Phos  3.3     05-30  Mg     1.80     05-30    < from: Xray Cervical Spine AP and Lateral Only (05.31.24 @ 14:13) >    IMPRESSION:  Unchanged configuration and position of previously seen C4-C7 level   anterior fusion plate with fixation screws and corticomedullary bone plug   grafts at respective disc levels.    Unchanged vertebral body and disc space heights and alignment.    Residual postsurgical prevertebral soft tissues thickening/swelling.  Posterior facet alignment maintained. Intact odontoid.    Abdominal surgical clips and discontinuous sternal wires noted in   visualized upper chest. Clear visualized lung apices. Midline normal   caliber trachea.    --- End of Report ---    < end of copied text >

## 2024-06-01 NOTE — PROGRESS NOTE ADULT - SUBJECTIVE AND OBJECTIVE BOX
Patient is a 74y old  Male who presents with a chief complaint of BL LE weakness (01 Jun 2024 02:29)      SUBJECTIVE / OVERNIGHT EVENTS:    MEDICATIONS  (STANDING):  acetaminophen   Oral Liquid .. 1000 milliGRAM(s) Oral every 6 hours  artificial  tears Solution 1 Drop(s) Both EYES three times a day  bisacodyl 5 milliGRAM(s) Oral at bedtime  chlorhexidine 2% Cloths 1 Application(s) Topical daily  dextrose 10% Bolus 125 milliLiter(s) IV Bolus once  dextrose 5%. 1000 milliLiter(s) (100 mL/Hr) IV Continuous <Continuous>  dextrose 5%. 1000 milliLiter(s) (50 mL/Hr) IV Continuous <Continuous>  dextrose 50% Injectable 25 Gram(s) IV Push once  dextrose 50% Injectable 12.5 Gram(s) IV Push once  enalapril 20 milliGRAM(s) Oral two times a day  enalaprilat Injectable 2.5 milliGRAM(s) IV Push once  glucagon  Injectable 1 milliGRAM(s) IntraMuscular once  insulin glargine Injectable (LANTUS) 40 Unit(s) SubCutaneous at bedtime  insulin lispro (ADMELOG) corrective regimen sliding scale   SubCutaneous Before meals and at bedtime  insulin lispro Injectable (ADMELOG) 16 Unit(s) SubCutaneous three times a day before meals  lidocaine   4% Patch 1 Patch Transdermal every 24 hours  lidocaine   4% Patch 1 Patch Transdermal every 24 hours  metoprolol succinate ER 50 milliGRAM(s) Oral daily  polyethylene glycol 3350 17 Gram(s) Oral daily    MEDICATIONS  (PRN):  benzocaine/menthol Lozenge 1 Lozenge Oral every 2 hours PRN Sore Throat  cyclobenzaprine 5 milliGRAM(s) Oral three times a day PRN Muscle Spasm  dextrose Oral Gel 15 Gram(s) Oral once PRN Blood Glucose LESS THAN 70 milliGRAM(s)/deciliter  melatonin 3 milliGRAM(s) Oral at bedtime PRN Insomnia  senna 2 Tablet(s) Oral at bedtime PRN Constipation  traMADol 50 milliGRAM(s) Oral every 6 hours PRN Moderate Pain (4 - 6)      Vital Signs Last 24 Hrs  T(C): 36.6 (01 Jun 2024 09:09), Max: 36.9 (01 Jun 2024 06:40)  T(F): 97.9 (01 Jun 2024 09:09), Max: 98.4 (01 Jun 2024 06:40)  HR: 87 (01 Jun 2024 09:09) (81 - 87)  BP: 116/74 (01 Jun 2024 09:09) (116/74 - 155/86)  BP(mean): --  RR: 18 (01 Jun 2024 09:09) (17 - 18)  SpO2: 97% (01 Jun 2024 09:09) (97% - 98%)    Parameters below as of 01 Jun 2024 09:09  Patient On (Oxygen Delivery Method): room air      CAPILLARY BLOOD GLUCOSE      POCT Blood Glucose.: 266 mg/dL (01 Jun 2024 11:10)  POCT Blood Glucose.: 187 mg/dL (01 Jun 2024 07:35)  POCT Blood Glucose.: 174 mg/dL (31 May 2024 22:02)  POCT Blood Glucose.: 142 mg/dL (31 May 2024 16:28)    I&O's Summary    31 May 2024 07:01  -  01 Jun 2024 07:00  --------------------------------------------------------  IN: 0 mL / OUT: 2075 mL / NET: -2075 mL        PHYSICAL EXAM:  GENERAL: NAD, well-developed  HEAD:  Atraumatic, Normocephalic  EYES: EOMI, PERRLA, conjunctiva and sclera clear  NECK: Supple, No JVD  CHEST/LUNG: Clear to auscultation bilaterally; No wheeze  HEART: Regular rate and rhythm; No murmurs, rubs, or gallops  ABDOMEN: Soft, Nontender, Nondistended; Bowel sounds present  EXTREMITIES:  2+ Peripheral Pulses, No clubbing, cyanosis, or edema  PSYCH: AAOx3  NEUROLOGY: non-focal  SKIN: No rashes or lesions    LABS:                    RADIOLOGY & ADDITIONAL TESTS:    Imaging Personally Reviewed:    Consultant(s) Notes Reviewed:      Care Discussed with Consultants/Other Providers:   Patient is a 74y old  Male who presents with a chief complaint of BL LE weakness (01 Jun 2024 02:29)      SUBJECTIVE / OVERNIGHT EVENTS: patient seen and examined by bedside, pt comfortable , no acute distress noted      MEDICATIONS  (STANDING):  acetaminophen   Oral Liquid .. 1000 milliGRAM(s) Oral every 6 hours  artificial  tears Solution 1 Drop(s) Both EYES three times a day  bisacodyl 5 milliGRAM(s) Oral at bedtime  chlorhexidine 2% Cloths 1 Application(s) Topical daily  dextrose 10% Bolus 125 milliLiter(s) IV Bolus once  dextrose 5%. 1000 milliLiter(s) (100 mL/Hr) IV Continuous <Continuous>  dextrose 5%. 1000 milliLiter(s) (50 mL/Hr) IV Continuous <Continuous>  dextrose 50% Injectable 25 Gram(s) IV Push once  dextrose 50% Injectable 12.5 Gram(s) IV Push once  enalapril 20 milliGRAM(s) Oral two times a day  enalaprilat Injectable 2.5 milliGRAM(s) IV Push once  glucagon  Injectable 1 milliGRAM(s) IntraMuscular once  insulin glargine Injectable (LANTUS) 40 Unit(s) SubCutaneous at bedtime  insulin lispro (ADMELOG) corrective regimen sliding scale   SubCutaneous Before meals and at bedtime  insulin lispro Injectable (ADMELOG) 16 Unit(s) SubCutaneous three times a day before meals  lidocaine   4% Patch 1 Patch Transdermal every 24 hours  lidocaine   4% Patch 1 Patch Transdermal every 24 hours  metoprolol succinate ER 50 milliGRAM(s) Oral daily  polyethylene glycol 3350 17 Gram(s) Oral daily    MEDICATIONS  (PRN):  benzocaine/menthol Lozenge 1 Lozenge Oral every 2 hours PRN Sore Throat  cyclobenzaprine 5 milliGRAM(s) Oral three times a day PRN Muscle Spasm  dextrose Oral Gel 15 Gram(s) Oral once PRN Blood Glucose LESS THAN 70 milliGRAM(s)/deciliter  melatonin 3 milliGRAM(s) Oral at bedtime PRN Insomnia  senna 2 Tablet(s) Oral at bedtime PRN Constipation  traMADol 50 milliGRAM(s) Oral every 6 hours PRN Moderate Pain (4 - 6)      Vital Signs Last 24 Hrs  T(C): 36.6 (01 Jun 2024 09:09), Max: 36.9 (01 Jun 2024 06:40)  T(F): 97.9 (01 Jun 2024 09:09), Max: 98.4 (01 Jun 2024 06:40)  HR: 87 (01 Jun 2024 09:09) (81 - 87)  BP: 116/74 (01 Jun 2024 09:09) (116/74 - 155/86)  BP(mean): --  RR: 18 (01 Jun 2024 09:09) (17 - 18)  SpO2: 97% (01 Jun 2024 09:09) (97% - 98%)    Parameters below as of 01 Jun 2024 09:09  Patient On (Oxygen Delivery Method): room air      CAPILLARY BLOOD GLUCOSE      POCT Blood Glucose.: 266 mg/dL (01 Jun 2024 11:10)  POCT Blood Glucose.: 187 mg/dL (01 Jun 2024 07:35)  POCT Blood Glucose.: 174 mg/dL (31 May 2024 22:02)  POCT Blood Glucose.: 142 mg/dL (31 May 2024 16:28)    I&O's Summary    31 May 2024 07:01  -  01 Jun 2024 07:00  --------------------------------------------------------  IN: 0 mL / OUT: 2075 mL / NET: -2075 mL        PHYSICAL EXAM:  GENERAL: NAD  NECK: dressing c.d.i   CHEST/LUNG: Clear to auscultation bilaterally  HEART: Regular rate and rhythm; No murmurs, rubs, or gallops, No Edema  ABDOMEN: Soft, Nontender, Nondistended; Bowel sounds present  EXTREMITIES:  2+ Peripheral Pulses, No clubbing, cyanosis          LABS:            no new labs         RADIOLOGY & ADDITIONAL TESTS:     Imaging Personally Reviewed:    Consultant(s) Notes Reviewed:  cardiology, Neurosx     Care Discussed with Consultants/Other Providers:

## 2024-06-01 NOTE — PROGRESS NOTE ADULT - ASSESSMENT
72yo M PMHx spinal stenosis complicated by ??neurogenic bladder?? diagnosed 3 years ago [place], pt says he had bladder reflux with chronic hernandez, had imaging at that time and refused surgical intervention. He has never followed in [facility] w spine surgeon. He presented with progressive weakness and pain in lower extremities since 5/15. CT cervical spine reveals high-grade bilateral C2-C3, C3-C4, C5-C6, and C6-C7 stenosis.    5/29: OR for C4-C7 ACDF  5/30: s/p C4-7 ACDF, hmv drain, pod #1, possible nonluminal esophageal injury closed with stitch, npo, ENT following, cxray done to r/o pneumomediastinum  5/31: Stable exam POD # 2 S/P  C4-7 ACDF, HMV X 1. CXR showed no pneumomediastinum. Initial evaluation done by PM&R  6/1: Stable exam. HMV removed. C-spine X-ray stable. Final recommendations from PM&R pending

## 2024-06-01 NOTE — PROGRESS NOTE ADULT - SUBJECTIVE AND OBJECTIVE BOX
True Rothman MD  Interventional Cardiology / Advance Heart Failure and Cardiac Transplant Specialist  [place] Office : [address] [place] [address] [place] [address]  Tel:   [place] Office : [address] [facility] [place] [address]  Tel: [phone]       Pt is lying in bed comfortable not in distress, no chest pains no SOB no palpitations, s/p cervical spine surgery doing well    MEDICATIONS:  enalapril 20 milliGRAM(s) Oral two times a day  enalaprilat Injectable 2.5 milliGRAM(s) IV Push once  enoxaparin Injectable 40 milliGRAM(s) SubCutaneous every 24 hours  metoprolol succinate ER 50 milliGRAM(s) Oral daily        acetaminophen     Tablet .. 650 milliGRAM(s) Oral every 6 hours PRN  cyclobenzaprine 5 milliGRAM(s) Oral three times a day PRN  melatonin 3 milliGRAM(s) Oral at bedtime PRN  traMADol 50 milliGRAM(s) Oral every 6 hours PRN    bisacodyl 5 milliGRAM(s) Oral at bedtime  pantoprazole    Tablet 40 milliGRAM(s) Oral before breakfast  polyethylene glycol 3350 17 Gram(s) Oral daily  senna 2 Tablet(s) Oral at bedtime PRN    dextrose 50% Injectable 25 Gram(s) IV Push once  dextrose 50% Injectable 12.5 Gram(s) IV Push once  dextrose Oral Gel 15 Gram(s) Oral once PRN  glucagon  Injectable 1 milliGRAM(s) IntraMuscular once  insulin glargine Injectable (LANTUS) 40 Unit(s) SubCutaneous at bedtime  insulin lispro (ADMELOG) corrective regimen sliding scale   SubCutaneous Before meals and at bedtime  insulin lispro Injectable (ADMELOG) 16 Unit(s) SubCutaneous three times a day before meals    artificial  tears Solution 1 Drop(s) Both EYES three times a day  benzocaine/menthol Lozenge 1 Lozenge Oral every 2 hours PRN  chlorhexidine 2% Cloths 1 Application(s) Topical daily  dextrose 10% Bolus 125 milliLiter(s) IV Bolus once  dextrose 5%. 1000 milliLiter(s) IV Continuous <Continuous>  dextrose 5%. 1000 milliLiter(s) IV Continuous <Continuous>  lidocaine   4% Patch 1 Patch Transdermal every 24 hours  lidocaine   4% Patch 1 Patch Transdermal every 24 hours      PAST MEDICAL/SURGICAL HISTORY  PAST MEDICAL & SURGICAL HISTORY:  Diabetes      Neurogenic bladder      Chronic indwelling Salazar catheter      Hypertension      CAD (coronary artery disease)      History of appendectomy      S/P CABG (coronary artery bypass graft)          SOCIAL HISTORY: Substance Use (street drugs): ( x ) never used  (  ) other:    FAMILY HISTORY:  Family hx of lung cancer (Father)      PHYSICAL EXAM:  T(C): 36.6 (06-01-24 @ 09:09), Max: 36.9 (06-01-24 @ 06:40)  HR: 88 (06-01-24 @ 12:08) (81 - 88)  BP: 115/73 (06-01-24 @ 12:08) (115/73 - 155/86)  RR: 18 (06-01-24 @ 12:08) (17 - 18)  SpO2: 98% (06-01-24 @ 12:08) (97% - 98%)  Wt(kg): --  I&O's Summary    31 May 2024 07:01  -  01 Jun 2024 07:00  --------------------------------------------------------  IN: 0 mL / OUT: 2075 mL / NET: -2075 mL        GENERAL: NAD  EYES:   PERRLA   ENMT:   Moist mucous membranes, Good dentition, No lesions  Cardiovascular: Normal S1 S2, No JVD, No murmurs, No edema  Respiratory: Lungs clear to auscultation	  Gastrointestinal:  Soft, Non-tender, + BS	  Extremities: no edema          proBNP:   Lipid Profile:   HgA1c:   TSH:     Consultant(s) Notes Reviewed:  [x ] YES  [ ] NO    Care Discussed with Consultants/Other Providers [ x] YES  [ ] NO    Imaging Personally Reviewed independently:  [x] YES  [ ] NO    All labs, radiologic studies, vitals, orders and medications list reviewed. Patient is seen and examined at bedside. Case discussed with medical team.

## 2024-06-02 LAB
GLUCOSE BLDC GLUCOMTR-MCNC: 116 MG/DL — HIGH (ref 70–99)
GLUCOSE BLDC GLUCOMTR-MCNC: 145 MG/DL — HIGH (ref 70–99)
GLUCOSE BLDC GLUCOMTR-MCNC: 162 MG/DL — HIGH (ref 70–99)
GLUCOSE BLDC GLUCOMTR-MCNC: 239 MG/DL — HIGH (ref 70–99)
GLUCOSE BLDC GLUCOMTR-MCNC: 90 MG/DL — SIGNIFICANT CHANGE UP (ref 70–99)

## 2024-06-02 PROCEDURE — 99232 SBSQ HOSP IP/OBS MODERATE 35: CPT

## 2024-06-02 RX ORDER — LIDOCAINE 4 G/100G
1 CREAM TOPICAL ONCE
Refills: 0 | Status: COMPLETED | OUTPATIENT
Start: 2024-06-02 | End: 2024-06-02

## 2024-06-02 RX ORDER — ACETAMINOPHEN 500 MG
650 TABLET ORAL EVERY 6 HOURS
Refills: 0 | Status: DISCONTINUED | OUTPATIENT
Start: 2024-06-02 | End: 2024-06-04

## 2024-06-02 RX ORDER — LIDOCAINE 4 G/100G
1 CREAM TOPICAL
Refills: 0 | Status: DISCONTINUED | OUTPATIENT
Start: 2024-06-02 | End: 2024-06-03

## 2024-06-02 RX ADMIN — TRAMADOL HYDROCHLORIDE 50 MILLIGRAM(S): 50 TABLET ORAL at 22:31

## 2024-06-02 RX ADMIN — Medication 1 DROP(S): at 22:34

## 2024-06-02 RX ADMIN — LIDOCAINE 1 PATCH: 4 CREAM TOPICAL at 19:07

## 2024-06-02 RX ADMIN — Medication 1 DROP(S): at 06:53

## 2024-06-02 RX ADMIN — LIDOCAINE 1 PATCH: 4 CREAM TOPICAL at 01:00

## 2024-06-02 RX ADMIN — Medication 650 MILLIGRAM(S): at 14:55

## 2024-06-02 RX ADMIN — Medication 50 MILLIGRAM(S): at 06:53

## 2024-06-02 RX ADMIN — TRAMADOL HYDROCHLORIDE 50 MILLIGRAM(S): 50 TABLET ORAL at 05:51

## 2024-06-02 RX ADMIN — ENOXAPARIN SODIUM 40 MILLIGRAM(S): 100 INJECTION SUBCUTANEOUS at 01:24

## 2024-06-02 RX ADMIN — Medication 650 MILLIGRAM(S): at 02:58

## 2024-06-02 RX ADMIN — Medication 16 UNIT(S): at 16:54

## 2024-06-02 RX ADMIN — PANTOPRAZOLE SODIUM 40 MILLIGRAM(S): 20 TABLET, DELAYED RELEASE ORAL at 08:48

## 2024-06-02 RX ADMIN — Medication 3 MILLIGRAM(S): at 22:40

## 2024-06-02 RX ADMIN — Medication 16 UNIT(S): at 06:59

## 2024-06-02 RX ADMIN — LIDOCAINE 1 PATCH: 4 CREAM TOPICAL at 07:06

## 2024-06-02 RX ADMIN — TRAMADOL HYDROCHLORIDE 50 MILLIGRAM(S): 50 TABLET ORAL at 23:31

## 2024-06-02 RX ADMIN — TRAMADOL HYDROCHLORIDE 50 MILLIGRAM(S): 50 TABLET ORAL at 15:05

## 2024-06-02 RX ADMIN — CHLORHEXIDINE GLUCONATE 1 APPLICATION(S): 213 SOLUTION TOPICAL at 15:05

## 2024-06-02 RX ADMIN — Medication 1 DROP(S): at 14:35

## 2024-06-02 RX ADMIN — INSULIN GLARGINE 40 UNIT(S): 100 INJECTION, SOLUTION SUBCUTANEOUS at 22:45

## 2024-06-02 RX ADMIN — TRAMADOL HYDROCHLORIDE 50 MILLIGRAM(S): 50 TABLET ORAL at 16:05

## 2024-06-02 RX ADMIN — LIDOCAINE 1 PATCH: 4 CREAM TOPICAL at 10:19

## 2024-06-02 RX ADMIN — Medication 1: at 06:58

## 2024-06-02 RX ADMIN — Medication 20 MILLIGRAM(S): at 06:53

## 2024-06-02 RX ADMIN — TRAMADOL HYDROCHLORIDE 50 MILLIGRAM(S): 50 TABLET ORAL at 04:51

## 2024-06-02 RX ADMIN — LIDOCAINE 1 PATCH: 4 CREAM TOPICAL at 23:18

## 2024-06-02 RX ADMIN — Medication 16 UNIT(S): at 11:49

## 2024-06-02 RX ADMIN — LIDOCAINE 1 PATCH: 4 CREAM TOPICAL at 10:00

## 2024-06-02 RX ADMIN — Medication 650 MILLIGRAM(S): at 01:58

## 2024-06-02 RX ADMIN — Medication 650 MILLIGRAM(S): at 13:51

## 2024-06-02 NOTE — PROGRESS NOTE ADULT - ASSESSMENT
74yo M PMHx spinal stenosis complicated by ??neurogenic bladder?? [place] ago [place], pt says he had bladder reflux with chronic hernandez, had imaging at that time and refused surgical intervention. He has never followed in [facility] w spine surgeon. He presented with progressive weakness and pain in lower extremities since 5/15. CT cervical spine reveals high-grade bilateral C2-C3, C3-C4, C5-C6, and C6-C7 stenosis.    5/29: OR for C4-C7 ACDF  5/30: s/p C4-7 ACDF, hmv drain, pod #1, possible nonluminal esophageal injury closed with stitch, npo, ENT following, cxray done to r/o pneumomediastinum  5/31: Stable exam POD # 2 S/P  C4-7 ACDF, HMV X 1. CXR showed no pneumomediastinum. Initial evaluation done by PM&R  6/1-2: Stable exam. HMV removed. C-spine X-ray stable. Final recommendations from PM&R pending

## 2024-06-02 NOTE — PROGRESS NOTE ADULT - PROBLEM SELECTOR PLAN 4
-Continue enalapril 20 BID  - continue metop 25 daily -Continue enalapril 20 BID  - continue metop 50 daily

## 2024-06-02 NOTE — PROGRESS NOTE ADULT - SUBJECTIVE AND OBJECTIVE BOX
No issues overnight  Vital Signs Last 24 Hrs  T(C): 36.9 (01 Jun 2024 21:53), Max: 36.9 (01 Jun 2024 06:40)  T(F): 98.5 (01 Jun 2024 21:53), Max: 98.5 (01 Jun 2024 21:53)  HR: 77 (01 Jun 2024 21:53) (77 - 89)  BP: 141/84 (01 Jun 2024 21:53) (115/73 - 157/83)  BP(mean): --  RR: 17 (01 Jun 2024 21:53) (17 - 18)  SpO2: 99% (01 Jun 2024 21:53) (97% - 99%)    Parameters below as of 01 Jun 2024 21:53  Patient On (Oxygen Delivery Method): room air    AAO X 3  PERRLA, EOMI  Face symmetric  LOJA strength 5/5  SILT    MEDICATIONS  (STANDING):  artificial  tears Solution 1 Drop(s) Both EYES three times a day  bisacodyl 5 milliGRAM(s) Oral at bedtime  chlorhexidine 2% Cloths 1 Application(s) Topical daily  dextrose 10% Bolus 125 milliLiter(s) IV Bolus once  dextrose 5%. 1000 milliLiter(s) (100 mL/Hr) IV Continuous <Continuous>  dextrose 5%. 1000 milliLiter(s) (50 mL/Hr) IV Continuous <Continuous>  dextrose 50% Injectable 25 Gram(s) IV Push once  dextrose 50% Injectable 12.5 Gram(s) IV Push once  enalapril 20 milliGRAM(s) Oral two times a day  enalaprilat Injectable 2.5 milliGRAM(s) IV Push once  enoxaparin Injectable 40 milliGRAM(s) SubCutaneous every 24 hours  glucagon  Injectable 1 milliGRAM(s) IntraMuscular once  insulin glargine Injectable (LANTUS) 40 Unit(s) SubCutaneous at bedtime  insulin lispro (ADMELOG) corrective regimen sliding scale   SubCutaneous Before meals and at bedtime  insulin lispro Injectable (ADMELOG) 16 Unit(s) SubCutaneous three times a day before meals  lidocaine   4% Patch 1 Patch Transdermal every 24 hours  metoprolol succinate ER 50 milliGRAM(s) Oral daily  pantoprazole    Tablet 40 milliGRAM(s) Oral before breakfast  polyethylene glycol 3350 17 Gram(s) Oral daily    MEDICATIONS  (PRN):  acetaminophen   Oral Liquid .. 650 milliGRAM(s) Oral every 6 hours PRN Temp greater or equal to 38C (100.4F), Mild Pain (1 - 3)  benzocaine/menthol Lozenge 1 Lozenge Oral every 2 hours PRN Sore Throat  cyclobenzaprine 5 milliGRAM(s) Oral three times a day PRN Muscle Spasm  dextrose Oral Gel 15 Gram(s) Oral once PRN Blood Glucose LESS THAN 70 milliGRAM(s)/deciliter  lidocaine 2% Gel 1 Application(s) Topical two times a day PRN pain  melatonin 3 milliGRAM(s) Oral at bedtime PRN Insomnia  senna 2 Tablet(s) Oral at bedtime PRN Constipation  traMADol 50 milliGRAM(s) Oral every 6 hours PRN Moderate Pain (4 - 6)

## 2024-06-02 NOTE — PROGRESS NOTE ADULT - SUBJECTIVE AND OBJECTIVE BOX
Patient is a 74y old  Male who presents with a chief complaint of BL LE weakness (02 Jun 2024 01:33)      SUBJECTIVE / OVERNIGHT EVENTS:    MEDICATIONS  (STANDING):  artificial  tears Solution 1 Drop(s) Both EYES three times a day  bisacodyl 5 milliGRAM(s) Oral at bedtime  chlorhexidine 2% Cloths 1 Application(s) Topical daily  dextrose 10% Bolus 125 milliLiter(s) IV Bolus once  dextrose 5%. 1000 milliLiter(s) (100 mL/Hr) IV Continuous <Continuous>  dextrose 5%. 1000 milliLiter(s) (50 mL/Hr) IV Continuous <Continuous>  dextrose 50% Injectable 25 Gram(s) IV Push once  dextrose 50% Injectable 12.5 Gram(s) IV Push once  enalapril 20 milliGRAM(s) Oral two times a day  enalaprilat Injectable 2.5 milliGRAM(s) IV Push once  enoxaparin Injectable 40 milliGRAM(s) SubCutaneous every 24 hours  glucagon  Injectable 1 milliGRAM(s) IntraMuscular once  insulin glargine Injectable (LANTUS) 40 Unit(s) SubCutaneous at bedtime  insulin lispro (ADMELOG) corrective regimen sliding scale   SubCutaneous Before meals and at bedtime  insulin lispro Injectable (ADMELOG) 16 Unit(s) SubCutaneous three times a day before meals  lidocaine   4% Patch 1 Patch Transdermal every 24 hours  metoprolol succinate ER 50 milliGRAM(s) Oral daily  pantoprazole    Tablet 40 milliGRAM(s) Oral before breakfast  polyethylene glycol 3350 17 Gram(s) Oral daily    MEDICATIONS  (PRN):  acetaminophen   Oral Liquid .. 650 milliGRAM(s) Oral every 6 hours PRN Temp greater or equal to 38C (100.4F), Mild Pain (1 - 3)  benzocaine/menthol Lozenge 1 Lozenge Oral every 2 hours PRN Sore Throat  cyclobenzaprine 5 milliGRAM(s) Oral three times a day PRN Muscle Spasm  dextrose Oral Gel 15 Gram(s) Oral once PRN Blood Glucose LESS THAN 70 milliGRAM(s)/deciliter  lidocaine 2% Gel 1 Application(s) Topical two times a day PRN pain  melatonin 3 milliGRAM(s) Oral at bedtime PRN Insomnia  senna 2 Tablet(s) Oral at bedtime PRN Constipation  traMADol 50 milliGRAM(s) Oral every 6 hours PRN Moderate Pain (4 - 6)      Vital Signs Last 24 Hrs  T(C): 36.6 (02 Jun 2024 10:24), Max: 37 (02 Jun 2024 06:50)  T(F): 97.9 (02 Jun 2024 10:24), Max: 98.6 (02 Jun 2024 06:50)  HR: 76 (02 Jun 2024 10:24) (76 - 89)  BP: 132/76 (02 Jun 2024 10:24) (115/73 - 157/83)  BP(mean): --  RR: 17 (02 Jun 2024 10:24) (17 - 18)  SpO2: 97% (02 Jun 2024 10:24) (97% - 99%)    Parameters below as of 02 Jun 2024 10:24  Patient On (Oxygen Delivery Method): room air      CAPILLARY BLOOD GLUCOSE      POCT Blood Glucose.: 162 mg/dL (02 Jun 2024 06:57)  POCT Blood Glucose.: 254 mg/dL (01 Jun 2024 22:06)  POCT Blood Glucose.: 208 mg/dL (01 Jun 2024 16:30)  POCT Blood Glucose.: 266 mg/dL (01 Jun 2024 11:10)    I&O's Summary    01 Jun 2024 07:01  -  02 Jun 2024 07:00  --------------------------------------------------------  IN: 0 mL / OUT: 2225 mL / NET: -2225 mL    02 Jun 2024 07:01  -  02 Jun 2024 10:56  --------------------------------------------------------  IN: 0 mL / OUT: 1250 mL / NET: -1250 mL        PHYSICAL EXAM:  GENERAL: NAD, well-developed  HEAD:  Atraumatic, Normocephalic  EYES: EOMI, PERRLA, conjunctiva and sclera clear  NECK: Supple, No JVD  CHEST/LUNG: Clear to auscultation bilaterally; No wheeze  HEART: Regular rate and rhythm; No murmurs, rubs, or gallops  ABDOMEN: Soft, Nontender, Nondistended; Bowel sounds present  EXTREMITIES:  2+ Peripheral Pulses, No clubbing, cyanosis, or edema  PSYCH: AAOx3  NEUROLOGY: non-focal  SKIN: No rashes or lesions    LABS:                    RADIOLOGY & ADDITIONAL TESTS:    Imaging Personally Reviewed:    Consultant(s) Notes Reviewed:      Care Discussed with Consultants/Other Providers:   Patient is a 74y old  Male who presents with a chief complaint of BL LE weakness (02 Jun 2024 01:33)      SUBJECTIVE / OVERNIGHT EVENTS: patient seen and examined by bedside, pt alert and awake, no acute distress noted      MEDICATIONS  (STANDING):  artificial  tears Solution 1 Drop(s) Both EYES three times a day  bisacodyl 5 milliGRAM(s) Oral at bedtime  chlorhexidine 2% Cloths 1 Application(s) Topical daily  dextrose 10% Bolus 125 milliLiter(s) IV Bolus once  dextrose 5%. 1000 milliLiter(s) (100 mL/Hr) IV Continuous <Continuous>  dextrose 5%. 1000 milliLiter(s) (50 mL/Hr) IV Continuous <Continuous>  dextrose 50% Injectable 25 Gram(s) IV Push once  dextrose 50% Injectable 12.5 Gram(s) IV Push once  enalapril 20 milliGRAM(s) Oral two times a day  enalaprilat Injectable 2.5 milliGRAM(s) IV Push once  enoxaparin Injectable 40 milliGRAM(s) SubCutaneous every 24 hours  glucagon  Injectable 1 milliGRAM(s) IntraMuscular once  insulin glargine Injectable (LANTUS) 40 Unit(s) SubCutaneous at bedtime  insulin lispro (ADMELOG) corrective regimen sliding scale   SubCutaneous Before meals and at bedtime  insulin lispro Injectable (ADMELOG) 16 Unit(s) SubCutaneous three times a day before meals  lidocaine   4% Patch 1 Patch Transdermal every 24 hours  metoprolol succinate ER 50 milliGRAM(s) Oral daily  pantoprazole    Tablet 40 milliGRAM(s) Oral before breakfast  polyethylene glycol 3350 17 Gram(s) Oral daily    MEDICATIONS  (PRN):  acetaminophen   Oral Liquid .. 650 milliGRAM(s) Oral every 6 hours PRN Temp greater or equal to 38C (100.4F), Mild Pain (1 - 3)  benzocaine/menthol Lozenge 1 Lozenge Oral every 2 hours PRN Sore Throat  cyclobenzaprine 5 milliGRAM(s) Oral three times a day PRN Muscle Spasm  dextrose Oral Gel 15 Gram(s) Oral once PRN Blood Glucose LESS THAN 70 milliGRAM(s)/deciliter  lidocaine 2% Gel 1 Application(s) Topical two times a day PRN pain  melatonin 3 milliGRAM(s) Oral at bedtime PRN Insomnia  senna 2 Tablet(s) Oral at bedtime PRN Constipation  traMADol 50 milliGRAM(s) Oral every 6 hours PRN Moderate Pain (4 - 6)      Vital Signs Last 24 Hrs  T(C): 36.6 (02 Jun 2024 10:24), Max: 37 (02 Jun 2024 06:50)  T(F): 97.9 (02 Jun 2024 10:24), Max: 98.6 (02 Jun 2024 06:50)  HR: 76 (02 Jun 2024 10:24) (76 - 89)  BP: 132/76 (02 Jun 2024 10:24) (115/73 - 157/83)  BP(mean): --  RR: 17 (02 Jun 2024 10:24) (17 - 18)  SpO2: 97% (02 Jun 2024 10:24) (97% - 99%)    Parameters below as of 02 Jun 2024 10:24  Patient On (Oxygen Delivery Method): room air      CAPILLARY BLOOD GLUCOSE      POCT Blood Glucose.: 162 mg/dL (02 Jun 2024 06:57)  POCT Blood Glucose.: 254 mg/dL (01 Jun 2024 22:06)  POCT Blood Glucose.: 208 mg/dL (01 Jun 2024 16:30)  POCT Blood Glucose.: 266 mg/dL (01 Jun 2024 11:10)    I&O's Summary    01 Jun 2024 07:01  -  02 Jun 2024 07:00  --------------------------------------------------------  IN: 0 mL / OUT: 2225 mL / NET: -2225 mL    02 Jun 2024 07:01  -  02 Jun 2024 10:56  --------------------------------------------------------  IN: 0 mL / OUT: 1250 mL / NET: -1250 mL      PHYSICAL EXAM:  GENERAL: NAD  NECK: dressing c.d.i   CHEST/LUNG: Clear to auscultation bilaterally  HEART: Regular rate and rhythm; No murmurs, rubs, or gallops, No Edema  ABDOMEN: Soft, Nontender, Nondistended; Bowel sounds present  EXTREMITIES:  2+ Peripheral Pulses, No clubbing, cyanosis        LABS:      no new labs               RADIOLOGY & ADDITIONAL TESTS:    Imaging Personally Reviewed:    Consultant(s) Notes Reviewed:      Care Discussed with Consultants/Other Providers:

## 2024-06-03 LAB
ANION GAP SERPL CALC-SCNC: 12 MMOL/L — SIGNIFICANT CHANGE UP (ref 7–14)
BUN SERPL-MCNC: 27 MG/DL — HIGH (ref 7–23)
CALCIUM SERPL-MCNC: 10.2 MG/DL — SIGNIFICANT CHANGE UP (ref 8.4–10.5)
CHLORIDE SERPL-SCNC: 99 MMOL/L — SIGNIFICANT CHANGE UP (ref 98–107)
CO2 SERPL-SCNC: 24 MMOL/L — SIGNIFICANT CHANGE UP (ref 22–31)
CREAT SERPL-MCNC: 1.04 MG/DL — SIGNIFICANT CHANGE UP (ref 0.5–1.3)
EGFR: 76 ML/MIN/1.73M2 — SIGNIFICANT CHANGE UP
GLUCOSE BLDC GLUCOMTR-MCNC: 121 MG/DL — HIGH (ref 70–99)
GLUCOSE BLDC GLUCOMTR-MCNC: 156 MG/DL — HIGH (ref 70–99)
GLUCOSE BLDC GLUCOMTR-MCNC: 157 MG/DL — HIGH (ref 70–99)
GLUCOSE BLDC GLUCOMTR-MCNC: 186 MG/DL — HIGH (ref 70–99)
GLUCOSE SERPL-MCNC: 151 MG/DL — HIGH (ref 70–99)
HCT VFR BLD CALC: 41.4 % — SIGNIFICANT CHANGE UP (ref 39–50)
HGB BLD-MCNC: 14.3 G/DL — SIGNIFICANT CHANGE UP (ref 13–17)
MCHC RBC-ENTMCNC: 30.6 PG — SIGNIFICANT CHANGE UP (ref 27–34)
MCHC RBC-ENTMCNC: 34.5 GM/DL — SIGNIFICANT CHANGE UP (ref 32–36)
MCV RBC AUTO: 88.7 FL — SIGNIFICANT CHANGE UP (ref 80–100)
NRBC # BLD: 0 /100 WBCS — SIGNIFICANT CHANGE UP (ref 0–0)
NRBC # FLD: 0 K/UL — SIGNIFICANT CHANGE UP (ref 0–0)
PLATELET # BLD AUTO: 268 K/UL — SIGNIFICANT CHANGE UP (ref 150–400)
POTASSIUM SERPL-MCNC: 4.2 MMOL/L — SIGNIFICANT CHANGE UP (ref 3.5–5.3)
POTASSIUM SERPL-SCNC: 4.2 MMOL/L — SIGNIFICANT CHANGE UP (ref 3.5–5.3)
RBC # BLD: 4.67 M/UL — SIGNIFICANT CHANGE UP (ref 4.2–5.8)
RBC # FLD: 12.2 % — SIGNIFICANT CHANGE UP (ref 10.3–14.5)
SODIUM SERPL-SCNC: 135 MMOL/L — SIGNIFICANT CHANGE UP (ref 135–145)
WBC # BLD: 8.47 K/UL — SIGNIFICANT CHANGE UP (ref 3.8–10.5)
WBC # FLD AUTO: 8.47 K/UL — SIGNIFICANT CHANGE UP (ref 3.8–10.5)

## 2024-06-03 PROCEDURE — 99232 SBSQ HOSP IP/OBS MODERATE 35: CPT

## 2024-06-03 RX ORDER — LIDOCAINE 4 G/100G
1 CREAM TOPICAL
Qty: 0 | Refills: 0 | DISCHARGE
Start: 2024-06-03

## 2024-06-03 RX ORDER — PANTOPRAZOLE SODIUM 20 MG/1
1 TABLET, DELAYED RELEASE ORAL
Qty: 0 | Refills: 0 | DISCHARGE
Start: 2024-06-03

## 2024-06-03 RX ORDER — CYCLOBENZAPRINE HYDROCHLORIDE 10 MG/1
1 TABLET, FILM COATED ORAL
Qty: 0 | Refills: 0 | DISCHARGE
Start: 2024-06-03

## 2024-06-03 RX ORDER — ENOXAPARIN SODIUM 100 MG/ML
40 INJECTION SUBCUTANEOUS
Qty: 0 | Refills: 0 | DISCHARGE
Start: 2024-06-03

## 2024-06-03 RX ORDER — METOPROLOL TARTRATE 50 MG
1 TABLET ORAL
Qty: 0 | Refills: 0 | DISCHARGE
Start: 2024-06-03

## 2024-06-03 RX ORDER — TRAMADOL HYDROCHLORIDE 50 MG/1
1 TABLET ORAL
Qty: 0 | Refills: 0 | DISCHARGE
Start: 2024-06-03

## 2024-06-03 RX ORDER — CYCLOBENZAPRINE HYDROCHLORIDE 10 MG/1
1 TABLET, FILM COATED ORAL
Qty: 42 | Refills: 0
Start: 2024-06-03 | End: 2024-06-16

## 2024-06-03 RX ORDER — LIDOCAINE 4 G/100G
1 CREAM TOPICAL
Qty: 4 | Refills: 0
Start: 2024-06-03 | End: 2024-06-06

## 2024-06-03 RX ORDER — POLYETHYLENE GLYCOL 3350 17 G/17G
17 POWDER, FOR SOLUTION ORAL
Qty: 0 | Refills: 0 | DISCHARGE
Start: 2024-06-03

## 2024-06-03 RX ORDER — TIZANIDINE 4 MG/1
1 TABLET ORAL
Qty: 21 | Refills: 0
Start: 2024-06-03 | End: 2024-06-09

## 2024-06-03 RX ORDER — SENNA PLUS 8.6 MG/1
2 TABLET ORAL
Qty: 0 | Refills: 0 | DISCHARGE
Start: 2024-06-03

## 2024-06-03 RX ORDER — METOPROLOL TARTRATE 50 MG
1 TABLET ORAL
Qty: 30 | Refills: 0
Start: 2024-06-03 | End: 2024-07-02

## 2024-06-03 RX ORDER — PANTOPRAZOLE SODIUM 20 MG/1
1 TABLET, DELAYED RELEASE ORAL
Qty: 30 | Refills: 0
Start: 2024-06-03 | End: 2024-07-02

## 2024-06-03 RX ORDER — PHENAZOPYRIDINE HCL 100 MG
100 TABLET ORAL EVERY 8 HOURS
Refills: 0 | Status: DISCONTINUED | OUTPATIENT
Start: 2024-06-03 | End: 2024-06-03

## 2024-06-03 RX ORDER — TRAMADOL HYDROCHLORIDE 50 MG/1
1 TABLET ORAL
Qty: 20 | Refills: 0
Start: 2024-06-03 | End: 2024-06-07

## 2024-06-03 RX ORDER — ASPIRIN/CALCIUM CARB/MAGNESIUM 324 MG
0 TABLET ORAL
Refills: 0 | DISCHARGE

## 2024-06-03 RX ORDER — PHENAZOPYRIDINE HCL 100 MG
100 TABLET ORAL EVERY 8 HOURS
Refills: 0 | Status: COMPLETED | OUTPATIENT
Start: 2024-06-03 | End: 2024-06-04

## 2024-06-03 RX ADMIN — Medication 1: at 11:48

## 2024-06-03 RX ADMIN — LIDOCAINE 1 PATCH: 4 CREAM TOPICAL at 06:18

## 2024-06-03 RX ADMIN — Medication 1 DROP(S): at 06:00

## 2024-06-03 RX ADMIN — Medication 650 MILLIGRAM(S): at 14:30

## 2024-06-03 RX ADMIN — Medication 650 MILLIGRAM(S): at 03:12

## 2024-06-03 RX ADMIN — Medication 16 UNIT(S): at 11:48

## 2024-06-03 RX ADMIN — TRAMADOL HYDROCHLORIDE 50 MILLIGRAM(S): 50 TABLET ORAL at 06:58

## 2024-06-03 RX ADMIN — TRAMADOL HYDROCHLORIDE 50 MILLIGRAM(S): 50 TABLET ORAL at 23:43

## 2024-06-03 RX ADMIN — Medication 16 UNIT(S): at 07:45

## 2024-06-03 RX ADMIN — Medication 1: at 07:44

## 2024-06-03 RX ADMIN — Medication 1: at 22:17

## 2024-06-03 RX ADMIN — Medication 650 MILLIGRAM(S): at 22:20

## 2024-06-03 RX ADMIN — Medication 100 MILLIGRAM(S): at 21:26

## 2024-06-03 RX ADMIN — TRAMADOL HYDROCHLORIDE 50 MILLIGRAM(S): 50 TABLET ORAL at 05:58

## 2024-06-03 RX ADMIN — Medication 3 MILLIGRAM(S): at 21:28

## 2024-06-03 RX ADMIN — LIDOCAINE 1 PATCH: 4 CREAM TOPICAL at 14:33

## 2024-06-03 RX ADMIN — Medication 650 MILLIGRAM(S): at 15:30

## 2024-06-03 RX ADMIN — Medication 650 MILLIGRAM(S): at 22:13

## 2024-06-03 RX ADMIN — ENOXAPARIN SODIUM 40 MILLIGRAM(S): 100 INJECTION SUBCUTANEOUS at 01:09

## 2024-06-03 RX ADMIN — INSULIN GLARGINE 40 UNIT(S): 100 INJECTION, SOLUTION SUBCUTANEOUS at 22:17

## 2024-06-03 RX ADMIN — Medication 650 MILLIGRAM(S): at 02:12

## 2024-06-03 RX ADMIN — LIDOCAINE 1 PATCH: 4 CREAM TOPICAL at 07:04

## 2024-06-03 RX ADMIN — Medication 1 DROP(S): at 14:33

## 2024-06-03 RX ADMIN — LIDOCAINE 1 PATCH: 4 CREAM TOPICAL at 19:49

## 2024-06-03 RX ADMIN — Medication 20 MILLIGRAM(S): at 19:58

## 2024-06-03 RX ADMIN — Medication 50 MILLIGRAM(S): at 05:58

## 2024-06-03 RX ADMIN — PANTOPRAZOLE SODIUM 40 MILLIGRAM(S): 20 TABLET, DELAYED RELEASE ORAL at 05:59

## 2024-06-03 RX ADMIN — Medication 16 UNIT(S): at 16:49

## 2024-06-03 NOTE — PROGRESS NOTE ADULT - ASSESSMENT
74yo M PMHx spinal stenosis complicated by ??neurogenic bladder?? diagnosed [place] ago [place], pt says he had bladder reflux with chronic hernandez, had imaging at that time and refused surgical intervention. He has never followed in [facility] w spine surgeon. He presented with progressive weakness and pain in lower extremities since 5/15. CT cervical spine reveals high-grade bilateral C2-C3, C3-C4, C5-C6, and C6-C7 stenosis.    5/29: OR for C4-C7 ACDF  5/30: s/p C4-7 ACDF, hmv drain, pod #1, possible nonluminal esophageal injury closed with stitch, npo, ENT following, cxray done to r/o pneumomediastinum  5/31: Stable exam POD # 2 S/P  C4-7 ACDF, HMV X 1. CXR showed no pneumomediastinum. Initial evaluation done by PM&R  6/1-3: Stable exam. HMV removed. C-spine X-ray stable. awaiting acute rehab

## 2024-06-03 NOTE — PROGRESS NOTE ADULT - SUBJECTIVE AND OBJECTIVE BOX
True Rothman MD  Interventional Cardiology / Endovascular Specialist  [place] Office : [phone] [place] [address] [place] [address]  Tel:   [place] Office : [phone] [facility] [place] [address]  Tel: [phone]    Pt is lying in bed comfortable not in distress, no chest pains no SOB no palpitations, s/p cervical spine surgery doing well  	  MEDICATIONS:  enalapril 20 milliGRAM(s) Oral two times a day  enalaprilat Injectable 2.5 milliGRAM(s) IV Push once  enoxaparin Injectable 40 milliGRAM(s) SubCutaneous every 24 hours  metoprolol succinate ER 50 milliGRAM(s) Oral daily        acetaminophen   Oral Liquid .. 650 milliGRAM(s) Oral every 6 hours PRN  cyclobenzaprine 5 milliGRAM(s) Oral three times a day PRN  melatonin 3 milliGRAM(s) Oral at bedtime PRN  traMADol 50 milliGRAM(s) Oral every 6 hours PRN    bisacodyl 5 milliGRAM(s) Oral at bedtime  pantoprazole    Tablet 40 milliGRAM(s) Oral before breakfast  polyethylene glycol 3350 17 Gram(s) Oral daily  senna 2 Tablet(s) Oral at bedtime PRN    dextrose 50% Injectable 25 Gram(s) IV Push once  dextrose 50% Injectable 12.5 Gram(s) IV Push once  dextrose Oral Gel 15 Gram(s) Oral once PRN  glucagon  Injectable 1 milliGRAM(s) IntraMuscular once  insulin glargine Injectable (LANTUS) 40 Unit(s) SubCutaneous at bedtime  insulin lispro (ADMELOG) corrective regimen sliding scale   SubCutaneous Before meals and at bedtime  insulin lispro Injectable (ADMELOG) 16 Unit(s) SubCutaneous three times a day before meals    artificial  tears Solution 1 Drop(s) Both EYES three times a day  benzocaine/menthol Lozenge 1 Lozenge Oral every 2 hours PRN  chlorhexidine 2% Cloths 1 Application(s) Topical daily  dextrose 10% Bolus 125 milliLiter(s) IV Bolus once  dextrose 5%. 1000 milliLiter(s) IV Continuous <Continuous>  dextrose 5%. 1000 milliLiter(s) IV Continuous <Continuous>  lidocaine   4% Patch 1 Patch Transdermal every 24 hours  lidocaine 2% Gel 1 Application(s) Topical two times a day PRN      PAST MEDICAL/SURGICAL HISTORY  PAST MEDICAL & SURGICAL HISTORY:  Diabetes      Neurogenic bladder      Chronic indwelling Salazar catheter      Hypertension      CAD (coronary artery disease)      History of appendectomy      S/P CABG (coronary artery bypass graft)          SOCIAL HISTORY: Substance Use (street drugs): ( x ) never used  (  ) other:    FAMILY HISTORY:  Family hx of lung cancer (Father)        PHYSICAL EXAM:  T(C): 36.7 (06-03-24 @ 14:32), Max: 36.7 (06-02-24 @ 17:46)  HR: 74 (06-03-24 @ 14:32) (74 - 86)  BP: 140/70 (06-03-24 @ 14:32) (106/73 - 157/90)  RR: 18 (06-03-24 @ 14:32) (17 - 18)  SpO2: 99% (06-03-24 @ 14:32) (95% - 99%)  Wt(kg): --  I&O's Summary    02 Jun 2024 07:01  -  03 Jun 2024 07:00  --------------------------------------------------------  IN: 0 mL / OUT: 4225 mL / NET: -4225 mL    03 Jun 2024 07:01  -  03 Jun 2024 15:54  --------------------------------------------------------  IN: 500 mL / OUT: 500 mL / NET: 0 mL        GENERAL: NAD  EYES:   PERRLA   ENMT:   Moist mucous membranes, Good dentition, No lesions  Cardiovascular: Normal S1 S2, No JVD, No murmurs, No edema  Respiratory: Lungs clear to auscultation	  Gastrointestinal:  Soft, Non-tender, + BS	  Extremities: no edema                              14.3   8.47  )-----------( 268      ( 03 Jun 2024 06:45 )             41.4     06-03    135  |  99  |  27<H>  ----------------------------<  151<H>  4.2   |  24  |  1.04    Ca    10.2      03 Jun 2024 06:45      proBNP:   Lipid Profile:   HgA1c:   TSH:     Consultant(s) Notes Reviewed:  [x ] YES  [ ] NO    Care Discussed with Consultants/Other Providers [ x] YES  [ ] NO    Imaging Personally Reviewed independently:  [x] YES  [ ] NO    All labs, radiologic studies, vitals, orders and medications list reviewed. Patient is seen and examined at bedside. Case discussed with medical team.

## 2024-06-03 NOTE — PROGRESS NOTE ADULT - SUBJECTIVE AND OBJECTIVE BOX
[facility] Division of Hospital Medicine  Roland Harmon MD  Pager (R-F, 0R-[phone]): [phone]  Other Times:  [phone]    Patient is a 74y old  Male who presents with a chief complaint of BL LE weakness (03 Jun 2024 05:32)      SUBJECTIVE / OVERNIGHT EVENTS: No acute events; afebrile; HMVAC removed        MEDICATIONS  (STANDING):  artificial  tears Solution 1 Drop(s) Both EYES three times a day  bisacodyl 5 milliGRAM(s) Oral at bedtime  chlorhexidine 2% Cloths 1 Application(s) Topical daily  dextrose 10% Bolus 125 milliLiter(s) IV Bolus once  dextrose 5%. 1000 milliLiter(s) (100 mL/Hr) IV Continuous <Continuous>  dextrose 5%. 1000 milliLiter(s) (50 mL/Hr) IV Continuous <Continuous>  dextrose 50% Injectable 25 Gram(s) IV Push once  dextrose 50% Injectable 12.5 Gram(s) IV Push once  enalapril 20 milliGRAM(s) Oral two times a day  enalaprilat Injectable 2.5 milliGRAM(s) IV Push once  enoxaparin Injectable 40 milliGRAM(s) SubCutaneous every 24 hours  glucagon  Injectable 1 milliGRAM(s) IntraMuscular once  insulin glargine Injectable (LANTUS) 40 Unit(s) SubCutaneous at bedtime  insulin lispro (ADMELOG) corrective regimen sliding scale   SubCutaneous Before meals and at bedtime  insulin lispro Injectable (ADMELOG) 16 Unit(s) SubCutaneous three times a day before meals  lidocaine   4% Patch 1 Patch Transdermal every 24 hours  metoprolol succinate ER 50 milliGRAM(s) Oral daily  pantoprazole    Tablet 40 milliGRAM(s) Oral before breakfast  polyethylene glycol 3350 17 Gram(s) Oral daily    MEDICATIONS  (PRN):  acetaminophen   Oral Liquid .. 650 milliGRAM(s) Oral every 6 hours PRN Temp greater or equal to 38C (100.4F), Mild Pain (1 - 3)  benzocaine/menthol Lozenge 1 Lozenge Oral every 2 hours PRN Sore Throat  cyclobenzaprine 5 milliGRAM(s) Oral three times a day PRN Muscle Spasm  dextrose Oral Gel 15 Gram(s) Oral once PRN Blood Glucose LESS THAN 70 milliGRAM(s)/deciliter  lidocaine 2% Gel 1 Application(s) Topical two times a day PRN pain  melatonin 3 milliGRAM(s) Oral at bedtime PRN Insomnia  senna 2 Tablet(s) Oral at bedtime PRN Constipation  traMADol 50 milliGRAM(s) Oral every 6 hours PRN Moderate Pain (4 - 6)      CAPILLARY BLOOD GLUCOSE      POCT Blood Glucose.: 157 mg/dL (03 Jun 2024 07:22)  POCT Blood Glucose.: 239 mg/dL (02 Jun 2024 22:43)  POCT Blood Glucose.: 145 mg/dL (02 Jun 2024 21:13)  POCT Blood Glucose.: 90 mg/dL (02 Jun 2024 16:36)  POCT Blood Glucose.: 116 mg/dL (02 Jun 2024 11:31)    I&O's Summary    02 Jun 2024 07:01  -  03 Jun 2024 07:00  --------------------------------------------------------  IN: 0 mL / OUT: 4225 mL / NET: -4225 mL        PHYSICAL EXAM:  Vital Signs Last 24 Hrs  T(C): 36.3 (03 Jun 2024 05:30), Max: 36.7 (02 Jun 2024 17:46)  T(F): 97.4 (03 Jun 2024 05:30), Max: 98 (02 Jun 2024 17:46)  HR: 78 (03 Jun 2024 05:30) (75 - 86)  BP: 157/90 (03 Jun 2024 05:30) (106/73 - 157/90)  BP(mean): --  RR: 17 (03 Jun 2024 05:30) (17 - 18)  SpO2: 96% (03 Jun 2024 05:30) (95% - 99%)    Parameters below as of 03 Jun 2024 05:30  Patient On (Oxygen Delivery Method): room air      CONSTITUTIONAL: NAD  NECK: steri strips in place   RESPIRATORY: Normal respiratory effort; lungs are clear to auscultation bilaterally  CARDIOVASCULAR: Regular rate and rhythm, normal S1 and S2, no murmur/rub/gallop;   ABDOMEN: Nontender to palpation, normoactive bowel sounds, no rebound/guarding;   PSYCH: AA answers questions appropriately      LABS:                        14.3   8.47  )-----------( 268      ( 03 Jun 2024 06:45 )             41.4     06-03    135  |  99  |  27<H>  ----------------------------<  151<H>  4.2   |  24  |  1.04    Ca    10.2      03 Jun 2024 06:45            Urinalysis Basic - ( 03 Jun 2024 06:45 )    Color: x / Appearance: x / SG: x / pH: x  Gluc: 151 mg/dL / Ketone: x  / Bili: x / Urobili: x   Blood: x / Protein: x / Nitrite: x   Leuk Esterase: x / RBC: x / WBC x   Sq Epi: x / Non Sq Epi: x / Bacteria: x          RADIOLOGY & ADDITIONAL TESTS:  Results Reviewed:   Imaging Personally Reviewed:  Electrocardiogram Personally Reviewed:    COORDINATION OF CARE:  Care Discussed with Consultants/Other Providers [Y/N]:  Prior or Outpatient Records Reviewed [Y/N]:

## 2024-06-03 NOTE — PROGRESS NOTE ADULT - PROBLEM SELECTOR PLAN 1
- s/p ACDF  - possible nonluminal esophageal injury closed with stitch, was npo, ENT following, xray done wo pneumomediastinum, now back on reg diet per primary team

## 2024-06-03 NOTE — PROGRESS NOTE ADULT - ASSESSMENT
72 yo M PMHx DM, spinal stenosis complicated by neurogenic bladder with chronic Salazar presenting for lower extremity weakness. now post ACDF

## 2024-06-03 NOTE — PROGRESS NOTE ADULT - SUBJECTIVE AND OBJECTIVE BOX
No issues overnight    Vital Signs Last 24 Hrs  T(C): 36.6 (02 Jun 2024 22:02), Max: 37 (02 Jun 2024 06:50)  T(F): 97.8 (02 Jun 2024 22:02), Max: 98.6 (02 Jun 2024 06:50)  HR: 86 (02 Jun 2024 22:02) (75 - 86)  BP: 137/84 (02 Jun 2024 22:02) (106/73 - 137/84)  BP(mean): --  ABP: --  ABP(mean): --  RR: 18 (02 Jun 2024 22:02) (17 - 18)  SpO2: 99% (02 Jun 2024 22:02) (95% - 99%)    O2 Parameters below as of 02 Jun 2024 22:02  Patient On (Oxygen Delivery Method): room air            AAO X 3  PERRLA, EOMI  Face symmetric  LOJA strength 5/5  SILT    MEDICATIONS  (STANDING):  artificial  tears Solution 1 Drop(s) Both EYES three times a day  bisacodyl 5 milliGRAM(s) Oral at bedtime  chlorhexidine 2% Cloths 1 Application(s) Topical daily  dextrose 10% Bolus 125 milliLiter(s) IV Bolus once  dextrose 5%. 1000 milliLiter(s) (100 mL/Hr) IV Continuous <Continuous>  dextrose 5%. 1000 milliLiter(s) (50 mL/Hr) IV Continuous <Continuous>  dextrose 50% Injectable 25 Gram(s) IV Push once  dextrose 50% Injectable 12.5 Gram(s) IV Push once  enalapril 20 milliGRAM(s) Oral two times a day  enalaprilat Injectable 2.5 milliGRAM(s) IV Push once  enoxaparin Injectable 40 milliGRAM(s) SubCutaneous every 24 hours  glucagon  Injectable 1 milliGRAM(s) IntraMuscular once  insulin glargine Injectable (LANTUS) 40 Unit(s) SubCutaneous at bedtime  insulin lispro (ADMELOG) corrective regimen sliding scale   SubCutaneous Before meals and at bedtime  insulin lispro Injectable (ADMELOG) 16 Unit(s) SubCutaneous three times a day before meals  lidocaine   4% Patch 1 Patch Transdermal every 24 hours  metoprolol succinate ER 50 milliGRAM(s) Oral daily  pantoprazole    Tablet 40 milliGRAM(s) Oral before breakfast  polyethylene glycol 3350 17 Gram(s) Oral daily    MEDICATIONS  (PRN):  acetaminophen   Oral Liquid .. 650 milliGRAM(s) Oral every 6 hours PRN Temp greater or equal to 38C (100.4F), Mild Pain (1 - 3)  benzocaine/menthol Lozenge 1 Lozenge Oral every 2 hours PRN Sore Throat  cyclobenzaprine 5 milliGRAM(s) Oral three times a day PRN Muscle Spasm  dextrose Oral Gel 15 Gram(s) Oral once PRN Blood Glucose LESS THAN 70 milliGRAM(s)/deciliter  lidocaine 2% Gel 1 Application(s) Topical two times a day PRN pain  melatonin 3 milliGRAM(s) Oral at bedtime PRN Insomnia  senna 2 Tablet(s) Oral at bedtime PRN Constipation  traMADol 50 milliGRAM(s) Oral every 6 hours PRN Moderate Pain (4 - 6)

## 2024-06-03 NOTE — PROGRESS NOTE ADULT - SUBJECTIVE AND OBJECTIVE BOX
Patient is a 74y old  Male who presents with a chief complaint of BL LE weakness (03 Jun 2024 05:32)      HPI:  Mr. Mayorga is a 73-year-old male with past medical history of IDDM2, spinal stenosis complicated by neurogenic bladder with chronic Hernandez, HTN presenting for lower extremity weakness, per pt there is always component of baseline weakness as pt has used a cane to walk in the past. This last week pt reported struggling to ambulate due to pain going down b/l lateral legs along with numbness on anterolateral surface of leg. Pt reported taking tylenol and Aleve for these symptoms but there was no symptom improvement. Pt reports having to use furniture and a walker to ambulate. Pt denies fevers, chills, chest pain, dysuria, saddle anesthesia or fecal incontinence Pt has chronic hx of urinary incontinence in the context of spinal stenosis. Pt was previous found to be ESBL + in early may (patient reports history of Proteus infection in December 2023 s/p abx with recurrence shortly thereafter).     Hernandez was changed on Wednesday in the DR.    In the ED VS notable for BP of 160/80. In the ED labs  notable for  and UA + for LE, nitrates, and bacteria. Imaging was notable for cervical stenosis and lordosis and thoracolumbar spine spondylosis.  In the ED pt received Zosyn and 0.5L NS.  (19 May 2024 21:20)      REVIEW OF SYSTEMS  impaired sensation  pain  weakness    PAST MEDICAL & SURGICAL HISTORY  Diabetes  Neurogenic bladder  Chronic indwelling Hernandez catheter  Hypertension  CAD (coronary artery disease)  History of appendectomy  S/P CABG (coronary artery bypass graft)       CURRENT FUNCTIONAL STATUS  6/2 Bed Mobility  Bed Mobility Training Rehab Potential: good, to achieve stated therapy goals  Bed Mobility Training Symptoms Noted During/After Treatment: none  Bed Mobility Training Rolling/Turning: stand-by assist;  supervision  Bed Mobility Training Sit-to-Supine: stand-by assist;  supervision  Bed Mobility Training Supine-to-Sit: stand-by assist;  supervision;  bed rails  Bed Mobility Training Limitations: decreased strength;  decreased flexibility;  decreased ability to use legs for bridging/pushing;  impaired postural control    Sit-Stand Transfer Training  Sit-to-Stand Transfer Training Rehab Potential: good, to achieve stated therapy goals  Sit-to-Stand Transfer Training Symptoms Noted During/After Treatment: none  Transfer Training Sit-to-Stand Transfer: stand-by assist;  supervision;  weight-bearing as tolerated   rolling walker  Transfer Training Stand-to-Sit Transfer: stand-by assist;  supervision;  weight-bearing as tolerated   rolling walker  Sit-to-Stand Transfer Training Transfer Safety Analysis: decreased step length;  decreased weight-shifting ability;  decreased balance;  decreased strength;  decreased flexibility;  impaired balance;  rolling walker    Gait Training  Gait Training Rehab Potential: good, to achieve stated therapy goals  Gait Training Symptoms Noted During/After Treatment: none  Gait Training: weight-bearing as tolerated   rolling walker;  75 feet;  verbal cues;  stand-by assist  Gait Analysis: 2-point gait   increased time in double stance;  decreased geena;  decreased stride length;  decreased step length;  decreased flexibility;  decreased strength;  impaired coordination;  impaired balance;  75 feet;  rolling walker  Brace/Orthotics Brace/Orthotics: cervical collar  Gait Number of Times:: x 2  Type of Rest Type of Rest: sitting  Duration of Rest Duration of Rest: 5 minutes in car.     Stair Training  Physical Assist/Nonphysical Assist: verbal cues;  supervision;  step over step.   Weight-Bearing Restrictions: weight-bearing as tolerated  Assistive Device: right rail up;  left rail down  Number of Stairs: 12     Therapeutic Exercise  Therapeutic Exercise Detail: car transfer performed within spinal precautions, good safety and technique. Encouraged continued PT participation to maintain progress with functional mobility and strength. Reviewed and encouraged home exercise program in pain-free ROM to maintain strength and circulation while in the hospital.       FAMILY HISTORY   Family hx of lung cancer (Father)        RECENT LABS/IMAGING  CBC Full  -  ( 03 Jun 2024 06:45 )  WBC Count : 8.47 K/uL  RBC Count : 4.67 M/uL  Hemoglobin : 14.3 g/dL  Hematocrit : 41.4 %  Platelet Count - Automated : 268 K/uL  Mean Cell Volume : 88.7 fL  Mean Cell Hemoglobin : 30.6 pg  Mean Cell Hemoglobin Concentration : 34.5 gm/dL  Auto Neutrophil # : x  Auto Lymphocyte # : x  Auto Monocyte # : x  Auto Eosinophil # : x  Auto Basophil # : x  Auto Neutrophil % : x  Auto Lymphocyte % : x  Auto Monocyte % : x  Auto Eosinophil % : x  Auto Basophil % : x    06-03    135  |  99  |  27<H>  ----------------------------<  151<H>  4.2   |  24  |  1.04    Ca    10.2      03 Jun 2024 06:45      Urinalysis Basic - ( 03 Jun 2024 06:45 )    Color: x / Appearance: x / SG: x / pH: x  Gluc: 151 mg/dL / Ketone: x  / Bili: x / Urobili: x   Blood: x / Protein: x / Nitrite: x   Leuk Esterase: x / RBC: x / WBC x   Sq Epi: x / Non Sq Epi: x / Bacteria: x        VITALS  T(C): 36.3 (06-03-24 @ 05:30), Max: 36.7 (06-02-24 @ 17:46)  HR: 78 (06-03-24 @ 05:30) (75 - 86)  BP: 157/90 (06-03-24 @ 05:30) (106/73 - 157/90)  RR: 17 (06-03-24 @ 05:30) (17 - 18)  SpO2: 96% (06-03-24 @ 05:30) (95% - 99%)  Wt(kg): --    ALLERGIES  No Known Allergies      MEDICATIONS   acetaminophen   Oral Liquid .. 650 milliGRAM(s) Oral every 6 hours PRN  artificial  tears Solution 1 Drop(s) Both EYES three times a day  benzocaine/menthol Lozenge 1 Lozenge Oral every 2 hours PRN  bisacodyl 5 milliGRAM(s) Oral at bedtime  chlorhexidine 2% Cloths 1 Application(s) Topical daily  cyclobenzaprine 5 milliGRAM(s) Oral three times a day PRN  dextrose 10% Bolus 125 milliLiter(s) IV Bolus once  dextrose 5%. 1000 milliLiter(s) IV Continuous <Continuous>  dextrose 5%. 1000 milliLiter(s) IV Continuous <Continuous>  dextrose 50% Injectable 25 Gram(s) IV Push once  dextrose 50% Injectable 12.5 Gram(s) IV Push once  dextrose Oral Gel 15 Gram(s) Oral once PRN  enalapril 20 milliGRAM(s) Oral two times a day  enalaprilat Injectable 2.5 milliGRAM(s) IV Push once  enoxaparin Injectable 40 milliGRAM(s) SubCutaneous every 24 hours  glucagon  Injectable 1 milliGRAM(s) IntraMuscular once  insulin glargine Injectable (LANTUS) 40 Unit(s) SubCutaneous at bedtime  insulin lispro (ADMELOG) corrective regimen sliding scale   SubCutaneous Before meals and at bedtime  insulin lispro Injectable (ADMELOG) 16 Unit(s) SubCutaneous three times a day before meals  lidocaine   4% Patch 1 Patch Transdermal every 24 hours  lidocaine 2% Gel 1 Application(s) Topical two times a day PRN  melatonin 3 milliGRAM(s) Oral at bedtime PRN  metoprolol succinate ER 50 milliGRAM(s) Oral daily  pantoprazole    Tablet 40 milliGRAM(s) Oral before breakfast  polyethylene glycol 3350 17 Gram(s) Oral daily  senna 2 Tablet(s) Oral at bedtime PRN  traMADol 50 milliGRAM(s) Oral every 6 hours PRN      ----------------------------------------------------------------------------------------  PHYSICAL EXAM   PHYSICAL EXAM  Constitutional - NAD, Comfortable  HEENT - NCAT, EOMI  + anterior neck dressing   Chest - no respiratory distress  Cardiovascular - RRR, S1S2   Abdomen -  Soft, NTND  Extremities - No C/C/E, No calf tenderness   Neurologic Exam -                    Cognitive - Awake, Alert, AAO to self, place, date, year, situation     Communication - Fluent, No dysarthria     Cranial Nerves - CN 2-12 intact     Motor -                      LEFT    UE - ShAB 5/5, EF 5/5, EE 5/5, WE 5/5,  5/5                    RIGHT UE - ShAB 5/5, EF 5/5, EE 5/5, WE 5/5,  5/5                    LEFT    LE - HF 4+/5, KE 5/5, DF 5/5, PF 5/5                    RIGHT LE - HF 4+/5, KE 5/5, DF 5/5, PF 5/5        Sensory - Intact to LT     Balance - WNL Static  Psychiatric - Mood stable, Affect WNL  ----------------------------------------------------------------------------------------  ASSESSMENT/PLAN  73 yo m s/p acdf  diet- regular  Pain - tylenol prn, flexeril, lidocaine patch, oxy ir prn with bowel regimen  hernandez for neurogenic bladder  htn: toprol, enalapril  continue bedside PT and OT    DVT PPX - scd  Rehab -     incomplete, follow up in progress         Patient is a 74y old  Male who presents with a chief complaint of BL LE weakness (03 Jun 2024 05:32)      HPI:  Mr. Mayorga is a 73-year-old male with past medical history of IDDM2, spinal stenosis complicated by neurogenic bladder with chronic Hernandez, HTN presenting for lower extremity weakness, per pt there is always component of baseline weakness as pt has used a cane to walk in the past. This last week pt reported struggling to ambulate due to pain going down b/l lateral legs along with numbness on anterolateral surface of leg. Pt reported taking tylenol and Aleve for these symptoms but there was no symptom improvement. Pt reports having to use furniture and a walker to ambulate. Pt denies fevers, chills, chest pain, dysuria, saddle anesthesia or fecal incontinence Pt has chronic hx of urinary incontinence in the context of spinal stenosis. Pt was previous found to be ESBL + in early may (patient reports history of Proteus infection in December 2023 s/p abx with recurrence shortly thereafter).     Hernandez was changed on Wednesday in the DR.    In the ED VS notable for BP of 160/80. In the ED labs  notable for  and UA + for LE, nitrates, and bacteria. Imaging was notable for cervical stenosis and lordosis and thoracolumbar spine spondylosis.  In the ED pt received Zosyn and 0.5L NS.  (19 May 2024 21:20)      REVIEW OF SYSTEMS  impaired sensation  pain  weakness    PAST MEDICAL & SURGICAL HISTORY  Diabetes  Neurogenic bladder  Chronic indwelling Hernandez catheter  Hypertension  CAD (coronary artery disease)  History of appendectomy  S/P CABG (coronary artery bypass graft)       CURRENT FUNCTIONAL STATUS  6/2 Bed Mobility  Bed Mobility Training Rehab Potential: good, to achieve stated therapy goals  Bed Mobility Training Symptoms Noted During/After Treatment: none  Bed Mobility Training Rolling/Turning: stand-by assist;  supervision  Bed Mobility Training Sit-to-Supine: stand-by assist;  supervision  Bed Mobility Training Supine-to-Sit: stand-by assist;  supervision;  bed rails  Bed Mobility Training Limitations: decreased strength;  decreased flexibility;  decreased ability to use legs for bridging/pushing;  impaired postural control    Sit-Stand Transfer Training  Sit-to-Stand Transfer Training Rehab Potential: good, to achieve stated therapy goals  Sit-to-Stand Transfer Training Symptoms Noted During/After Treatment: none  Transfer Training Sit-to-Stand Transfer: stand-by assist;  supervision;  weight-bearing as tolerated   rolling walker  Transfer Training Stand-to-Sit Transfer: stand-by assist;  supervision;  weight-bearing as tolerated   rolling walker  Sit-to-Stand Transfer Training Transfer Safety Analysis: decreased step length;  decreased weight-shifting ability;  decreased balance;  decreased strength;  decreased flexibility;  impaired balance;  rolling walker    Gait Training  Gait Training Rehab Potential: good, to achieve stated therapy goals  Gait Training Symptoms Noted During/After Treatment: none  Gait Training: weight-bearing as tolerated   rolling walker;  75 feet;  verbal cues;  stand-by assist  Gait Analysis: 2-point gait   increased time in double stance;  decreased geena;  decreased stride length;  decreased step length;  decreased flexibility;  decreased strength;  impaired coordination;  impaired balance;  75 feet;  rolling walker  Brace/Orthotics Brace/Orthotics: cervical collar  Gait Number of Times:: x 2  Type of Rest Type of Rest: sitting  Duration of Rest Duration of Rest: 5 minutes in car.     Stair Training  Physical Assist/Nonphysical Assist: verbal cues;  supervision;  step over step.   Weight-Bearing Restrictions: weight-bearing as tolerated  Assistive Device: right rail up;  left rail down  Number of Stairs: 12     Therapeutic Exercise  Therapeutic Exercise Detail: car transfer performed within spinal precautions, good safety and technique. Encouraged continued PT participation to maintain progress with functional mobility and strength. Reviewed and encouraged home exercise program in pain-free ROM to maintain strength and circulation while in the hospital.       FAMILY HISTORY   Family hx of lung cancer (Father)        RECENT LABS/IMAGING  CBC Full  -  ( 03 Jun 2024 06:45 )  WBC Count : 8.47 K/uL  RBC Count : 4.67 M/uL  Hemoglobin : 14.3 g/dL  Hematocrit : 41.4 %  Platelet Count - Automated : 268 K/uL  Mean Cell Volume : 88.7 fL  Mean Cell Hemoglobin : 30.6 pg  Mean Cell Hemoglobin Concentration : 34.5 gm/dL  Auto Neutrophil # : x  Auto Lymphocyte # : x  Auto Monocyte # : x  Auto Eosinophil # : x  Auto Basophil # : x  Auto Neutrophil % : x  Auto Lymphocyte % : x  Auto Monocyte % : x  Auto Eosinophil % : x  Auto Basophil % : x    06-03    135  |  99  |  27<H>  ----------------------------<  151<H>  4.2   |  24  |  1.04    Ca    10.2      03 Jun 2024 06:45      Urinalysis Basic - ( 03 Jun 2024 06:45 )    Color: x / Appearance: x / SG: x / pH: x  Gluc: 151 mg/dL / Ketone: x  / Bili: x / Urobili: x   Blood: x / Protein: x / Nitrite: x   Leuk Esterase: x / RBC: x / WBC x   Sq Epi: x / Non Sq Epi: x / Bacteria: x        VITALS  T(C): 36.3 (06-03-24 @ 05:30), Max: 36.7 (06-02-24 @ 17:46)  HR: 78 (06-03-24 @ 05:30) (75 - 86)  BP: 157/90 (06-03-24 @ 05:30) (106/73 - 157/90)  RR: 17 (06-03-24 @ 05:30) (17 - 18)  SpO2: 96% (06-03-24 @ 05:30) (95% - 99%)  Wt(kg): --    ALLERGIES  No Known Allergies      MEDICATIONS   acetaminophen   Oral Liquid .. 650 milliGRAM(s) Oral every 6 hours PRN  artificial  tears Solution 1 Drop(s) Both EYES three times a day  benzocaine/menthol Lozenge 1 Lozenge Oral every 2 hours PRN  bisacodyl 5 milliGRAM(s) Oral at bedtime  chlorhexidine 2% Cloths 1 Application(s) Topical daily  cyclobenzaprine 5 milliGRAM(s) Oral three times a day PRN  dextrose 10% Bolus 125 milliLiter(s) IV Bolus once  dextrose 5%. 1000 milliLiter(s) IV Continuous <Continuous>  dextrose 5%. 1000 milliLiter(s) IV Continuous <Continuous>  dextrose 50% Injectable 25 Gram(s) IV Push once  dextrose 50% Injectable 12.5 Gram(s) IV Push once  dextrose Oral Gel 15 Gram(s) Oral once PRN  enalapril 20 milliGRAM(s) Oral two times a day  enalaprilat Injectable 2.5 milliGRAM(s) IV Push once  enoxaparin Injectable 40 milliGRAM(s) SubCutaneous every 24 hours  glucagon  Injectable 1 milliGRAM(s) IntraMuscular once  insulin glargine Injectable (LANTUS) 40 Unit(s) SubCutaneous at bedtime  insulin lispro (ADMELOG) corrective regimen sliding scale   SubCutaneous Before meals and at bedtime  insulin lispro Injectable (ADMELOG) 16 Unit(s) SubCutaneous three times a day before meals  lidocaine   4% Patch 1 Patch Transdermal every 24 hours  lidocaine 2% Gel 1 Application(s) Topical two times a day PRN  melatonin 3 milliGRAM(s) Oral at bedtime PRN  metoprolol succinate ER 50 milliGRAM(s) Oral daily  pantoprazole    Tablet 40 milliGRAM(s) Oral before breakfast  polyethylene glycol 3350 17 Gram(s) Oral daily  senna 2 Tablet(s) Oral at bedtime PRN  traMADol 50 milliGRAM(s) Oral every 6 hours PRN      ----------------------------------------------------------------------------------------  PHYSICAL EXAM   PHYSICAL EXAM  Constitutional - NAD, Comfortable  HEENT - NCAT, EOMI  + anterior neck dressing   Chest - no respiratory distress  Cardiovascular - RRR, S1S2   Abdomen -  Soft, NTND  Extremities - No C/C/E, No calf tenderness   Neurologic Exam -                    Cognitive - sleeping but awakens to voice     Communication - Fluent, No dysarthria     Cranial Nerves - CN 2-12 intact     Motor -  exam unchanged      Sensory - Intact to LT     Balance - WNL Static  Psychiatric - Mood stable, Affect WNL  ----------------------------------------------------------------------------------------  ASSESSMENT/PLAN  75 yo m s/p acdf  diet- regular  Pain - tylenol prn, flexeril, lidocaine patch, oxy ir prn with bowel regimen  hernandez for neurogenic bladder  htn: toprol, enalapril  continue bedside PT and OT    DVT PPX - scd  Rehab -   recommend home with home care vs outpatient PT when medically cleared

## 2024-06-04 VITALS
SYSTOLIC BLOOD PRESSURE: 139 MMHG | RESPIRATION RATE: 17 BRPM | TEMPERATURE: 98 F | OXYGEN SATURATION: 97 % | DIASTOLIC BLOOD PRESSURE: 70 MMHG | HEART RATE: 76 BPM

## 2024-06-04 LAB
GLUCOSE BLDC GLUCOMTR-MCNC: 123 MG/DL — HIGH (ref 70–99)
GLUCOSE BLDC GLUCOMTR-MCNC: 159 MG/DL — HIGH (ref 70–99)

## 2024-06-04 PROCEDURE — 99232 SBSQ HOSP IP/OBS MODERATE 35: CPT

## 2024-06-04 RX ORDER — INSULIN ASPART 100 [IU]/ML
16 INJECTION, SOLUTION SUBCUTANEOUS
Qty: 1 | Refills: 0
Start: 2024-06-04 | End: 2024-07-03

## 2024-06-04 RX ORDER — INSULIN GLARGINE 100 [IU]/ML
40 INJECTION, SOLUTION SUBCUTANEOUS
Qty: 15 | Refills: 0
Start: 2024-06-04 | End: 2024-07-03

## 2024-06-04 RX ORDER — INSULIN ASPART 100 [IU]/ML
16 INJECTION, SOLUTION SUBCUTANEOUS
Qty: 15 | Refills: 0
Start: 2024-06-04 | End: 2024-07-03

## 2024-06-04 RX ORDER — METFORMIN HYDROCHLORIDE 850 MG/1
1 TABLET ORAL
Refills: 0 | DISCHARGE

## 2024-06-04 RX ORDER — LIDOCAINE 4 G/100G
1 CREAM TOPICAL ONCE
Refills: 0 | Status: COMPLETED | OUTPATIENT
Start: 2024-06-04 | End: 2024-06-04

## 2024-06-04 RX ORDER — INSULIN GLARGINE 100 [IU]/ML
40 INJECTION, SOLUTION SUBCUTANEOUS
Qty: 1 | Refills: 0
Start: 2024-06-04 | End: 2024-07-03

## 2024-06-04 RX ORDER — METFORMIN HYDROCHLORIDE 850 MG/1
1 TABLET ORAL
Qty: 60 | Refills: 0
Start: 2024-06-04 | End: 2024-07-03

## 2024-06-04 RX ORDER — METOPROLOL TARTRATE 50 MG
1 TABLET ORAL
Qty: 30 | Refills: 0
Start: 2024-06-04 | End: 2024-07-03

## 2024-06-04 RX ORDER — METOPROLOL TARTRATE 50 MG
1 TABLET ORAL
Refills: 0 | DISCHARGE

## 2024-06-04 RX ADMIN — Medication 16 UNIT(S): at 11:38

## 2024-06-04 RX ADMIN — Medication 16 UNIT(S): at 07:26

## 2024-06-04 RX ADMIN — ENOXAPARIN SODIUM 40 MILLIGRAM(S): 100 INJECTION SUBCUTANEOUS at 00:35

## 2024-06-04 RX ADMIN — Medication 650 MILLIGRAM(S): at 05:40

## 2024-06-04 RX ADMIN — Medication 20 MILLIGRAM(S): at 06:57

## 2024-06-04 RX ADMIN — Medication 50 MILLIGRAM(S): at 06:57

## 2024-06-04 RX ADMIN — LIDOCAINE 1 PATCH: 4 CREAM TOPICAL at 07:00

## 2024-06-04 RX ADMIN — LIDOCAINE 1 PATCH: 4 CREAM TOPICAL at 02:00

## 2024-06-04 RX ADMIN — Medication 1: at 07:26

## 2024-06-04 RX ADMIN — TRAMADOL HYDROCHLORIDE 50 MILLIGRAM(S): 50 TABLET ORAL at 00:43

## 2024-06-04 RX ADMIN — LIDOCAINE 1 PATCH: 4 CREAM TOPICAL at 02:34

## 2024-06-04 RX ADMIN — Medication 100 MILLIGRAM(S): at 06:58

## 2024-06-04 RX ADMIN — Medication 1 DROP(S): at 06:57

## 2024-06-04 RX ADMIN — Medication 650 MILLIGRAM(S): at 04:40

## 2024-06-04 RX ADMIN — PANTOPRAZOLE SODIUM 40 MILLIGRAM(S): 20 TABLET, DELAYED RELEASE ORAL at 06:57

## 2024-06-04 RX ADMIN — TRAMADOL HYDROCHLORIDE 50 MILLIGRAM(S): 50 TABLET ORAL at 07:24

## 2024-06-04 NOTE — PROGRESS NOTE ADULT - PROBLEM SELECTOR PROBLEM 1
Weakness
Foraminal stenosis of cervical region
S/P cervical spinal fusion
Weakness
S/P cervical spinal fusion
Weakness
S/P cervical spinal fusion
Weakness
Foraminal stenosis of cervical region
S/P cervical spinal fusion
Weakness
Weakness
S/P cervical spinal fusion
DDD (degenerative disc disease), cervical
Weakness

## 2024-06-04 NOTE — PROGRESS NOTE ADULT - PROBLEM SELECTOR PLAN 5
-Metop succinate 25 QD  - Aspirin 81 mg was held for OR - resume when ok from neurosx perspective   - S/P CABG in the past  - cards following
-Metop succinate 25 QD  -dc Aspirin 81 mg  -S/P CABG in the past  - cards consulted for preop clearance, consult appreciated, per cards -RCRI class III 10.1% Risk for 30 day MACE c/w metoprolol increase to 50 mg po daily , optimized from a cardiac standpoint   -echo showed  Left ventricular systolic function is mildly decreased with an ejection fraction visually estimated at 45 to 50%. There are regional wall motion abnormalities present. Hypokinesis of the inferolateral wall and basal inferior wall. Normal right ventricular cavity size and normal systolic function.
-Metop succinate 25 QD  - Aspirin 81 mg was held for OR - resume when ok from neurosx perspective   - S/P CABG in the past  - cards following
-Metop succinate 25 QD  -dc Aspirin 81 mg  -S/P CABG in the past  - cards consulted for preop clearance, consult appreciated, per cards -RCRI class III 10.1% Risk for 30 day MACE c/w metoprolol increase to 50 mg po daily , optimized from a cardiac standpoint   -echo showed  Left ventricular systolic function is mildly decreased with an ejection fraction visually estimated at 45 to 50%. There are regional wall motion abnormalities present. Hypokinesis of the inferolateral wall and basal inferior wall. Normal right ventricular cavity size and normal systolic function.
-Metop succinate 25 QD  -dc Aspirin 81 mg  -S/P CABG in the past  - cards consulted for preop clearance, consult appreciated, per cards -RCRI class III 10.1% Risk for 30 day MACE c/w metoprolol increase to 50 mg po daily , optimized from a cardiac standpoint   -echo showed  Left ventricular systolic function is mildly decreased with an ejection fraction visually estimated at 45 to 50%. There are regional wall motion abnormalities present. Hypokinesis of the inferolateral wall and basal inferior wall. Normal right ventricular cavity size and normal systolic function.
- Metop succinate 25 QD  - Aspirin 81 mg was held for OR - resume when ok from neurosx perspective   - S/P CABG in the past  - cards following
-Metop succinate 25 QD  -dc Aspirin 81 mg  -S/P CABG in the past  - cards consulted for preop clearance, consult appreciated, per cards -RCRI class III 10.1% Risk for 30 day MACE c/w metoprolol increase to 50 mg po daily , optimized from a cardiac standpoint   -echo showed  Left ventricular systolic function is mildly decreased with an ejection fraction visually estimated at 45 to 50%. There are regional wall motion abnormalities present. Hypokinesis of the inferolateral wall and basal inferior wall. Normal right ventricular cavity size and normal systolic function.
-Metop succinate 25 QD and Aspirin 81 mg  -S/P CABG in the past  - check ekg, f/u ekg, if qt is prolonged dc tramadol  - cards consulted for preop clearance  -echo showed  Left ventricular systolic function is mildly decreased with an ejection fraction visually estimated at 45 to 50%. There are regional wall motion abnormalities present. Hypokinesis of the inferolateral wall and basal inferior wall. Normal right ventricular cavity size and normal systolic function.
- Metop succinate 25 QD  - Aspirin 81 mg was held for OR - resume when ok from neurosx perspective   - S/P CABG in the past  - cards following
-Metop succinate 25 QD  - Aspirin 81 mg was held for OR - resume when ok from neurosx perspective   - S/P CABG in the past  - cards following
-Metop succinate 25 QD and Aspirin 81 mg  -S/P CABG in the past
-Metop succinate 25 QD  -dc Aspirin 81 mg  -S/P CABG in the past  - cards consulted for preop clearance, consult appreciated, per cards -RCRI class III 10.1% Risk for 30 day MACE c/w metoprolol increase to 50 mg po daily , optimized from a cardiac standpoint   -echo showed  Left ventricular systolic function is mildly decreased with an ejection fraction visually estimated at 45 to 50%. There are regional wall motion abnormalities present. Hypokinesis of the inferolateral wall and basal inferior wall. Normal right ventricular cavity size and normal systolic function.
-Metop succinate 25 QD and Aspirin 81 mg  -S/P CABG in the past

## 2024-06-04 NOTE — PROGRESS NOTE ADULT - SUBJECTIVE AND OBJECTIVE BOX
PAST 24HR EVENTS: No acute events overnight. Pending discharge     Vital Signs Last 24 Hrs  T(C): 36.7 (03 Jun 2024 22:21), Max: 36.8 (03 Jun 2024 17:11)  T(F): 98 (03 Jun 2024 22:21), Max: 98.2 (03 Jun 2024 17:11)  HR: 90 (03 Jun 2024 22:21) (74 - 90)  BP: 143/92 (03 Jun 2024 22:21) (129/76 - 158/74)  BP(mean): --  RR: 18 (03 Jun 2024 22:21) (17 - 18)  SpO2: 98% (03 Jun 2024 22:21) (96% - 99%)    Parameters below as of 03 Jun 2024 22:21  Patient On (Oxygen Delivery Method): room air        MEDS:   acetaminophen   Oral Liquid .. 650 milliGRAM(s) Oral every 6 hours PRN  artificial  tears Solution 1 Drop(s) Both EYES three times a day  benzocaine/menthol Lozenge 1 Lozenge Oral every 2 hours PRN  bisacodyl 5 milliGRAM(s) Oral at bedtime  chlorhexidine 2% Cloths 1 Application(s) Topical daily  cyclobenzaprine 5 milliGRAM(s) Oral three times a day PRN  dextrose 10% Bolus 125 milliLiter(s) IV Bolus once  dextrose 5%. 1000 milliLiter(s) IV Continuous <Continuous>  dextrose 5%. 1000 milliLiter(s) IV Continuous <Continuous>  dextrose 50% Injectable 25 Gram(s) IV Push once  dextrose 50% Injectable 12.5 Gram(s) IV Push once  dextrose Oral Gel 15 Gram(s) Oral once PRN  enalapril 20 milliGRAM(s) Oral two times a day  enalaprilat Injectable 2.5 milliGRAM(s) IV Push once  enoxaparin Injectable 40 milliGRAM(s) SubCutaneous every 24 hours  glucagon  Injectable 1 milliGRAM(s) IntraMuscular once  insulin glargine Injectable (LANTUS) 40 Unit(s) SubCutaneous at bedtime  insulin lispro (ADMELOG) corrective regimen sliding scale   SubCutaneous Before meals and at bedtime  insulin lispro Injectable (ADMELOG) 16 Unit(s) SubCutaneous three times a day before meals  lidocaine   4% Patch 1 Patch Transdermal every 24 hours  melatonin 3 milliGRAM(s) Oral at bedtime PRN  metoprolol succinate ER 50 milliGRAM(s) Oral daily  pantoprazole    Tablet 40 milliGRAM(s) Oral before breakfast  phenazopyridine 100 milliGRAM(s) Oral every 8 hours  traMADol 50 milliGRAM(s) Oral every 6 hours PRN      LABS:                        14.3   8.47  )-----------( 268      ( 03 Jun 2024 06:45 )             41.4     06-03    135  |  99  |  27<H>  ----------------------------<  151<H>  4.2   |  24  |  1.04    Ca    10.2      03 Jun 2024 06:45        PHYSICAL EXAM:  AOx3, FC  PERRL, EOMI  LOJA x 4 strong  SILT  Incision c/d/i

## 2024-06-04 NOTE — PROGRESS NOTE ADULT - PROBLEM SELECTOR PLAN 2
-+UA for Nitrates, LE, and bacteria pt has chronic hernandez, asymptomatic, without fevers and afebrile  -S/P zosyn, dc ertapenem, ID consult appreciated, per ID started on meropenem now dc and on augmentin thru 5/26  - UCx with E faecalis, ampi sens, dc meropenem and started on augmentin, BCx neg to date, per ID suspect this colonization rather than true infection.   -of note patient with known atrophic left kidney (dx'ed in 40s per pt) - monitor renal function/trend Cr  patient with known large bladder calculus- needs to f/u with urology as outpt   with reported recurrent history of Proteus infxn  R perinephric fat stranding noted on imaging
-+UA for Nitrates, LE, and bacteria pt has chronic hernandez, asymptomatic, without fevers and afebrile  -S/P zosyn, dc ertapenem, ID consult appreciated, per ID started on meropenem now dc and on augmentin thru 5/26  - UCx with E faecalis, ampi sens, dc meropenem and started on augmentin, augmentin completed on 5/26, BCx neg to date, per ID suspect this colonization rather than true infection.   -of note patient with known atrophic left kidney (dx'ed in 40s per pt) - monitor renal function/trend Cr  patient with known large bladder calculus- needs to f/u with urology as outpt   with reported recurrent history of Proteus infxn
-+UA for Nitrates, LE, and bacteria pt has chronic hernandez, asymptomatic, without fevers and afebrile  -S/P zosyn, dc ertapenem, ID consult appreciated, per ID started on meropenem now dc and on augmentin thru 5/26  - UCx with E faecalis, ampi sens, dc meropenem and started on augmentin, augmentin completed on 5/26, BCx neg to date, per ID suspect this colonization rather than true infection.   -of note patient with known atrophic left kidney (dx'ed in 40s per pt) - monitor renal function/trend Cr  patient with known large bladder calculus- needs to f/u with urology as outpt   with reported recurrent history of Proteus infxn
-+UA for Nitrates, LE, and bacteria pt has chronic hernandez, asymptomatic, without fevers and afebrile  -S/P zosyn, dc ertapenem, ID consult appreciated, per ID started on meropenem now dc and on augmentin thru 5/26  - UCx with E faecalis, ampi sens, dc meropenem and started on augmentin, augmentin completed on 5/26, BCx neg to date, per ID suspect this colonization rather than true infection.   -of note patient with known atrophic left kidney (dx'ed in 40s per pt) - monitor renal function/trend Cr  patient with known large bladder calculus- needs to f/u with urology as outpt   with reported recurrent history of Proteus infxn  R perinephric fat stranding noted on imaging
-+UA for Nitrates, LE, and bacteria pt has chronic hernandez, asymptomatic, without fevers and afebrile  -S/P zosyn, dc ertapenem, ID consult appreciated, per ID started on meropenem now dc and on augmentin thru 5/26  - UCx with E faecalis, ampi sens, dc meropenem and started on augmentin, augmentin completed on 5/26, BCx neg to date, per ID suspect this colonization rather than true infection.   -of note patient with known atrophic left kidney (dx'ed in 40s per pt) - monitor renal function/trend Cr  patient with known large bladder calculus- needs to f/u with urology as outpt   with reported recurrent history of Proteus infxn  R perinephric fat stranding noted on imaging
-+UA for Nitrates, LE, and bacteria pt has chronic hernandez, asymptomatic, without fevers and afebrile  - S/P zosyn, dc ertapenem, ID consult appreciated, per ID started on meropenem now dc and on augmentin thru 5/26  - UCx with E faecalis, ampi sens, dc meropenem and started on augmentin, augmentin completed on 5/26, BCx neg to date, per ID suspect this colonization rather than true infection.   - of note patient with known atrophic left kidney (dx'ed in 40s per pt) - monitor renal function/trend Cr  patient with known large bladder calculus- needs to f/u with urology as outpt   with reported recurrent history of Proteus infxn
-+UA for Nitrates, LE, and bacteria pt has chronic hernandez, asymptomatic, without fevers and afebrile  -S/P zosyn, dc ertapenem, ID consult appreciated, per ID started on meropenem now dc and on augmentin thru 5/26  - UCx with E faecalis, ampi sens, dc meropenem and started on augmentin, BCx neg to date, per ID suspect this colonization rather than true infection.   -of note patient with known atrophic left kidney (dx'ed in 40s per pt) - monitor renal function/trend Cr  patient with known large bladder calculus- needs to f/u with urology as outpt   with reported recurrent history of Proteus infxn  R perinephric fat stranding noted on imaging
-+UA for Nitrates, LE, and bacteria pt has chronic hernandez, asymptomatic, without fevers and afebrile  -S/P zosyn, currently on ertapenem will continue pending ID recs (consulted by ED), f/u recs  -f/u repeat UCx, BCx  -of note patient with known atrophic left kidney (dx'ed in 40s per pt) - monitor renal function/trend Cr    patient with known large bladder calculus   with reported recurrent history of Proteus infxn  R perinephric fat stranding noted on imaging  denies fevers/chills  f/u ID consult recs
-+UA for Nitrates, LE, and bacteria pt has chronic hernandez, asymptomatic, without fevers and afebrile  -S/P zosyn, dc ertapenem, ID consult appreciated, per ID started on meropenem now dc and on augmentin thru 5/26  - UCx with E faecalis, ampi sens, dc meropenem and started on augmentin, BCx neg to date, per ID suspect this colonization rather than true infection.   -of note patient with known atrophic left kidney (dx'ed in 40s per pt) - monitor renal function/trend Cr  patient with known large bladder calculus- needs to f/u with urology as outpt   with reported recurrent history of Proteus infxn  R perinephric fat stranding noted on imaging  denies fevers/chills
-+UA for Nitrates, LE, and bacteria pt has chronic hernandez, asymptomatic, without fevers and afebrile  -S/P zosyn, dc ertapenem, ID consult appreciated, per ID started on meropenem now dc and on augmentin thru 5/26  - UCx with E faecalis, ampi sens, dc meropenem and started on augmentin, BCx neg to date, per ID suspect this colonization rather than true infection.   -of note patient with known atrophic left kidney (dx'ed in 40s per pt) - monitor renal function/trend Cr  patient with known large bladder calculus- needs to f/u with urology as outpt   with reported recurrent history of Proteus infxn  R perinephric fat stranding noted on imaging
-+UA for Nitrates, LE, and bacteria pt has chronic hernandez, asymptomatic, without fevers and afebrile  -S/P zosyn, dc ertapenem, ID consult appreciated, per ID started on meropenem now dc and on augmentin thru 5/26  - UCx with E faecalis, ampi sens, dc meropenem and started on augmentin, augmentin completed on 5/26, BCx neg to date, per ID suspect this colonization rather than true infection.   -of note patient with known atrophic left kidney (dx'ed in 40s per pt) - monitor renal function/trend Cr  patient with known large bladder calculus- needs to f/u with urology as outpt   with reported recurrent history of Proteus infxn  R perinephric fat stranding noted on imaging
-+UA for Nitrates, LE, and bacteria pt has chronic hernandez, asymptomatic, without fevers and afebrile  - S/P zosyn, dc ertapenem, ID consult appreciated, per ID started on meropenem now dc and on augmentin thru 5/26  - UCx with E faecalis, ampi sens, dc meropenem and started on augmentin, augmentin completed on 5/26, BCx neg to date, per ID suspect this colonization rather than true infection.   - of note patient with known atrophic left kidney (dx'ed in 40s per pt) - monitor renal function/trend Cr  patient with known large bladder calculus- needs to f/u with urology as outpt   with reported recurrent history of Proteus infxn
-+UA for Nitrates, LE, and bacteria pt has chronic hernandez, asymptomatic, without fevers and afebrile  -S/P zosyn, dc ertapenem, ID consult appreciated, per ID started on meropenem now dc and on augmentin thru 5/26  - UCx with E faecalis, ampi sens, dc meropenem and started on augmentin, augmentin completed on 5/26, BCx neg to date, per ID suspect this colonization rather than true infection.   -of note patient with known atrophic left kidney (dx'ed in 40s per pt) - monitor renal function/trend Cr  patient with known large bladder calculus- needs to f/u with urology as outpt   with reported recurrent history of Proteus infxn
-+UA for Nitrates, LE, and bacteria pt has chronic hernandez, asymptomatic, without fevers and afebrile  -S/P zosyn, dc ertapenem, ID consult appreciated, per ID started on meropenem now dc and on augmentin thru 5/26  - UCx with E faecalis, ampi sens, dc meropenem and started on augmentin, augmentin completed on 5/26, BCx neg to date, per ID suspect this colonization rather than true infection.   -of note patient with known atrophic left kidney (dx'ed in 40s per pt) - monitor renal function/trend Cr  patient with known large bladder calculus- needs to f/u with urology as outpt   with reported recurrent history of Proteus infxn  R perinephric fat stranding noted on imaging
-+UA for Nitrates, LE, and bacteria pt has chronic hernandez, asymptomatic, without fevers and afebrile  -S/P zosyn, dc ertapenem, ID consult appreciated, per ID started on meropenem, likely to give a short course  -f/u UCx gpc in pairs and chains, BCx neg to date  -of note patient with known atrophic left kidney (dx'ed in 40s per pt) - monitor renal function/trend Cr  patient with known large bladder calculus- needs to f/u with urology as outpt   with reported recurrent history of Proteus infxn  R perinephric fat stranding noted on imaging  denies fevers/chills
-+UA for Nitrates, LE, and bacteria pt has chronic hernandez, asymptomatic, without fevers and afebrile  -S/P zosyn, dc ertapenem, ID consult appreciated, per ID started on meropenem now dc  -f/u UCx with E faecalis, ampi sens, dc meropenem and started on augmentin, BCx neg to date, per ID suspect this colonization rather than true infection.   -of note patient with known atrophic left kidney (dx'ed in 40s per pt) - monitor renal function/trend Cr  patient with known large bladder calculus- needs to f/u with urology as outpt   with reported recurrent history of Proteus infxn  R perinephric fat stranding noted on imaging  denies fevers/chills

## 2024-06-04 NOTE — PROGRESS NOTE ADULT - ASSESSMENT
74 yo M PMHx DM, spinal stenosis complicated by neurogenic bladder with chronic Salazar presenting for lower extremity weakness. now post ACDF

## 2024-06-04 NOTE — PROGRESS NOTE ADULT - PROBLEM SELECTOR PROBLEM 2
Urine culture positive

## 2024-06-04 NOTE — PROGRESS NOTE ADULT - ASSESSMENT
74yo M PMHx spinal stenosis complicated by ??neurogenic bladder?? diagnosed 3 years ago in [place], pt says he had bladder reflux with chronic hernandez, had imaging at that time and refused surgical intervention. He has never followed in [facility] w spine surgeon. He presented with progressive weakness and pain in lower extremities since 5/15. CT cervical spine reveals high-grade bilateral C2-C3, C3-C4, C5-C6, and C6-C7 stenosis.    5/29: OR for C4-C7 ACDF  5/30: s/p C4-7 ACDF, hmv drain, pod #1, possible nonluminal esophageal injury closed with stitch, npo, ENT following, cxray done to r/o pneumomediastinum  5/31: Stable exam POD # 2 S/P  C4-7 ACDF, HMV X 1. CXR showed no pneumomediastinum. Initial evaluation done by PM&R  6/1-3: Stable exam. HMV removed. C-spine X-ray stable. awaiting acute rehab  6/4: clear for outpt PT, pending discharge home

## 2024-06-04 NOTE — PROGRESS NOTE ADULT - REASON FOR ADMISSION
BL LE weakness

## 2024-06-04 NOTE — PROGRESS NOTE ADULT - SUBJECTIVE AND OBJECTIVE BOX
[facility] Division of Hospital Medicine  Roland Harmon MD  Pager (C-F, 0F-[phone]): [phone]  Other Times:  [phone]    Patient is a 74y old  Male who presents with a chief complaint of BL LE weakness (04 Jun 2024 00:00)      SUBJECTIVE / OVERNIGHT EVENTS: Pt in NAD.     MEDICATIONS  (STANDING):  artificial  tears Solution 1 Drop(s) Both EYES three times a day  bisacodyl 5 milliGRAM(s) Oral at bedtime  chlorhexidine 2% Cloths 1 Application(s) Topical daily  dextrose 10% Bolus 125 milliLiter(s) IV Bolus once  dextrose 5%. 1000 milliLiter(s) (100 mL/Hr) IV Continuous <Continuous>  dextrose 5%. 1000 milliLiter(s) (50 mL/Hr) IV Continuous <Continuous>  dextrose 50% Injectable 25 Gram(s) IV Push once  dextrose 50% Injectable 12.5 Gram(s) IV Push once  enalapril 20 milliGRAM(s) Oral two times a day  enalaprilat Injectable 2.5 milliGRAM(s) IV Push once  enoxaparin Injectable 40 milliGRAM(s) SubCutaneous every 24 hours  glucagon  Injectable 1 milliGRAM(s) IntraMuscular once  insulin glargine Injectable (LANTUS) 40 Unit(s) SubCutaneous at bedtime  insulin lispro (ADMELOG) corrective regimen sliding scale   SubCutaneous Before meals and at bedtime  insulin lispro Injectable (ADMELOG) 16 Unit(s) SubCutaneous three times a day before meals  lidocaine   4% Patch 1 Patch Transdermal every 24 hours  metoprolol succinate ER 50 milliGRAM(s) Oral daily  pantoprazole    Tablet 40 milliGRAM(s) Oral before breakfast  phenazopyridine 100 milliGRAM(s) Oral every 8 hours    MEDICATIONS  (PRN):  acetaminophen   Oral Liquid .. 650 milliGRAM(s) Oral every 6 hours PRN Temp greater or equal to 38C (100.4F), Mild Pain (1 - 3)  benzocaine/menthol Lozenge 1 Lozenge Oral every 2 hours PRN Sore Throat  cyclobenzaprine 5 milliGRAM(s) Oral three times a day PRN Muscle Spasm  dextrose Oral Gel 15 Gram(s) Oral once PRN Blood Glucose LESS THAN 70 milliGRAM(s)/deciliter  melatonin 3 milliGRAM(s) Oral at bedtime PRN Insomnia  traMADol 50 milliGRAM(s) Oral every 6 hours PRN Moderate Pain (4 - 6)      CAPILLARY BLOOD GLUCOSE      POCT Blood Glucose.: 123 mg/dL (04 Jun 2024 11:26)  POCT Blood Glucose.: 159 mg/dL (04 Jun 2024 07:12)  POCT Blood Glucose.: 186 mg/dL (03 Jun 2024 22:15)  POCT Blood Glucose.: 121 mg/dL (03 Jun 2024 16:26)    I&O's Summary    03 Jun 2024 07:01  -  04 Jun 2024 07:00  --------------------------------------------------------  IN: 500 mL / OUT: 1125 mL / NET: -625 mL    04 Jun 2024 07:01  -  04 Jun 2024 12:47  --------------------------------------------------------  IN: 300 mL / OUT: 600 mL / NET: -300 mL        PHYSICAL EXAM:  Vital Signs Last 24 Hrs  T(C): 36.6 (04 Jun 2024 10:00), Max: 36.8 (03 Jun 2024 17:11)  T(F): 97.9 (04 Jun 2024 10:00), Max: 98.2 (03 Jun 2024 17:11)  HR: 76 (04 Jun 2024 10:00) (74 - 90)  BP: 139/70 (04 Jun 2024 10:00) (132/61 - 158/74)  BP(mean): --  RR: 17 (04 Jun 2024 10:00) (16 - 18)  SpO2: 97% (04 Jun 2024 10:00) (96% - 99%)    Parameters below as of 04 Jun 2024 10:00  Patient On (Oxygen Delivery Method): room air    CONSTITUTIONAL: NAD  NECK: steri strips in place   RESPIRATORY: Normal respiratory effort; lungs are clear to auscultation bilaterally  CARDIOVASCULAR: Regular rate and rhythm, normal S1 and S2, no murmur/rub/gallop;   ABDOMEN: Nontender to palpation, normoactive bowel sounds, no rebound/guarding;   PSYCH: AA answers questions appropriately      LABS:                        14.3   8.47  )-----------( 268      ( 03 Jun 2024 06:45 )             41.4     06-03    135  |  99  |  27<H>  ----------------------------<  151<H>  4.2   |  24  |  1.04    Ca    10.2      03 Jun 2024 06:45            Urinalysis Basic - ( 03 Jun 2024 06:45 )    Color: x / Appearance: x / SG: x / pH: x  Gluc: 151 mg/dL / Ketone: x  / Bili: x / Urobili: x   Blood: x / Protein: x / Nitrite: x   Leuk Esterase: x / RBC: x / WBC x   Sq Epi: x / Non Sq Epi: x / Bacteria: x          RADIOLOGY & ADDITIONAL TESTS:  Results Reviewed:   Imaging Personally Reviewed:  Electrocardiogram Personally Reviewed:    COORDINATION OF CARE:  Care Discussed with Consultants/Other Providers [Y/N]:  Prior or Outpatient Records Reviewed [Y/N]:

## 2024-06-04 NOTE — PROGRESS NOTE ADULT - PROBLEM SELECTOR PROBLEM 5
CAD (coronary artery disease)

## 2024-06-04 NOTE — PROGRESS NOTE ADULT - PROBLEM SELECTOR PROBLEM 3
Type 2 diabetes mellitus, with long-term current use of insulin

## 2024-06-04 NOTE — PROGRESS NOTE ADULT - PROVIDER SPECIALTY LIST ADULT
Anesthesia
Infectious Disease
Infectious Disease
Neurosurgery
Neurosurgery
Cardiology
Hospitalist
Neurosurgery
Rehab Medicine
Rehab Medicine
Cardiology
Hospitalist
Hospitalist
Neurosurgery
Cardiology
Cardiology
Neurosurgery
Cardiology
Hospitalist

## 2024-06-04 NOTE — PROGRESS NOTE ADULT - SUBJECTIVE AND OBJECTIVE BOX
True oRthman MD  Interventional Cardiology / Endovascular Specialist  [place] Office : [address] [place] [address] [place] [address]  Tel:   [place] Office : [address] [facility] [place] [address]  Tel: [phone]    Pt is lying in bed comfortable not in distress, no chest pains no SOB no palpitations, s/p cervical spine surgery doing well  	  MEDICATIONS:  enalapril 20 milliGRAM(s) Oral two times a day  enalaprilat Injectable 2.5 milliGRAM(s) IV Push once  enoxaparin Injectable 40 milliGRAM(s) SubCutaneous every 24 hours  metoprolol succinate ER 50 milliGRAM(s) Oral daily        acetaminophen   Oral Liquid .. 650 milliGRAM(s) Oral every 6 hours PRN  cyclobenzaprine 5 milliGRAM(s) Oral three times a day PRN  melatonin 3 milliGRAM(s) Oral at bedtime PRN  traMADol 50 milliGRAM(s) Oral every 6 hours PRN    bisacodyl 5 milliGRAM(s) Oral at bedtime  pantoprazole    Tablet 40 milliGRAM(s) Oral before breakfast    dextrose 50% Injectable 25 Gram(s) IV Push once  dextrose 50% Injectable 12.5 Gram(s) IV Push once  dextrose Oral Gel 15 Gram(s) Oral once PRN  glucagon  Injectable 1 milliGRAM(s) IntraMuscular once  insulin glargine Injectable (LANTUS) 40 Unit(s) SubCutaneous at bedtime  insulin lispro (ADMELOG) corrective regimen sliding scale   SubCutaneous Before meals and at bedtime  insulin lispro Injectable (ADMELOG) 16 Unit(s) SubCutaneous three times a day before meals    artificial  tears Solution 1 Drop(s) Both EYES three times a day  benzocaine/menthol Lozenge 1 Lozenge Oral every 2 hours PRN  chlorhexidine 2% Cloths 1 Application(s) Topical daily  dextrose 10% Bolus 125 milliLiter(s) IV Bolus once  dextrose 5%. 1000 milliLiter(s) IV Continuous <Continuous>  dextrose 5%. 1000 milliLiter(s) IV Continuous <Continuous>  lidocaine   4% Patch 1 Patch Transdermal every 24 hours      PAST MEDICAL/SURGICAL HISTORY  PAST MEDICAL & SURGICAL HISTORY:  Diabetes      Neurogenic bladder      Chronic indwelling Salazar catheter      Hypertension      CAD (coronary artery disease)      History of appendectomy      S/P CABG (coronary artery bypass graft)          SOCIAL HISTORY: Substance Use (street drugs): ( x ) never used  (  ) other:    FAMILY HISTORY:  Family hx of lung cancer (Father)      PHYSICAL EXAM:  T(C): 36.6 (06-04-24 @ 10:00), Max: 36.8 (06-03-24 @ 17:11)  HR: 76 (06-04-24 @ 10:00) (76 - 90)  BP: 139/70 (06-04-24 @ 10:00) (132/61 - 158/74)  RR: 17 (06-04-24 @ 10:00) (16 - 18)  SpO2: 97% (06-04-24 @ 10:00) (96% - 98%)  Wt(kg): --  I&O's Summary    03 Jun 2024 07:01  -  04 Jun 2024 07:00  --------------------------------------------------------  IN: 500 mL / OUT: 1125 mL / NET: -625 mL    04 Jun 2024 07:01  -  04 Jun 2024 16:05  --------------------------------------------------------  IN: 300 mL / OUT: 600 mL / NET: -300 mL        GENERAL: NAD  EYES:   PERRLA   ENMT:   Moist mucous membranes, Good dentition, No lesions  Cardiovascular: Normal S1 S2, No JVD, No murmurs, No edema  Respiratory: Lungs clear to auscultation	  Gastrointestinal:  Soft, Non-tender, + BS	  Extremities: no edema                                14.3   8.47  )-----------( 268      ( 03 Jun 2024 06:45 )             41.4     06-03    135  |  99  |  27<H>  ----------------------------<  151<H>  4.2   |  24  |  1.04    Ca    10.2      03 Jun 2024 06:45      proBNP:   Lipid Profile:   HgA1c:   TSH:     Consultant(s) Notes Reviewed:  [x ] YES  [ ] NO    Care Discussed with Consultants/Other Providers [ x] YES  [ ] NO    Imaging Personally Reviewed independently:  [x] YES  [ ] NO    All labs, radiologic studies, vitals, orders and medications list reviewed. Patient is seen and examined at bedside. Case discussed with medical team.

## 2024-06-04 NOTE — PROGRESS NOTE ADULT - PROBLEM SELECTOR PLAN 3
-At home on 40lantus and 16 novolog TID  -increased lantus to 34 units and premeal to 13 units TID for better glycemic control, adjust prn  -SSI
-At home on 40lantus and 16 novolog TID  -increased lantus to 34 units and premeal to 13 units TID for better glycemic control, adjust prn  -SSI  will adjust insulin for NPO status tonight
-At home on 40 lantus and 16 novolog TID  -inc lantus back to home dose as FS remain in 200s   -SSI
-At home on 40 lantus and 16 novolog TID  -inc lantus back to home dose as FS remain in 200s   -SSI
-At home on 40lantus and 16 novolog TID  -increased lantus to 34 units and premeal to 13 units TID for better glycemic control, adjust prn  -SSI
-At home on 40lantus and 16 novolog TID  -increased lantus to 34 units and premeal to 13 units TID for better glycemic control, adjust prn  -SSI
-At home on 40lantus and 16 novolog TID  -on 28 units lantus and 10U premeal TID  -SSI
-At home on 40 lantus and 16 novolog TID  -inc lantus back to home dose as FS remain in 200s   -SSI
-At home on 40lantus and 16 novolog TID  -cont lantus 32 units and premeal 12 units TID adjust prn  -SSI
-At home on 40lantus and 16 novolog TID  -resume lantus 34 units tonight and premeal to 13 units TID for better glycemic control, adjust prn  -SSI
-At home on 40lantus and 16 novolog TID  -inc lantus from 34 to 38 units tonight and premeal to 15 units TID for better glycemic control, adjust prn  -SSI
-At home on 40lantus and 16 novolog TID  -on 28 units lantus and 10U premeal TID  -SSI
- At home on 40 lantus and 16 novolog TID  - SSI
-At home on 40lantus and 16 novolog TID  -increased lantus to 32 units and premeal to 12 units TID for better glycemic control  -SSI
- At home on 40 lantus and 16 novolog TID  - SSI
-At home on 40lantus and 16 novolog TID  -increased lantus to 34 units and premeal to 13 units TID for better glycemic control, adjust prn  -SSI

## (undated) DEVICE — GLV 8 PROTEXIS (WHITE)

## (undated) DEVICE — PROTECTOR HEEL / ELBOW FLUFFY

## (undated) DEVICE — DRAPE 3/4 SHEET 52X76"

## (undated) DEVICE — DRAIN JACKSON PRATT 7MM FLAT FULL NO TROCAR

## (undated) DEVICE — DRAIN RESERVOIR FOR JACKSON PRATT 100CC CARDINAL

## (undated) DEVICE — SUT VICRYL 3-0 18" SH UNDYED (POP-OFF)

## (undated) DEVICE — DRAPE TOWEL BLUE 17" X 24"

## (undated) DEVICE — SUCTION YANKAUER NO CONTROL VENT

## (undated) DEVICE — Device

## (undated) DEVICE — DRAPE MICROSCOPE ZEISS OPMI VISIONGUARD 154 X 52"

## (undated) DEVICE — TUBING FOR SMOKE EVACUATOR (PURPLE END)

## (undated) DEVICE — WARMING BLANKET LOWER ADULT

## (undated) DEVICE — CANISTER DISPOSABLE THIN WALL 3000CC

## (undated) DEVICE — DRSG MASTISOL

## (undated) DEVICE — ELCTR GROUNDING PAD ADULT COVIDIEN

## (undated) DEVICE — PACK NEURO MINOR

## (undated) DEVICE — DRSG CURITY GAUZE SPONGE 4 X 4" 12-PLY

## (undated) DEVICE — SUT SILK 2-0 30" TIES

## (undated) DEVICE — DRSG BENZOIN 0.6CC

## (undated) DEVICE — GLV 7.5 PROTEXIS (WHITE)

## (undated) DEVICE — DRSG TELFA 3 X 8

## (undated) DEVICE — DRSG THROMBI DISC HEMOSTAT

## (undated) DEVICE — STAPLER SKIN VISI-STAT 35 WIDE

## (undated) DEVICE — NDL SPINAL 18G X 3.5" (PINK)

## (undated) DEVICE — TAPE SILK 3"

## (undated) DEVICE — FOLEY TRAY 14FR 5CC LF UMETER CLOSED

## (undated) DEVICE — DRAPE COVER SNAP 36X30"

## (undated) DEVICE — POSITIONER FOAM EGG CRATE ULNAR 2PCS (PINK)

## (undated) DEVICE — DRAPE MAGNETIC INSTRUMENT MEDIUM

## (undated) DEVICE — DRAPE MINOR PROCEDURE

## (undated) DEVICE — BIPOLAR FORCEP STRYKER STANDARD 8" X 1MM (YELLOW)

## (undated) DEVICE — PREP DURAPREP 26CC

## (undated) DEVICE — SUT SILK 2-0 18" TIES

## (undated) DEVICE — SUT MONOCRYL 4-0 27" PS-2 UNDYED

## (undated) DEVICE — SOL IRR POUR H2O 500ML

## (undated) DEVICE — VENODYNE/SCD SLEEVE CALF MEDIUM

## (undated) DEVICE — ELCTR BOVIE TIP BLADE INSULATED 2.75" EDGE WITH SAFETY

## (undated) DEVICE — DRAPE BACK TABLE COVER 44X90"

## (undated) DEVICE — MIDAS REX LEGEND MATCH HEAD FLUTED LG BORE 3.0MM X 14CM

## (undated) DEVICE — GOWN XXXL

## (undated) DEVICE — LIJ-KMEDIC KERRISON RONGUER: Type: DURABLE MEDICAL EQUIPMENT

## (undated) DEVICE — SOL IRR BAG NS 0.9% 1000ML

## (undated) DEVICE — LABELS BLANK W PEN

## (undated) DEVICE — SPONGE PEANUT AUTO COUNT

## (undated) DEVICE — ELCTR BOVIE PENCIL BLADE 10FT

## (undated) DEVICE — SOL IRR POUR NS 0.9% 1500ML